# Patient Record
Sex: FEMALE | Race: WHITE | NOT HISPANIC OR LATINO
[De-identification: names, ages, dates, MRNs, and addresses within clinical notes are randomized per-mention and may not be internally consistent; named-entity substitution may affect disease eponyms.]

---

## 2018-01-31 ENCOUNTER — RESULT REVIEW (OUTPATIENT)
Age: 63
End: 2018-01-31

## 2018-05-04 ENCOUNTER — INPATIENT (INPATIENT)
Facility: HOSPITAL | Age: 63
LOS: 1 days | Discharge: HOME | DRG: 872 | End: 2018-05-06
Attending: STUDENT IN AN ORGANIZED HEALTH CARE EDUCATION/TRAINING PROGRAM | Admitting: STUDENT IN AN ORGANIZED HEALTH CARE EDUCATION/TRAINING PROGRAM
Payer: COMMERCIAL

## 2018-05-04 VITALS
SYSTOLIC BLOOD PRESSURE: 113 MMHG | TEMPERATURE: 99 F | DIASTOLIC BLOOD PRESSURE: 74 MMHG | WEIGHT: 136.03 LBS | HEART RATE: 104 BPM | OXYGEN SATURATION: 95 % | RESPIRATION RATE: 16 BRPM | HEIGHT: 63 IN

## 2018-05-04 DIAGNOSIS — N39.0 URINARY TRACT INFECTION, SITE NOT SPECIFIED: ICD-10-CM

## 2018-05-04 DIAGNOSIS — Z29.9 ENCOUNTER FOR PROPHYLACTIC MEASURES, UNSPECIFIED: ICD-10-CM

## 2018-05-04 DIAGNOSIS — J44.9 CHRONIC OBSTRUCTIVE PULMONARY DISEASE, UNSPECIFIED: ICD-10-CM

## 2018-05-04 DIAGNOSIS — A41.9 SEPSIS, UNSPECIFIED ORGANISM: ICD-10-CM

## 2018-05-04 DIAGNOSIS — N13.9 OBSTRUCTIVE AND REFLUX UROPATHY, UNSPECIFIED: ICD-10-CM

## 2018-05-04 DIAGNOSIS — Z98.89 OTHER SPECIFIED POSTPROCEDURAL STATES: Chronic | ICD-10-CM

## 2018-05-04 DIAGNOSIS — Z87.891 PERSONAL HISTORY OF NICOTINE DEPENDENCE: ICD-10-CM

## 2018-05-04 DIAGNOSIS — N10 ACUTE PYELONEPHRITIS: ICD-10-CM

## 2018-05-04 LAB
ALBUMIN SERPL ELPH-MCNC: 3.7 G/DL — SIGNIFICANT CHANGE UP (ref 3.3–5)
ALP SERPL-CCNC: 92 U/L — SIGNIFICANT CHANGE UP (ref 40–120)
ALT FLD-CCNC: 57 U/L — SIGNIFICANT CHANGE UP (ref 12–78)
ANION GAP SERPL CALC-SCNC: 7 MMOL/L — SIGNIFICANT CHANGE UP (ref 5–17)
ANION GAP SERPL CALC-SCNC: 7 MMOL/L — SIGNIFICANT CHANGE UP (ref 5–17)
APPEARANCE UR: CLEAR — SIGNIFICANT CHANGE UP
APTT BLD: 29.3 SEC — SIGNIFICANT CHANGE UP (ref 27.5–37.4)
AST SERPL-CCNC: 29 U/L — SIGNIFICANT CHANGE UP (ref 15–37)
BACTERIA # UR AUTO: ABNORMAL
BASOPHILS # BLD AUTO: 0.2 K/UL — SIGNIFICANT CHANGE UP (ref 0–0.2)
BASOPHILS NFR BLD AUTO: 1.3 % — SIGNIFICANT CHANGE UP (ref 0–2)
BILIRUB SERPL-MCNC: 0.9 MG/DL — SIGNIFICANT CHANGE UP (ref 0.2–1.2)
BILIRUB UR-MCNC: ABNORMAL
BUN SERPL-MCNC: 21 MG/DL — SIGNIFICANT CHANGE UP (ref 7–23)
BUN SERPL-MCNC: 21 MG/DL — SIGNIFICANT CHANGE UP (ref 7–23)
CALCIUM SERPL-MCNC: 9.2 MG/DL — SIGNIFICANT CHANGE UP (ref 8.5–10.1)
CALCIUM SERPL-MCNC: 9.2 MG/DL — SIGNIFICANT CHANGE UP (ref 8.5–10.1)
CHLORIDE SERPL-SCNC: 103 MMOL/L — SIGNIFICANT CHANGE UP (ref 96–108)
CHLORIDE SERPL-SCNC: 103 MMOL/L — SIGNIFICANT CHANGE UP (ref 96–108)
CO2 SERPL-SCNC: 29 MMOL/L — SIGNIFICANT CHANGE UP (ref 22–31)
CO2 SERPL-SCNC: 29 MMOL/L — SIGNIFICANT CHANGE UP (ref 22–31)
COLOR SPEC: YELLOW — SIGNIFICANT CHANGE UP
CREAT SERPL-MCNC: 0.78 MG/DL — SIGNIFICANT CHANGE UP (ref 0.5–1.3)
CREAT SERPL-MCNC: 0.78 MG/DL — SIGNIFICANT CHANGE UP (ref 0.5–1.3)
DIFF PNL FLD: ABNORMAL
EOSINOPHIL # BLD AUTO: 0 K/UL — SIGNIFICANT CHANGE UP (ref 0–0.5)
EOSINOPHIL NFR BLD AUTO: 0 % — SIGNIFICANT CHANGE UP (ref 0–6)
EPI CELLS # UR: SIGNIFICANT CHANGE UP
GLUCOSE SERPL-MCNC: 119 MG/DL — HIGH (ref 70–99)
GLUCOSE SERPL-MCNC: 119 MG/DL — HIGH (ref 70–99)
GLUCOSE UR QL: NEGATIVE — SIGNIFICANT CHANGE UP
HCT VFR BLD CALC: 45.2 % — HIGH (ref 34.5–45)
HGB BLD-MCNC: 15.6 G/DL — HIGH (ref 11.5–15.5)
HYALINE CASTS # UR AUTO: ABNORMAL /LPF
INR BLD: 1.12 RATIO — SIGNIFICANT CHANGE UP (ref 0.88–1.16)
KETONES UR-MCNC: ABNORMAL
LACTATE SERPL-SCNC: 1.1 MMOL/L — SIGNIFICANT CHANGE UP (ref 0.7–2)
LEUKOCYTE ESTERASE UR-ACNC: ABNORMAL
LYMPHOCYTES # BLD AUTO: 1 K/UL — SIGNIFICANT CHANGE UP (ref 1–3.3)
LYMPHOCYTES # BLD AUTO: 5.7 % — LOW (ref 13–44)
MCHC RBC-ENTMCNC: 30.2 PG — SIGNIFICANT CHANGE UP (ref 27–34)
MCHC RBC-ENTMCNC: 34.5 GM/DL — SIGNIFICANT CHANGE UP (ref 32–36)
MCV RBC AUTO: 87.5 FL — SIGNIFICANT CHANGE UP (ref 80–100)
MONOCYTES # BLD AUTO: 1.1 K/UL — HIGH (ref 0–0.9)
MONOCYTES NFR BLD AUTO: 6.3 % — SIGNIFICANT CHANGE UP (ref 1–9)
NEUTROPHILS # BLD AUTO: 14.8 K/UL — HIGH (ref 1.8–7.4)
NEUTROPHILS NFR BLD AUTO: 86.6 % — HIGH (ref 43–77)
NITRITE UR-MCNC: NEGATIVE — SIGNIFICANT CHANGE UP
PH UR: 5 — SIGNIFICANT CHANGE UP (ref 5–8)
PLATELET # BLD AUTO: 235 K/UL — SIGNIFICANT CHANGE UP (ref 150–400)
POTASSIUM SERPL-MCNC: 3.8 MMOL/L — SIGNIFICANT CHANGE UP (ref 3.5–5.3)
POTASSIUM SERPL-MCNC: 3.8 MMOL/L — SIGNIFICANT CHANGE UP (ref 3.5–5.3)
POTASSIUM SERPL-SCNC: 3.8 MMOL/L — SIGNIFICANT CHANGE UP (ref 3.5–5.3)
POTASSIUM SERPL-SCNC: 3.8 MMOL/L — SIGNIFICANT CHANGE UP (ref 3.5–5.3)
PROT SERPL-MCNC: 7.8 G/DL — SIGNIFICANT CHANGE UP (ref 6–8.3)
PROT UR-MCNC: NEGATIVE — SIGNIFICANT CHANGE UP
PROTHROM AB SERPL-ACNC: 12.2 SEC — SIGNIFICANT CHANGE UP (ref 9.8–12.7)
RBC # BLD: 5.17 M/UL — SIGNIFICANT CHANGE UP (ref 3.8–5.2)
RBC # FLD: 11.2 % — SIGNIFICANT CHANGE UP (ref 10.3–14.5)
RBC CASTS # UR COMP ASSIST: ABNORMAL /HPF (ref 0–4)
SODIUM SERPL-SCNC: 139 MMOL/L — SIGNIFICANT CHANGE UP (ref 135–145)
SODIUM SERPL-SCNC: 139 MMOL/L — SIGNIFICANT CHANGE UP (ref 135–145)
SP GR SPEC: 1.02 — SIGNIFICANT CHANGE UP (ref 1.01–1.02)
UROBILINOGEN FLD QL: 1
WBC # BLD: 17.1 K/UL — HIGH (ref 3.8–10.5)
WBC # FLD AUTO: 17.1 K/UL — HIGH (ref 3.8–10.5)
WBC UR QL: SIGNIFICANT CHANGE UP

## 2018-05-04 PROCEDURE — 74176 CT ABD & PELVIS W/O CONTRAST: CPT | Mod: 26

## 2018-05-04 PROCEDURE — 99223 1ST HOSP IP/OBS HIGH 75: CPT | Mod: AI

## 2018-05-04 PROCEDURE — 99285 EMERGENCY DEPT VISIT HI MDM: CPT

## 2018-05-04 RX ORDER — TAMSULOSIN HYDROCHLORIDE 0.4 MG/1
0.4 CAPSULE ORAL ONCE
Qty: 0 | Refills: 0 | Status: COMPLETED | OUTPATIENT
Start: 2018-05-04 | End: 2018-05-04

## 2018-05-04 RX ORDER — CEFTRIAXONE 500 MG/1
1 INJECTION, POWDER, FOR SOLUTION INTRAMUSCULAR; INTRAVENOUS EVERY 24 HOURS
Qty: 0 | Refills: 0 | Status: DISCONTINUED | OUTPATIENT
Start: 2018-05-05 | End: 2018-05-06

## 2018-05-04 RX ORDER — VALACYCLOVIR 500 MG/1
1 TABLET, FILM COATED ORAL
Qty: 0 | Refills: 0 | COMMUNITY

## 2018-05-04 RX ORDER — TRAMADOL HYDROCHLORIDE 50 MG/1
1 TABLET ORAL
Qty: 0 | Refills: 0 | COMMUNITY

## 2018-05-04 RX ORDER — CEFTRIAXONE 500 MG/1
INJECTION, POWDER, FOR SOLUTION INTRAMUSCULAR; INTRAVENOUS
Qty: 0 | Refills: 0 | Status: DISCONTINUED | OUTPATIENT
Start: 2018-05-04 | End: 2018-05-06

## 2018-05-04 RX ORDER — AZTREONAM 2 G
2000 VIAL (EA) INJECTION EVERY 8 HOURS
Qty: 0 | Refills: 0 | Status: DISCONTINUED | OUTPATIENT
Start: 2018-05-04 | End: 2018-05-04

## 2018-05-04 RX ORDER — AZTREONAM 2 G
1000 VIAL (EA) INJECTION ONCE
Qty: 0 | Refills: 0 | Status: COMPLETED | OUTPATIENT
Start: 2018-05-04 | End: 2018-05-04

## 2018-05-04 RX ORDER — ONDANSETRON 8 MG/1
4 TABLET, FILM COATED ORAL EVERY 4 HOURS
Qty: 0 | Refills: 0 | Status: DISCONTINUED | OUTPATIENT
Start: 2018-05-04 | End: 2018-05-06

## 2018-05-04 RX ORDER — CEFTRIAXONE 500 MG/1
1 INJECTION, POWDER, FOR SOLUTION INTRAMUSCULAR; INTRAVENOUS ONCE
Qty: 0 | Refills: 0 | Status: COMPLETED | OUTPATIENT
Start: 2018-05-04 | End: 2018-05-04

## 2018-05-04 RX ORDER — SODIUM CHLORIDE 9 MG/ML
1000 INJECTION INTRAMUSCULAR; INTRAVENOUS; SUBCUTANEOUS
Qty: 0 | Refills: 0 | Status: COMPLETED | OUTPATIENT
Start: 2018-05-04 | End: 2018-05-04

## 2018-05-04 RX ORDER — TAMSULOSIN HYDROCHLORIDE 0.4 MG/1
0.4 CAPSULE ORAL AT BEDTIME
Qty: 0 | Refills: 0 | Status: DISCONTINUED | OUTPATIENT
Start: 2018-05-04 | End: 2018-05-06

## 2018-05-04 RX ORDER — KETOROLAC TROMETHAMINE 30 MG/ML
15 SYRINGE (ML) INJECTION EVERY 8 HOURS
Qty: 0 | Refills: 0 | Status: DISCONTINUED | OUTPATIENT
Start: 2018-05-04 | End: 2018-05-06

## 2018-05-04 RX ORDER — ACETAMINOPHEN 500 MG
650 TABLET ORAL EVERY 6 HOURS
Qty: 0 | Refills: 0 | Status: DISCONTINUED | OUTPATIENT
Start: 2018-05-04 | End: 2018-05-06

## 2018-05-04 RX ORDER — MORPHINE SULFATE 50 MG/1
2 CAPSULE, EXTENDED RELEASE ORAL EVERY 4 HOURS
Qty: 0 | Refills: 0 | Status: DISCONTINUED | OUTPATIENT
Start: 2018-05-04 | End: 2018-05-06

## 2018-05-04 RX ORDER — SODIUM CHLORIDE 9 MG/ML
1000 INJECTION INTRAMUSCULAR; INTRAVENOUS; SUBCUTANEOUS
Qty: 0 | Refills: 0 | Status: DISCONTINUED | OUTPATIENT
Start: 2018-05-04 | End: 2018-05-06

## 2018-05-04 RX ORDER — FAMOTIDINE 10 MG/ML
20 INJECTION INTRAVENOUS
Qty: 0 | Refills: 0 | Status: DISCONTINUED | OUTPATIENT
Start: 2018-05-04 | End: 2018-05-06

## 2018-05-04 RX ORDER — MORPHINE SULFATE 50 MG/1
4 CAPSULE, EXTENDED RELEASE ORAL ONCE
Qty: 0 | Refills: 0 | Status: DISCONTINUED | OUTPATIENT
Start: 2018-05-04 | End: 2018-05-04

## 2018-05-04 RX ORDER — ONDANSETRON 8 MG/1
4 TABLET, FILM COATED ORAL ONCE
Qty: 0 | Refills: 0 | Status: COMPLETED | OUTPATIENT
Start: 2018-05-04 | End: 2018-05-04

## 2018-05-04 RX ADMIN — SODIUM CHLORIDE 125 MILLILITER(S): 9 INJECTION INTRAMUSCULAR; INTRAVENOUS; SUBCUTANEOUS at 21:33

## 2018-05-04 RX ADMIN — TAMSULOSIN HYDROCHLORIDE 0.4 MILLIGRAM(S): 0.4 CAPSULE ORAL at 16:29

## 2018-05-04 RX ADMIN — TAMSULOSIN HYDROCHLORIDE 0.4 MILLIGRAM(S): 0.4 CAPSULE ORAL at 21:34

## 2018-05-04 RX ADMIN — Medication 15 MILLIGRAM(S): at 19:42

## 2018-05-04 RX ADMIN — Medication 15 MILLIGRAM(S): at 20:08

## 2018-05-04 RX ADMIN — CEFTRIAXONE 100 GRAM(S): 500 INJECTION, POWDER, FOR SOLUTION INTRAMUSCULAR; INTRAVENOUS at 19:36

## 2018-05-04 RX ADMIN — MORPHINE SULFATE 4 MILLIGRAM(S): 50 CAPSULE, EXTENDED RELEASE ORAL at 15:03

## 2018-05-04 RX ADMIN — Medication 50 MILLIGRAM(S): at 16:27

## 2018-05-04 RX ADMIN — SODIUM CHLORIDE 1000 MILLILITER(S): 9 INJECTION INTRAMUSCULAR; INTRAVENOUS; SUBCUTANEOUS at 15:08

## 2018-05-04 RX ADMIN — SODIUM CHLORIDE 1000 MILLILITER(S): 9 INJECTION INTRAMUSCULAR; INTRAVENOUS; SUBCUTANEOUS at 19:16

## 2018-05-04 RX ADMIN — MORPHINE SULFATE 4 MILLIGRAM(S): 50 CAPSULE, EXTENDED RELEASE ORAL at 19:02

## 2018-05-04 RX ADMIN — SODIUM CHLORIDE 1000 MILLILITER(S): 9 INJECTION INTRAMUSCULAR; INTRAVENOUS; SUBCUTANEOUS at 16:21

## 2018-05-04 RX ADMIN — ONDANSETRON 4 MILLIGRAM(S): 8 TABLET, FILM COATED ORAL at 15:05

## 2018-05-04 NOTE — ED PROVIDER NOTE - OBJECTIVE STATEMENT
pt is a 64 yo f who has known hx of intrarenal stones, recently finished treatment for shingles copd former smoker sp gb and hernia pmd dr BOOGIE Cuevas allergic to pcn and latex  she began to have pain in her back 3 days ago saw pmd, who ordered xrays found djd so she was prescribed pain meds. she has persistent severe pain underwent us today and found to  have hydro on r side of kidney and the previously seen stone is now gone.    pt sent emergently to er for ct stonhunt an dpain control

## 2018-05-04 NOTE — ED PROVIDER NOTE - CARE PLAN
Principal Discharge DX:	Obstructive uropathy  Secondary Diagnosis:	Renal colic on right side  Secondary Diagnosis:	Pyelonephritis

## 2018-05-04 NOTE — ED PROVIDER NOTE - PROGRESS NOTE DETAILS
miguel pt who feels better ct labs need for admit   on call urology paged d  miguel daniels on call medicie for dr gonzales accpets to care

## 2018-05-04 NOTE — H&P ADULT - NSHPSOCIALHISTORY_GEN_ALL_CORE
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

## 2018-05-04 NOTE — H&P ADULT - HISTORY OF PRESENT ILLNESS
62yo F w/ PMHx borderline DM2, COPD (no meds) presents w/ R flank pain and found to have R sided pyelonephritis 62yo F w/ PMHx borderline DM2, COPD (no meds) presents w/ R flank pain. Pt has had pain in the R flank for the past few days. She thought symptoms were MSK as she has had back pain historically. She was seen by PMD who recommended trial of antiinflammatories. Symptoms worsened yesterday and pt described severe 9/10 pain in R flank, nonradiating, no h/o similar complaint. No gross blood in urine. No h/o nephrolithiasis. Today, she felt nauseated, no vomiting. Subjective fever and today experienced chills. The pt states that she felt "off" and not quite herself so she presented to the ED w/ these symptoms. No other complaints. Pt was treated w/ valtrex for shingles last week, she has a lesion on her cheeck which is dry and scabbed over. No pain associated w/ lesions. No blurred vision or eye pain.     In the ED, she was found to have 4mm obstructing mid ureteral stone on R side w/ moderate hydronephrosis. Zofran, morphine provided good relief of symptoms. Urology was consulted. She was given azactam x1 dose.

## 2018-05-04 NOTE — H&P ADULT - NSHPPHYSICALEXAM_GEN_ALL_CORE
T(C): 36.9 (05-04-18 @ 21:16), Max: 37.2 (05-04-18 @ 20:01)  HR: 87 (05-04-18 @ 21:16) (87 - 104)  BP: 110/76 (05-04-18 @ 21:16) (110/76 - 113/74)  RR: 17 (05-04-18 @ 21:16) (16 - 18)  SpO2: 95% (05-04-18 @ 21:16) (86% - 95%)    GENERAL: patient appears well, no acute distress, appropriate, pleasant, nontoxic appearing  EYES: sclera clear, no exudates  ENMT: oropharynx clear without erythema, no exudates, moist mucous membranes  NECK: supple, soft, no thyromegaly noted  LUNGS: good air entry bilaterally, clear to auscultation, symmetric breath sounds, no wheezing or rhonchi appreciated  HEART: soft S1/S2, regular rate and rhythm, no murmurs noted, no lower extremity edema  GASTROINTESTINAL: abdomen is soft, nontender, nondistended, normoactive bowel sounds, no palpable masses. R flank pain w/ CVA tenderness  INTEGUMENT: good skin turgor, no lesions noted. scab on R cheek inferolateral to her orbit  MUSCULOSKELETAL: no clubbing or cyanosis, no obvious deformity  NEUROLOGIC: awake, alert, oriented x3, good muscle tone in 4 extremities, no obvious sensory deficits  PSYCHIATRIC: mood is good, affect is congruent, linear and logical thought process  HEME/LYMPH: no palpable supraclavicular nodules, no obvious ecchymosis or petechiae

## 2018-05-04 NOTE — H&P ADULT - NSHPREVIEWOFSYSTEMS_GEN_ALL_CORE
CONSTITUTIONAL: denies fever, chills, fatigue, weakness  HEENT: denies blurred vision, sore throat  SKIN: denies new lesions, rash  CARDIOVASCULAR: denies chest pain, chest pressure, palpitations  RESPIRATORY: denies shortness of breath, sputum production  GASTROINTESTINAL: denies vomiting, diarrhea, abdominal pain  GENITOURINARY: as per HPI. admits malodorous urine.   NEUROLOGICAL: denies numbness, headache, focal weakness  MUSCULOSKELETAL: as per HPI  HEMATOLOGIC: denies gross bleeding, bruising  LYMPHATICS: denies enlarged lymph nodes, extremity swelling  PSYCHIATRIC: denies recent changes in anxiety, depression  ENDOCRINOLOGIC: denies sweating, cold or heat intolerance

## 2018-05-04 NOTE — H&P ADULT - PROBLEM SELECTOR PLAN 1
pyelonephritis complicated by obstructing 4mm calculus. Improved w/ pain control offered in the ED. Continue abx, IVF, may need cysto. Urology will consult  lactate is wnl  afebrile  nontoxic appearings pyelonephritis complicated by obstructing 4mm calculus. Improved w/ pain control offered in the ED. Continue abx, IVF, may need cysto. Urology will consult  lactate is wnl  afebrile  nontoxic appearings  flomax

## 2018-05-04 NOTE — CONSULT NOTE ADULT - SUBJECTIVE AND OBJECTIVE BOX
Patient is a 63y old  Female who presents with a chief complaint of Abdominal Pain (04 May 2018 20:07)      HISTORY OF PRESENT ILLNESS:  64 y/o female w/o significant PMH other than emphysema from smoking, presents to ER with Right flank pain of 3 days duration.  No fevers/vomiting.  CT findings as below (4mm right mid ureteral stone).  Pain appears to be well controlled.  Pt states that the location of the pain has moved the RLQ, anteriorly.    PAST MEDICAL & SURGICAL HISTORY:  Shingles  Emphysema lung    History of laparoscopic cholecystectomy:   Ventral hernia repair      REVIEW OF SYSTEMS:    CONSTITUTIONAL: No weakness, fevers or chills  SKIN: No itching, burning, rashes, or lesions   EYES/ENT: No visual changes;  No vertigo or throat pain   NECK: No pain or stiffness  RESPIRATORY: No cough, wheezing, hemoptysis; No shortness of breath  CARDIOVASCULAR: No chest pain or palpitations  GASTROINTESTINAL: No abdominal or epigastric pain. No nausea, vomiting, diarrhea  NEUROLOGICAL: No numbness or weakness  GENITOURINARY:     No hematuria, dysuria, incontinence    All other review of systems is negative unless indicated above.    MEDICATIONS  (STANDING):  cefTRIAXone   IVPB      famotidine    Tablet 20 milliGRAM(s) Oral two times a day  sodium chloride 0.9%. 1000 milliLiter(s) (125 mL/Hr) IV Continuous <Continuous>    MEDICATIONS  (PRN):  acetaminophen   Tablet. 650 milliGRAM(s) Oral every 6 hours PRN Mild Pain (1 - 3)  ketorolac   Injectable 15 milliGRAM(s) IV Push every 8 hours PRN Moderate Pain (4 - 6)  morphine  - Injectable 2 milliGRAM(s) IV Push every 4 hours PRN Severe Pain (7 - 10)  ondansetron Injectable 4 milliGRAM(s) IV Push every 4 hours PRN Nausea and/or Vomiting      Allergies    latex (Rash)  penicillin (Rash)    SOCIAL HISTORY:   Smoking: quit several yrs ago after smoking 2 ppd x 30 yrs   EtOH: occasional   Work:   for her 's construction business      FAMILY HISTORY:  Family history of heart attack      Vital Signs Last 24 Hrs  T(C): 37.2 (04 May 2018 20:01), Max: 37.2 (04 May 2018 20:01)  T(F): 98.9 (04 May 2018 20:01), Max: 98.9 (04 May 2018 20:01)  HR: 102 (04 May 2018 20:01) (102 - 104)  BP: 112/75 (04 May 2018 20:01) (112/75 - 113/74)  BP(mean): --  RR: 18 (04 May 2018 20:01) (16 - 18)  SpO2: 86% (04 May 2018 20:01) (86% - 95%)    PHYSICAL EXAM:    Constitutional: NAD, well-developed, pleasant  HEENT: PERRLA, EOMI  Neck: Supple, full ROM  Back: No CVA tenderness  Respiratory: Normal repiratory effort  Cardiovascular: RRR  Abd: Soft, NT/ND  Extremities: No peripheral edema  Neurological: A&O x 3, no focal deficits  Psychiatric: Normal mood, normal affect  Musculoskeletal: no clubbing/cyanosis/edema  Skin: No rashes    LABS:                        15.6   17.1  )-----------( 235      ( 04 May 2018 15:13 )             45.2     05-    139  |  103  |  21  ----------------------------<  119<H>  3.8   |  29  |  0.78    Ca    9.2      04 May 2018 15:13    TPro  7.8  /  Alb  3.7  /  TBili  0.9  /  DBili  x   /  AST  29  /  ALT  57  /  AlkPhos  92  05-04    PT/INR - ( 04 May 2018 15:13 )   PT: 12.2 sec;   INR: 1.12 ratio         PTT - ( 04 May 2018 15:13 )  PTT:29.3 sec  Urinalysis Basic - ( 04 May 2018 15:12 )    Color: Yellow / Appearance: Clear / S.025 / pH: x  Gluc: x / Ketone: Large  / Bili: Small / Urobili: 1   Blood: x / Protein: Negative / Nitrite: Negative   Leuk Esterase: Trace / RBC: 6-10 /HPF / WBC 0-2   Sq Epi: x / Non Sq Epi: Occasional / Bacteria: Few    Lactate=1.1      RADIOLOGY & ADDITIONAL STUDIES:    EXAM:  CT RENAL STONE HUNT                            PROCEDURE DATE:  2018          INTERPRETATION:  History: Right flank pain.    CT abdomen and pelvis, no oral or IV contrast.    Emphysematous changes and fibrotic atelectatic changes at the visualized   lung bases.  Status post cholecystectomy.  Unenhanced liver pancreas spleen not remarkable.  There is  moderate obstructive right hydronephrosis and hydroureter   secondary to a 4 mm calculus in the right midureter. Extensive   ipsilateral perirenal and periureteral stranding. Correlate clinically   for concomitant infection. There is a punctate nonobstructive calculus   left kidney.  No suspicious adenopathy.  Atherosclerotic nonaneurysmal abdominal aorta.  Normal appendix. Colonic diverticula, no diverticulitis. No bowel   obstruction or inflammation. No extraluminal fluid or gas.  Uterus atrophic or surgically absent. Bladder not remarkable.  No acute skeletal abnormality. Sacral meningocele.    Impression:    Moderate obstructive right hydronephrosis secondary to a 4 mm mid right   ureteral calculus.  Additional nonobstructive left nephrolithiasis.  Diverticulosis coli without diverticulitis.

## 2018-05-04 NOTE — ED ADULT NURSE NOTE - OBJECTIVE STATEMENT
pt to er c/o back pain slight nausea upon arrival is alert oriented speech clear lungs clear resp even unlabored skin intact iv started 20 angio right arm

## 2018-05-04 NOTE — H&P ADULT - ASSESSMENT
1- pyelonephritis complicated by obstructing 4mm calculus. Improved w/ pain control offered in the ED. Continue abx, IVF, may need cysto. Urology will consult 64yo F w/ PMHx borderline DM2, COPD (no meds) presents w/ R flank pain.

## 2018-05-04 NOTE — ED PROVIDER NOTE - CONSTITUTIONAL, MLM
normal... Well appearing, well nourished, awake, alert, oriented to person, place, time/situation and uncomfortable nauseated

## 2018-05-04 NOTE — CONSULT NOTE ADULT - ASSESSMENT
Renal colic from a 4mm right mid ureteral stone.  Pain appears to be well controlled.  Pt does not appear toxic.    Would not place ureteral stent in this patient unless WBC worsens and other signs of sepsis are present.  Continue Medical Expulsive Therapy.  Will follow.

## 2018-05-05 ENCOUNTER — TRANSCRIPTION ENCOUNTER (OUTPATIENT)
Age: 63
End: 2018-05-05

## 2018-05-05 LAB
ANION GAP SERPL CALC-SCNC: 6 MMOL/L — SIGNIFICANT CHANGE UP (ref 5–17)
BASOPHILS # BLD AUTO: 0.1 K/UL — SIGNIFICANT CHANGE UP (ref 0–0.2)
BASOPHILS NFR BLD AUTO: 0.6 % — SIGNIFICANT CHANGE UP (ref 0–2)
BUN SERPL-MCNC: 13 MG/DL — SIGNIFICANT CHANGE UP (ref 7–23)
CALCIUM SERPL-MCNC: 7.9 MG/DL — LOW (ref 8.5–10.1)
CHLORIDE SERPL-SCNC: 114 MMOL/L — HIGH (ref 96–108)
CO2 SERPL-SCNC: 26 MMOL/L — SIGNIFICANT CHANGE UP (ref 22–31)
CREAT SERPL-MCNC: 0.38 MG/DL — LOW (ref 0.5–1.3)
CULTURE RESULTS: SIGNIFICANT CHANGE UP
EOSINOPHIL # BLD AUTO: 0 K/UL — SIGNIFICANT CHANGE UP (ref 0–0.5)
EOSINOPHIL NFR BLD AUTO: 0.4 % — SIGNIFICANT CHANGE UP (ref 0–6)
GLUCOSE SERPL-MCNC: 93 MG/DL — SIGNIFICANT CHANGE UP (ref 70–99)
HCT VFR BLD CALC: 34.5 % — SIGNIFICANT CHANGE UP (ref 34.5–45)
HGB BLD-MCNC: 11.7 G/DL — SIGNIFICANT CHANGE UP (ref 11.5–15.5)
LYMPHOCYTES # BLD AUTO: 1.7 K/UL — SIGNIFICANT CHANGE UP (ref 1–3.3)
LYMPHOCYTES # BLD AUTO: 18.7 % — SIGNIFICANT CHANGE UP (ref 13–44)
MCHC RBC-ENTMCNC: 30.1 PG — SIGNIFICANT CHANGE UP (ref 27–34)
MCHC RBC-ENTMCNC: 33.9 GM/DL — SIGNIFICANT CHANGE UP (ref 32–36)
MCV RBC AUTO: 88.8 FL — SIGNIFICANT CHANGE UP (ref 80–100)
MONOCYTES # BLD AUTO: 0.7 K/UL — SIGNIFICANT CHANGE UP (ref 0–0.9)
MONOCYTES NFR BLD AUTO: 8.3 % — SIGNIFICANT CHANGE UP (ref 1–9)
NEUTROPHILS # BLD AUTO: 6.4 K/UL — SIGNIFICANT CHANGE UP (ref 1.8–7.4)
NEUTROPHILS NFR BLD AUTO: 72 % — SIGNIFICANT CHANGE UP (ref 43–77)
PLATELET # BLD AUTO: 144 K/UL — LOW (ref 150–400)
POTASSIUM SERPL-MCNC: 3.8 MMOL/L — SIGNIFICANT CHANGE UP (ref 3.5–5.3)
POTASSIUM SERPL-SCNC: 3.8 MMOL/L — SIGNIFICANT CHANGE UP (ref 3.5–5.3)
RBC # BLD: 3.88 M/UL — SIGNIFICANT CHANGE UP (ref 3.8–5.2)
RBC # FLD: 12 % — SIGNIFICANT CHANGE UP (ref 10.3–14.5)
SODIUM SERPL-SCNC: 146 MMOL/L — HIGH (ref 135–145)
SPECIMEN SOURCE: SIGNIFICANT CHANGE UP
WBC # BLD: 8.9 K/UL — SIGNIFICANT CHANGE UP (ref 3.8–10.5)
WBC # FLD AUTO: 8.9 K/UL — SIGNIFICANT CHANGE UP (ref 3.8–10.5)

## 2018-05-05 PROCEDURE — 99233 SBSQ HOSP IP/OBS HIGH 50: CPT

## 2018-05-05 RX ORDER — DIPHENHYDRAMINE HYDROCHLORIDE AND LIDOCAINE HYDROCHLORIDE AND ALUMINUM HYDROXIDE AND MAGNESIUM HYDRO
15 KIT DAILY
Qty: 0 | Refills: 0 | Status: DISCONTINUED | OUTPATIENT
Start: 2018-05-05 | End: 2018-05-06

## 2018-05-05 RX ORDER — ACETAMINOPHEN 500 MG
650 TABLET ORAL EVERY 6 HOURS
Qty: 0 | Refills: 0 | Status: DISCONTINUED | OUTPATIENT
Start: 2018-05-05 | End: 2018-05-06

## 2018-05-05 RX ORDER — CALCIUM CARBONATE 500(1250)
1 TABLET ORAL ONCE
Qty: 0 | Refills: 0 | Status: COMPLETED | OUTPATIENT
Start: 2018-05-05 | End: 2018-05-05

## 2018-05-05 RX ADMIN — DIPHENHYDRAMINE HYDROCHLORIDE AND LIDOCAINE HYDROCHLORIDE AND ALUMINUM HYDROXIDE AND MAGNESIUM HYDRO 15 MILLILITER(S): KIT at 14:12

## 2018-05-05 RX ADMIN — FAMOTIDINE 20 MILLIGRAM(S): 10 INJECTION INTRAVENOUS at 17:04

## 2018-05-05 RX ADMIN — Medication 1 TABLET(S): at 22:36

## 2018-05-05 RX ADMIN — CEFTRIAXONE 100 GRAM(S): 500 INJECTION, POWDER, FOR SOLUTION INTRAMUSCULAR; INTRAVENOUS at 18:47

## 2018-05-05 RX ADMIN — FAMOTIDINE 20 MILLIGRAM(S): 10 INJECTION INTRAVENOUS at 06:24

## 2018-05-05 RX ADMIN — Medication 650 MILLIGRAM(S): at 17:04

## 2018-05-05 RX ADMIN — TAMSULOSIN HYDROCHLORIDE 0.4 MILLIGRAM(S): 0.4 CAPSULE ORAL at 21:25

## 2018-05-05 RX ADMIN — Medication 650 MILLIGRAM(S): at 18:46

## 2018-05-05 NOTE — PROGRESS NOTE ADULT - PROBLEM SELECTOR PLAN 1
pyelonephritis complicated by obstructing 4mm calculus  Continue IV antibiotics pending culture and sensitivities  Urology consult recs appreciated   Continue Flomax

## 2018-05-05 NOTE — PROGRESS NOTE ADULT - SUBJECTIVE AND OBJECTIVE BOX
INTERVAL Hx:  No acute events overnight.  Last dose of pain meds was last night.  Pt denies having pain.  Afebrile.  WBC now normal.    MEDICATIONS  (STANDING):  cefTRIAXone   IVPB      cefTRIAXone   IVPB 1 Gram(s) IV Intermittent every 24 hours  famotidine    Tablet 20 milliGRAM(s) Oral two times a day  FIRST- Mouthwash  BLM 15 milliLiter(s) Swish and Spit daily  sodium chloride 0.9%. 1000 milliLiter(s) (125 mL/Hr) IV Continuous <Continuous>  tamsulosin 0.4 milliGRAM(s) Oral at bedtime    MEDICATIONS  (PRN):  acetaminophen   Tablet. 650 milliGRAM(s) Oral every 6 hours PRN Mild Pain (1 - 3)  ketorolac   Injectable 15 milliGRAM(s) IV Push every 8 hours PRN Moderate Pain (4 - 6)  morphine  - Injectable 2 milliGRAM(s) IV Push every 4 hours PRN Severe Pain (7 - 10)  ondansetron Injectable 4 milliGRAM(s) IV Push every 4 hours PRN Nausea and/or Vomiting        Vital Signs Last 24 Hrs  T(C): 36.6 (05 May 2018 04:56), Max: 37.2 (04 May 2018 20:01)  T(F): 97.8 (05 May 2018 04:56), Max: 98.9 (04 May 2018 20:01)  HR: 67 (05 May 2018 04:56) (67 - 104)  BP: 93/61 (05 May 2018 04:56) (93/61 - 113/74)  BP(mean): --  RR: 16 (05 May 2018 04:56) (16 - 18)  SpO2: 90% (05 May 2018 04:56) (86% - 95%)    PHYSICAL EXAM:    ABDOMEN: soft, NT, no CVAT    Izaguirre: no    LABS:                        11.7   8.9   )-----------( 144      ( 05 May 2018 08:24 )             34.5     05-05    146<H>  |  114<H>  |  13  ----------------------------<  93  3.8   |  26  |  0.38<L>    Ca    7.9<L>      05 May 2018 08:24    TPro  7.8  /  Alb  3.7  /  TBili  0.9  /  DBili  x   /  AST  29  /  ALT  57  /  AlkPhos  92  05-04        Review of Systems:    CONSTITUTIONAL: No weakness, fevers or chills    SKIN: No itching, burning, rashes, or lesions     EYES/ENT: No visual changes;  No vertigo or throat pain     NECK: No pain or stiffness    RESPIRATORY: No cough, wheezing, hemoptysis; No shortness of breath    CARDIOVASCULAR: No chest pain or palpitations    GASTROINTESTINAL: No abdominal or epigastric pain. No nausea, vomiting, diarrhea    NEUROLOGICAL: No numbness or weakness

## 2018-05-05 NOTE — PROGRESS NOTE ADULT - SUBJECTIVE AND OBJECTIVE BOX
INTERVAL HPI/OVERNIGHT EVENTS: Patient seen and examined at bedside. No acute events overnight. Awaiting culture sensitivity results.     MEDICATIONS  (STANDING):  cefTRIAXone   IVPB      cefTRIAXone   IVPB 1 Gram(s) IV Intermittent every 24 hours  famotidine    Tablet 20 milliGRAM(s) Oral two times a day  FIRST- Mouthwash  BLM 15 milliLiter(s) Swish and Spit daily  sodium chloride 0.9%. 1000 milliLiter(s) (125 mL/Hr) IV Continuous <Continuous>  tamsulosin 0.4 milliGRAM(s) Oral at bedtime    MEDICATIONS  (PRN):  acetaminophen   Tablet. 650 milliGRAM(s) Oral every 6 hours PRN Mild Pain (1 - 3)  ketorolac   Injectable 15 milliGRAM(s) IV Push every 8 hours PRN Moderate Pain (4 - 6)  morphine  - Injectable 2 milliGRAM(s) IV Push every 4 hours PRN Severe Pain (7 - 10)  ondansetron Injectable 4 milliGRAM(s) IV Push every 4 hours PRN Nausea and/or Vomiting      Allergies    latex (Rash)  penicillin (Rash)    Intolerances        CONSTITUTIONAL: No weakness, fevers or chills  RESPIRATORY: No cough, wheezing, hemoptysis; No shortness of breath  Cvs: No chest pain or palpitations  Gi: No abdominal pain. No nausea, vomiting, diarrhea or constipation. No melena or hematochezia.  Neuro: no weakness    All other review of systems is negative unless indicated above.    Vital Signs Last 24 Hrs  T(C): 37.1 (05 May 2018 14:15), Max: 37.2 (04 May 2018 20:01)  T(F): 98.8 (05 May 2018 14:15), Max: 98.9 (04 May 2018 20:01)  HR: 111 (05 May 2018 14:15) (67 - 111)  BP: 97/67 (05 May 2018 14:15) (93/61 - 112/75)  BP(mean): --  RR: 16 (05 May 2018 14:15) (16 - 18)  SpO2: 87% (05 May 2018 14:15) (86% - 95%)    General: NAD  Neurology: A&Ox3 , nonfocal  Respiratory: CTA B/L  CV: RRR, S1S2  Abdominal: Soft, NT, ND +BS  Extremities: No edema, + peripheral pulses      LABS:                        11.7   8.9   )-----------( 144      ( 05 May 2018 08:24 )             34.5         146<H>  |  114<H>  |  13  ----------------------------<  93  3.8   |  26  |  0.38<L>    Ca    7.9<L>      05 May 2018 08:24    TPro  7.8  /  Alb  3.7  /  TBili  0.9  /  DBili  x   /  AST  29  /  ALT  57  /  AlkPhos  92  05-04    PT/INR - ( 04 May 2018 15:13 )   PT: 12.2 sec;   INR: 1.12 ratio         PTT - ( 04 May 2018 15:13 )  PTT:29.3 sec  Urinalysis Basic - ( 04 May 2018 15:12 )    Color: Yellow / Appearance: Clear / S.025 / pH: x  Gluc: x / Ketone: Large  / Bili: Small / Urobili: 1   Blood: x / Protein: Negative / Nitrite: Negative   Leuk Esterase: Trace / RBC: 6-10 /HPF / WBC 0-2   Sq Epi: x / Non Sq Epi: Occasional / Bacteria: Few        RADIOLOGY & ADDITIONAL TESTS:

## 2018-05-05 NOTE — PROGRESS NOTE ADULT - ASSESSMENT
62yo F w/ PMHx borderline DM2, COPD (no meds) presents w/ R flank pain found to have pyelonephritis, obstructing uropathy.

## 2018-05-05 NOTE — PROGRESS NOTE ADULT - ASSESSMENT
Resolved renal colic from 4mm right mid ureteral stone.  OK to d/c home with Percocet, Cefuroxime and Flomax.  Outpatient f/u with urologist.

## 2018-05-05 NOTE — PROGRESS NOTE ADULT - PROBLEM SELECTOR PLAN 2
Continue ceftriaxone   f/u urine culture sensitivities   monitor for allergic rxn (10% cross reactivity with pcn)

## 2018-05-06 ENCOUNTER — TRANSCRIPTION ENCOUNTER (OUTPATIENT)
Age: 63
End: 2018-05-06

## 2018-05-06 VITALS
HEART RATE: 92 BPM | TEMPERATURE: 100 F | DIASTOLIC BLOOD PRESSURE: 82 MMHG | OXYGEN SATURATION: 90 % | RESPIRATION RATE: 16 BRPM | SYSTOLIC BLOOD PRESSURE: 133 MMHG

## 2018-05-06 LAB
ANION GAP SERPL CALC-SCNC: 6 MMOL/L — SIGNIFICANT CHANGE UP (ref 5–17)
BUN SERPL-MCNC: 10 MG/DL — SIGNIFICANT CHANGE UP (ref 7–23)
CALCIUM SERPL-MCNC: 8.8 MG/DL — SIGNIFICANT CHANGE UP (ref 8.5–10.1)
CHLORIDE SERPL-SCNC: 110 MMOL/L — HIGH (ref 96–108)
CO2 SERPL-SCNC: 30 MMOL/L — SIGNIFICANT CHANGE UP (ref 22–31)
CREAT SERPL-MCNC: 0.48 MG/DL — LOW (ref 0.5–1.3)
GLUCOSE SERPL-MCNC: 98 MG/DL — SIGNIFICANT CHANGE UP (ref 70–99)
HCT VFR BLD CALC: 39.1 % — SIGNIFICANT CHANGE UP (ref 34.5–45)
HGB BLD-MCNC: 13.5 G/DL — SIGNIFICANT CHANGE UP (ref 11.5–15.5)
MCHC RBC-ENTMCNC: 30.8 PG — SIGNIFICANT CHANGE UP (ref 27–34)
MCHC RBC-ENTMCNC: 34.5 GM/DL — SIGNIFICANT CHANGE UP (ref 32–36)
MCV RBC AUTO: 89.3 FL — SIGNIFICANT CHANGE UP (ref 80–100)
PLATELET # BLD AUTO: 188 K/UL — SIGNIFICANT CHANGE UP (ref 150–400)
POTASSIUM SERPL-MCNC: 3.6 MMOL/L — SIGNIFICANT CHANGE UP (ref 3.5–5.3)
POTASSIUM SERPL-SCNC: 3.6 MMOL/L — SIGNIFICANT CHANGE UP (ref 3.5–5.3)
RBC # BLD: 4.38 M/UL — SIGNIFICANT CHANGE UP (ref 3.8–5.2)
RBC # FLD: 12.2 % — SIGNIFICANT CHANGE UP (ref 10.3–14.5)
SODIUM SERPL-SCNC: 146 MMOL/L — HIGH (ref 135–145)
WBC # BLD: 6.8 K/UL — SIGNIFICANT CHANGE UP (ref 3.8–10.5)
WBC # FLD AUTO: 6.8 K/UL — SIGNIFICANT CHANGE UP (ref 3.8–10.5)

## 2018-05-06 PROCEDURE — 99285 EMERGENCY DEPT VISIT HI MDM: CPT | Mod: 25

## 2018-05-06 PROCEDURE — 80048 BASIC METABOLIC PNL TOTAL CA: CPT

## 2018-05-06 PROCEDURE — 81001 URINALYSIS AUTO W/SCOPE: CPT

## 2018-05-06 PROCEDURE — 87086 URINE CULTURE/COLONY COUNT: CPT

## 2018-05-06 PROCEDURE — 80053 COMPREHEN METABOLIC PANEL: CPT

## 2018-05-06 PROCEDURE — 74176 CT ABD & PELVIS W/O CONTRAST: CPT

## 2018-05-06 PROCEDURE — 85730 THROMBOPLASTIN TIME PARTIAL: CPT

## 2018-05-06 PROCEDURE — 83605 ASSAY OF LACTIC ACID: CPT

## 2018-05-06 PROCEDURE — 85610 PROTHROMBIN TIME: CPT

## 2018-05-06 PROCEDURE — 96375 TX/PRO/DX INJ NEW DRUG ADDON: CPT

## 2018-05-06 PROCEDURE — 36415 COLL VENOUS BLD VENIPUNCTURE: CPT

## 2018-05-06 PROCEDURE — 96365 THER/PROPH/DIAG IV INF INIT: CPT

## 2018-05-06 PROCEDURE — 85027 COMPLETE CBC AUTOMATED: CPT

## 2018-05-06 PROCEDURE — 99239 HOSP IP/OBS DSCHRG MGMT >30: CPT

## 2018-05-06 PROCEDURE — 87040 BLOOD CULTURE FOR BACTERIA: CPT

## 2018-05-06 RX ORDER — CEFPODOXIME PROXETIL 100 MG
1 TABLET ORAL
Qty: 16 | Refills: 0 | OUTPATIENT
Start: 2018-05-06 | End: 2018-05-13

## 2018-05-06 RX ORDER — TAMSULOSIN HYDROCHLORIDE 0.4 MG/1
1 CAPSULE ORAL
Qty: 30 | Refills: 0
Start: 2018-05-06 | End: 2018-06-04

## 2018-05-06 RX ORDER — FAMOTIDINE 10 MG/ML
1 INJECTION INTRAVENOUS
Qty: 60 | Refills: 0 | OUTPATIENT
Start: 2018-05-06 | End: 2018-06-04

## 2018-05-06 RX ORDER — ACETAMINOPHEN 500 MG
2 TABLET ORAL
Qty: 0 | Refills: 0 | COMMUNITY
Start: 2018-05-06

## 2018-05-06 RX ORDER — LACTOBACILLUS ACIDOPHILUS 100MM CELL
1 CAPSULE ORAL
Qty: 28 | Refills: 0 | OUTPATIENT
Start: 2018-05-06 | End: 2018-05-19

## 2018-05-06 RX ADMIN — DIPHENHYDRAMINE HYDROCHLORIDE AND LIDOCAINE HYDROCHLORIDE AND ALUMINUM HYDROXIDE AND MAGNESIUM HYDRO 15 MILLILITER(S): KIT at 13:08

## 2018-05-06 RX ADMIN — FAMOTIDINE 20 MILLIGRAM(S): 10 INJECTION INTRAVENOUS at 05:45

## 2018-05-06 NOTE — DISCHARGE NOTE ADULT - CARE PLAN
Principal Discharge DX:	Obstructive uropathy  Goal:	stable  Assessment and plan of treatment:	Follow up with urologist 1-2 weeks after discharge  continue flomax  Secondary Diagnosis:	Pyelonephritis  Assessment and plan of treatment:	continue antibiotics  Secondary Diagnosis:	Emphysema lung  Assessment and plan of treatment:	see your primary care doctor as scheduled

## 2018-05-06 NOTE — DISCHARGE NOTE ADULT - CARE PROVIDERS DIRECT ADDRESSES
,DirectAddress_Unknown,[email or web address],DirectAddress_Unknown

## 2018-05-06 NOTE — DISCHARGE NOTE ADULT - PLAN OF CARE
stable Follow up with urologist 1-2 weeks after discharge  continue flomax continue antibiotics see your primary care doctor as scheduled

## 2018-05-06 NOTE — DISCHARGE NOTE ADULT - HOSPITAL COURSE
64yo F w/ PMHx borderline DM2, COPD (no meds) presents w/ R flank pain. Pt has had pain in the R flank for the past few days. She thought symptoms were MSK as she has had back pain historically. She was seen by PMD who recommended trial of antiinflammatories. Symptoms worsened yesterday and pt described severe 9/10 pain in R flank, nonradiating, no h/o similar complaint. No gross blood in urine. No h/o nephrolithiasis. Today, she felt nauseated, no vomiting. Subjective fever and today experienced chills. The pt states that she felt "off" and not quite herself so she presented to the ED w/ these symptoms. No other complaints. Pt was treated w/ valtrex for shingles last week, she has a lesion on her cheeck which is dry and scabbed over. No pain associated w/ lesions. No blurred vision or eye pain.     In the ED, she was found to have 4mm obstructing mid ureteral stone on R side w/ moderate hydronephrosis. Zofran, morphine provided good relief of symptoms. Urology was consulted. She was given azactam x1 dose.     Patient was admitted for  pyelonephritis, obstructing 4mm calculus. Continue IV antibiotics ceftriaxone, switched to oral antibiotics.   Seen by Urologist, recommend no intervention at this time. Patient remains hemodynamically stable for discharge to home with close outpatient urology follow up.     Vital Signs Last 24 Hrs  T(C): 36.7 (06 May 2018 04:19), Max: 37.1 (05 May 2018 14:15)  T(F): 98.1 (06 May 2018 04:19), Max: 98.8 (05 May 2018 14:15)  HR: 93 (06 May 2018 04:19) (88 - 111)  BP: 120/80 (06 May 2018 04:19) (97/67 - 120/80)  BP(mean): --  RR: 16 (06 May 2018 04:19) (16 - 16)  SpO2: 95% (06 May 2018 04:19) (87% - 95%)    General: WN/WD NAD  Neurology: A&Ox3, nonfocal, HUSAIN x 4  Respiratory: CTA B/L  CV: RRR, S1S2, no murmurs, rubs or gallops  Abdominal: Soft, NT, ND +BS, Last BM  Extremities: No edema, + peripheral pulses

## 2018-05-06 NOTE — DISCHARGE NOTE ADULT - MEDICATION SUMMARY - MEDICATIONS TO TAKE
I will START or STAY ON the medications listed below when I get home from the hospital:    acetaminophen 325 mg oral tablet  -- 2 tab(s) by mouth every 6 hours, As needed, Mild Pain (1 - 3)  -- Indication: For Pain control    acetaminophen 325 mg oral tablet  -- 2 tab(s) by mouth every 6 hours, As needed, Moderate Pain (4 - 6)  -- Indication: For Pain control    oxycodone-acetaminophen 10 mg-300 mg oral tablet  -- 1 tab(s) by mouth every 6 hours, As Needed -for severe pain MDD:4 tabs  -- Caution federal law prohibits the transfer of this drug to any person other  than the person for whom it was prescribed.  May cause drowsiness.  Alcohol may intensify this effect.  Use care when operating dangerous machinery.  This drug may impair the ability to drive or operate machinery.  Use care until you become familiar with its effects.  This prescription cannot be refilled.  This product contains acetaminophen.  Do not use  with any other product containing acetaminophen to prevent possible liver damage.  Using more of this medication than prescribed may cause serious breathing problems.    -- Indication: For Pain control    tamsulosin 0.4 mg oral capsule  -- 1 cap(s) by mouth once a day (at bedtime)  -- Indication: For Obstructive and reflux uropathy    cefpodoxime 200 mg oral tablet  -- 1 tab(s) by mouth every 12 hours   -- Finish all this medication unless otherwise directed by prescriber.  Take with food or milk.    -- Indication: For Pyelonephritis    famotidine 20 mg oral tablet  -- 1 tab(s) by mouth 2 times a day  -- Indication: For Reflux    lactobacillus acidophilus oral capsule  -- 1 cap(s) by mouth 2 times a day   -- Indication: For Probiotic

## 2018-05-06 NOTE — PROGRESS NOTE ADULT - SUBJECTIVE AND OBJECTIVE BOX
INTERVAL Hx:  No acute events overnight.  Afebrile.  Denies pain.  Denies passage of stone    MEDICATIONS  (STANDING):  famotidine    Tablet 20 milliGRAM(s) Oral two times a day  FIRST- Mouthwash  BLM 15 milliLiter(s) Swish and Spit daily  sodium chloride 0.9%. 1000 milliLiter(s) (125 mL/Hr) IV Continuous <Continuous>  tamsulosin 0.4 milliGRAM(s) Oral at bedtime    MEDICATIONS  (PRN):  acetaminophen   Tablet. 650 milliGRAM(s) Oral every 6 hours PRN Mild Pain (1 - 3)  acetaminophen   Tablet. 650 milliGRAM(s) Oral every 6 hours PRN Moderate Pain (4 - 6)  ketorolac   Injectable 15 milliGRAM(s) IV Push every 8 hours PRN Moderate Pain (4 - 6)  morphine  - Injectable 2 milliGRAM(s) IV Push every 4 hours PRN Severe Pain (7 - 10)  ondansetron Injectable 4 milliGRAM(s) IV Push every 4 hours PRN Nausea and/or Vomiting        Vital Signs Last 24 Hrs  T(C): 36.7 (06 May 2018 04:19), Max: 37.1 (05 May 2018 14:15)  T(F): 98.1 (06 May 2018 04:19), Max: 98.8 (05 May 2018 14:15)  HR: 93 (06 May 2018 04:19) (88 - 111)  BP: 120/80 (06 May 2018 04:19) (97/67 - 120/80)  BP(mean): --  RR: 16 (06 May 2018 04:19) (16 - 16)  SpO2: 95% (06 May 2018 04:19) (87% - 95%)    PHYSICAL EXAM:    ABDOMEN: soft, NT, no CVAT    Izaguirre: no    LABS:                        13.5   6.8   )-----------( 188      ( 06 May 2018 08:54 )             39.1     05-06    146<H>  |  110<H>  |  10  ----------------------------<  98  3.6   |  30  |  0.48<L>    Ca    8.8      06 May 2018 08:54    TPro  7.8  /  Alb  3.7  /  TBili  0.9  /  DBili  x   /  AST  29  /  ALT  57  /  AlkPhos  92  05-04    Urine culture:  05-04 @ 19:31 --   No growth to date.  Urine culture:  05-04 @ 19:10 --   <10,000 CFU/ml Normal Urogenital stephanie present      Review of Systems:    CONSTITUTIONAL: No weakness, fevers or chills    SKIN: No itching, burning, rashes, or lesions     EYES/ENT: No visual changes;  No vertigo or throat pain     NECK: No pain or stiffness    RESPIRATORY: No cough, wheezing, hemoptysis; No shortness of breath    CARDIOVASCULAR: No chest pain or palpitations    GASTROINTESTINAL: No abdominal or epigastric pain. No nausea, vomiting, diarrhea    NEUROLOGICAL: No numbness or weakness

## 2018-05-06 NOTE — DISCHARGE NOTE ADULT - PATIENT PORTAL LINK FT
You can access the [email or web address] Patient Portal, offered by [facility], by registering with the following website: [email or web address]

## 2018-05-06 NOTE — DISCHARGE NOTE ADULT - CARE PROVIDER_API CALL
Samson Cuevas (DO), Family Medicine  Internal Medicine  [address]  [place] [address]  Phone: [phone]  Fax: [phone]    Kae Benitez), Urology  [address] [place] [address]  [address]  [place] [address]  Phone: [phone]  Fax: [phone]    Davon Infante), Urology  [address] [place] [address] [address]  [place], [place] [id number]  Phone: [phone]  Fax: [phone]

## 2018-05-07 ENCOUNTER — TRANSCRIPTION ENCOUNTER (OUTPATIENT)
Age: 63
End: 2018-05-07

## 2018-05-08 ENCOUNTER — INPATIENT (INPATIENT)
Facility: HOSPITAL | Age: 63
LOS: 0 days | Discharge: HOME | DRG: 694 | End: 2018-05-09
Attending: FAMILY MEDICINE | Admitting: INTERNAL MEDICINE
Payer: COMMERCIAL

## 2018-05-08 VITALS
HEIGHT: 63 IN | WEIGHT: 136.03 LBS | DIASTOLIC BLOOD PRESSURE: 84 MMHG | HEART RATE: 110 BPM | TEMPERATURE: 98 F | SYSTOLIC BLOOD PRESSURE: 131 MMHG | OXYGEN SATURATION: 93 % | RESPIRATION RATE: 14 BRPM

## 2018-05-08 DIAGNOSIS — R73.9 HYPERGLYCEMIA, UNSPECIFIED: ICD-10-CM

## 2018-05-08 DIAGNOSIS — N23 UNSPECIFIED RENAL COLIC: ICD-10-CM

## 2018-05-08 DIAGNOSIS — B02.9 ZOSTER WITHOUT COMPLICATIONS: ICD-10-CM

## 2018-05-08 DIAGNOSIS — Z98.89 OTHER SPECIFIED POSTPROCEDURAL STATES: Chronic | ICD-10-CM

## 2018-05-08 DIAGNOSIS — J43.9 EMPHYSEMA, UNSPECIFIED: ICD-10-CM

## 2018-05-08 LAB
ANION GAP SERPL CALC-SCNC: 8 MMOL/L — SIGNIFICANT CHANGE UP (ref 5–17)
APTT BLD: 30.3 SEC — SIGNIFICANT CHANGE UP (ref 27.5–37.4)
BUN SERPL-MCNC: 13 MG/DL — SIGNIFICANT CHANGE UP (ref 7–23)
CALCIUM SERPL-MCNC: 9.3 MG/DL — SIGNIFICANT CHANGE UP (ref 8.5–10.1)
CHLORIDE SERPL-SCNC: 105 MMOL/L — SIGNIFICANT CHANGE UP (ref 96–108)
CO2 SERPL-SCNC: 29 MMOL/L — SIGNIFICANT CHANGE UP (ref 22–31)
CREAT SERPL-MCNC: 0.78 MG/DL — SIGNIFICANT CHANGE UP (ref 0.5–1.3)
GLUCOSE SERPL-MCNC: 101 MG/DL — HIGH (ref 70–99)
HCT VFR BLD CALC: 40.6 % — SIGNIFICANT CHANGE UP (ref 34.5–45)
HGB BLD-MCNC: 14.3 G/DL — SIGNIFICANT CHANGE UP (ref 11.5–15.5)
INR BLD: 1.04 RATIO — SIGNIFICANT CHANGE UP (ref 0.88–1.16)
MCHC RBC-ENTMCNC: 31.1 PG — SIGNIFICANT CHANGE UP (ref 27–34)
MCHC RBC-ENTMCNC: 35.1 GM/DL — SIGNIFICANT CHANGE UP (ref 32–36)
MCV RBC AUTO: 88.4 FL — SIGNIFICANT CHANGE UP (ref 80–100)
PLATELET # BLD AUTO: 246 K/UL — SIGNIFICANT CHANGE UP (ref 150–400)
POTASSIUM SERPL-MCNC: 3.5 MMOL/L — SIGNIFICANT CHANGE UP (ref 3.5–5.3)
POTASSIUM SERPL-SCNC: 3.5 MMOL/L — SIGNIFICANT CHANGE UP (ref 3.5–5.3)
PROTHROM AB SERPL-ACNC: 11.4 SEC — SIGNIFICANT CHANGE UP (ref 9.8–12.7)
RBC # BLD: 4.6 M/UL — SIGNIFICANT CHANGE UP (ref 3.8–5.2)
RBC # FLD: 11.9 % — SIGNIFICANT CHANGE UP (ref 10.3–14.5)
SODIUM SERPL-SCNC: 142 MMOL/L — SIGNIFICANT CHANGE UP (ref 135–145)
WBC # BLD: 12.4 K/UL — HIGH (ref 3.8–10.5)
WBC # FLD AUTO: 12.4 K/UL — HIGH (ref 3.8–10.5)

## 2018-05-08 PROCEDURE — 99285 EMERGENCY DEPT VISIT HI MDM: CPT

## 2018-05-08 PROCEDURE — 99222 1ST HOSP IP/OBS MODERATE 55: CPT

## 2018-05-08 PROCEDURE — 93010 ELECTROCARDIOGRAM REPORT: CPT

## 2018-05-08 RX ORDER — CEFTRIAXONE 500 MG/1
1 INJECTION, POWDER, FOR SOLUTION INTRAMUSCULAR; INTRAVENOUS EVERY 24 HOURS
Qty: 0 | Refills: 0 | Status: DISCONTINUED | OUTPATIENT
Start: 2018-05-09 | End: 2018-05-09

## 2018-05-08 RX ORDER — SODIUM CHLORIDE 9 MG/ML
1000 INJECTION, SOLUTION INTRAVENOUS
Qty: 0 | Refills: 0 | Status: DISCONTINUED | OUTPATIENT
Start: 2018-05-08 | End: 2018-05-09

## 2018-05-08 RX ORDER — DEXTROSE 50 % IN WATER 50 %
25 SYRINGE (ML) INTRAVENOUS ONCE
Qty: 0 | Refills: 0 | Status: DISCONTINUED | OUTPATIENT
Start: 2018-05-08 | End: 2018-05-09

## 2018-05-08 RX ORDER — PHENAZOPYRIDINE HCL 100 MG
200 TABLET ORAL THREE TIMES A DAY
Qty: 0 | Refills: 0 | Status: DISCONTINUED | OUTPATIENT
Start: 2018-05-08 | End: 2018-05-09

## 2018-05-08 RX ORDER — METOCLOPRAMIDE HCL 10 MG
5 TABLET ORAL ONCE
Qty: 0 | Refills: 0 | Status: DISCONTINUED | OUTPATIENT
Start: 2018-05-08 | End: 2018-05-08

## 2018-05-08 RX ORDER — CEFTRIAXONE 500 MG/1
1 INJECTION, POWDER, FOR SOLUTION INTRAMUSCULAR; INTRAVENOUS ONCE
Qty: 0 | Refills: 0 | Status: COMPLETED | OUTPATIENT
Start: 2018-05-08 | End: 2018-05-08

## 2018-05-08 RX ORDER — ACETAMINOPHEN 500 MG
650 TABLET ORAL ONCE
Qty: 0 | Refills: 0 | Status: DISCONTINUED | OUTPATIENT
Start: 2018-05-08 | End: 2018-05-08

## 2018-05-08 RX ORDER — SODIUM CHLORIDE 9 MG/ML
1000 INJECTION INTRAMUSCULAR; INTRAVENOUS; SUBCUTANEOUS ONCE
Qty: 0 | Refills: 0 | Status: COMPLETED | OUTPATIENT
Start: 2018-05-08 | End: 2018-05-08

## 2018-05-08 RX ORDER — SODIUM CHLORIDE 9 MG/ML
1000 INJECTION, SOLUTION INTRAVENOUS
Qty: 0 | Refills: 0 | Status: DISCONTINUED | OUTPATIENT
Start: 2018-05-08 | End: 2018-05-08

## 2018-05-08 RX ORDER — DEXTROSE 50 % IN WATER 50 %
15 SYRINGE (ML) INTRAVENOUS ONCE
Qty: 0 | Refills: 0 | Status: DISCONTINUED | OUTPATIENT
Start: 2018-05-08 | End: 2018-05-09

## 2018-05-08 RX ORDER — FAMOTIDINE 10 MG/ML
20 INJECTION INTRAVENOUS
Qty: 0 | Refills: 0 | Status: DISCONTINUED | OUTPATIENT
Start: 2018-05-08 | End: 2018-05-09

## 2018-05-08 RX ORDER — TAMSULOSIN HYDROCHLORIDE 0.4 MG/1
0.4 CAPSULE ORAL AT BEDTIME
Qty: 0 | Refills: 0 | Status: DISCONTINUED | OUTPATIENT
Start: 2018-05-08 | End: 2018-05-09

## 2018-05-08 RX ORDER — GLUCAGON INJECTION, SOLUTION 0.5 MG/.1ML
1 INJECTION, SOLUTION SUBCUTANEOUS ONCE
Qty: 0 | Refills: 0 | Status: DISCONTINUED | OUTPATIENT
Start: 2018-05-08 | End: 2018-05-09

## 2018-05-08 RX ORDER — MORPHINE SULFATE 50 MG/1
4 CAPSULE, EXTENDED RELEASE ORAL ONCE
Qty: 0 | Refills: 0 | Status: DISCONTINUED | OUTPATIENT
Start: 2018-05-08 | End: 2018-05-08

## 2018-05-08 RX ORDER — ALBUTEROL 90 UG/1
2 AEROSOL, METERED ORAL EVERY 6 HOURS
Qty: 0 | Refills: 0 | Status: DISCONTINUED | OUTPATIENT
Start: 2018-05-08 | End: 2018-05-09

## 2018-05-08 RX ORDER — OXYCODONE HYDROCHLORIDE 5 MG/1
5 TABLET ORAL EVERY 4 HOURS
Qty: 0 | Refills: 0 | Status: DISCONTINUED | OUTPATIENT
Start: 2018-05-08 | End: 2018-05-08

## 2018-05-08 RX ORDER — DEXTROSE 50 % IN WATER 50 %
12.5 SYRINGE (ML) INTRAVENOUS ONCE
Qty: 0 | Refills: 0 | Status: DISCONTINUED | OUTPATIENT
Start: 2018-05-08 | End: 2018-05-09

## 2018-05-08 RX ORDER — ONDANSETRON 8 MG/1
4 TABLET, FILM COATED ORAL ONCE
Qty: 0 | Refills: 0 | Status: COMPLETED | OUTPATIENT
Start: 2018-05-08 | End: 2018-05-08

## 2018-05-08 RX ORDER — HYDROMORPHONE HYDROCHLORIDE 2 MG/ML
0.5 INJECTION INTRAMUSCULAR; INTRAVENOUS; SUBCUTANEOUS
Qty: 0 | Refills: 0 | Status: DISCONTINUED | OUTPATIENT
Start: 2018-05-08 | End: 2018-05-08

## 2018-05-08 RX ADMIN — SODIUM CHLORIDE 125 MILLILITER(S): 9 INJECTION, SOLUTION INTRAVENOUS at 19:16

## 2018-05-08 RX ADMIN — SODIUM CHLORIDE 125 MILLILITER(S): 9 INJECTION, SOLUTION INTRAVENOUS at 22:27

## 2018-05-08 RX ADMIN — Medication 200 MILLIGRAM(S): at 22:27

## 2018-05-08 RX ADMIN — TAMSULOSIN HYDROCHLORIDE 0.4 MILLIGRAM(S): 0.4 CAPSULE ORAL at 22:27

## 2018-05-08 RX ADMIN — MORPHINE SULFATE 4 MILLIGRAM(S): 50 CAPSULE, EXTENDED RELEASE ORAL at 14:13

## 2018-05-08 RX ADMIN — SODIUM CHLORIDE 2000 MILLILITER(S): 9 INJECTION INTRAMUSCULAR; INTRAVENOUS; SUBCUTANEOUS at 14:13

## 2018-05-08 RX ADMIN — ONDANSETRON 4 MILLIGRAM(S): 8 TABLET, FILM COATED ORAL at 14:13

## 2018-05-08 NOTE — BRIEF OPERATIVE NOTE - POST-OP DX
Hydronephrosis of right kidney  05/08/2018    Active  Davon Infante  Ureteral calculus, right  05/08/2018    Active  Davon Infante

## 2018-05-08 NOTE — ED PROVIDER NOTE - CHIEF COMPLAINT
The patient is a 63y Female complaining of see chief complaint quote. The patient is a 63y Female complaining of flank pain.

## 2018-05-08 NOTE — BRIEF OPERATIVE NOTE - PROCEDURE
<<-----Click on this checkbox to enter Procedure Cystoscopy  05/08/2018  with right retrograde pyelogram, insertion of right double J ureteral stent  Active  SYEDA

## 2018-05-08 NOTE — H&P ADULT - NSHPPHYSICALEXAM_GEN_ALL_CORE
T(C): 37.6 (05-08-18 @ 15:49), Max: 37.6 (05-08-18 @ 15:49)  HR: 100 (05-08-18 @ 15:49) (98 - 110)  BP: 142/86 (05-08-18 @ 15:49) (104/69 - 142/86)  RR: 14 (05-08-18 @ 15:49) (14 - 17)  SpO2: 92% (05-08-18 @ 15:49) (90% - 98%)  Wt(kg): --  I&O's Summary        PHYSICAL EXAM:  GENERAL: NAD, well-groomed, well-developed  HEAD:  Atraumatic, Normocephalic  EYES: EOMI, PERRLA, conjunctiva and sclera clear  ENMT: No tonsillar erythema, exudates, or enlargement; Moist mucous membranes. (+) scab over right cheek.   NECK: Supple, No JVD, Normal thyroid  NERVOUS SYSTEM:  Alert & Oriented X3, Good concentration; Motor Strength 5/5 B/L upper and lower extremities.  CHEST/LUNG: Clear to percussion bilaterally; No rales, rhonchi, wheezing, or rubs  HEART: Regular rate and rhythm; No murmurs, rubs, or gallops  ABDOMEN: Soft, Nontender, Nondistended; Bowel sounds present. (+) tenderness over right Costovertebral angle.   EXTREMITIES:  1+ Peripheral Pulses, No clubbing, cyanosis, or edema  LYMPH: No lymphadenopathy noted  SKIN: (+) scab over right cheek.

## 2018-05-08 NOTE — H&P ADULT - NSHPLABSRESULTS_GEN_ALL_CORE
CT Renal Hunt 5/4/18  Impression:    Moderate obstructive right hydronephrosis secondary to a 4 mm mid right   ureteral calculus.  Additional nonobstructive left nephrolithiasis.  Diverticulosis coli without diverticulitis.      EKG May 8, 2018   Normal sinus rhythm  Possible Left atrial enlargement

## 2018-05-08 NOTE — H&P ADULT - PROBLEM SELECTOR PLAN 1
For OR. RCRI=0 No gross contraindication for low risk procedure. Spoke with  at bedside and anesthesiologist as patient is being wheeled into OR. Follow up post op. On IV Rocephin. For OR. RCRI=0 No gross contraindication for low risk procedure. Spoke with  at bedside and anesthesiologist as patient is being wheeled into OR. Follow up post op. On IV Rocephin. Blood and urine culture from May 4 still no growth. Follow up urine culture which will be send from OR.

## 2018-05-08 NOTE — BRIEF OPERATIVE NOTE - OPERATION/FINDINGS
mod to severe right chronic hydronephrosis with tortuous prox ureter and markedly cloudy urine in kidney; ston ein pelvicd ureter per CT scan

## 2018-05-08 NOTE — H&P ADULT - ASSESSMENT
64 yo white female with history of COPD, ? DM Type on 2 on diet, recently treated for right sided facial Herpes Zoster and Nephrolithiasis who was admitted treated for right pyelonephritis due to right ureteral stone with IV antibiotics and moderate hydronephrosis with Flomax and discharged on May 6, 2018 with out patient follow up presented with complain of increased pain over last 24 hours again and thus presented here for evaluation and stenting by Dr. Infante. No fever or chills. Slight nausea but no vomiting. Recurrent right renal colic due to ureteral stone for OR for cystoscopy and stent placement emergently by Dr. Infante.

## 2018-05-08 NOTE — H&P ADULT - HISTORY OF PRESENT ILLNESS
62 yo white female with history of COPD, ? DM Type on 2 on diet, recently treated for right sided facial Herpes Zoster and Nephrolithiasis who was admitted treated for right pyelonephritis due to right ureteral stone with IV antibiotics and moderate hydronephrosis with Flomax and discharged on May 6, 2018 with out patient follow up presented with complain of increased pain over last 24 hours again and thus presented here for evaluation and stenting by Dr. Infante. No fever or chills. Slight nausea but no vomiting. No chest pain or shortness of breath. Patient was seen in holding with anesthesiologist and  at bedside. No other complaints were made and patient is being taken into OR for procedure for cystoscopy and stent placement by Dr. Infante.     Patient can climb one flight of stairs. No previous history of MI/CVA/CHF/Syncope/CKD/PE/Angina or insulin dependent diabetes mellitus

## 2018-05-08 NOTE — ED ADULT NURSE NOTE - OBJECTIVE STATEMENT
Pt received in bed alert and oriented with the c/o right flank pain. As per Md's orders IV pratima placed blood specimen obtained and sent to the lab. pt stable and nursing care ongoing. Pt's last meal was 8 am and last drink was at 12pm. Pt stable and nursing care ongoing and safety maintained.

## 2018-05-08 NOTE — ED PROVIDER NOTE - OBJECTIVE STATEMENT
62 yo white female with intermittent right flank pain x few days, Dx with 4mm right ureteral stone last week with ??? infection, admitted and ultimately discharged 2-days ago. Over the past day pain has increased again and thus presented here for evaluation and stenting by Dr. Infante. No fever or chills. Slight nausea but no vomiting.

## 2018-05-08 NOTE — CONSULT NOTE ADULT - ASSESSMENT
for OR today, in view of elev WBC, tachycardia and desat (91%), will likely do cysto stent insertion and not definitive treatment of the stone; pt and her  are aware    Thank you

## 2018-05-08 NOTE — CONSULT NOTE ADULT - SUBJECTIVE AND OBJECTIVE BOX
CHIEF COMPLAINT: severe pain right flank and RUQ    HISTORY OF PRESENT ILLNESS: 64 yo female who was admitted here 4 days ago with severe sx as above, admitted for 4 mm stone obstructing right mid ureter and WBC 17K.  Went home 3 days ago, seen in our office 2 days ago.  had acute recurrence of sx early this morning, with pain 8/10.    PAST MEDICAL & SURGICAL HISTORY:  Shingles  Emphysema lung  History of laparoscopic cholecystectomy: 1995      REVIEW OF SYSTEMS:    CONSTITUTIONAL: No weakness, fevers or chills  EYES/ENT: No visual changes;  No vertigo or throat pain   NECK: No pain or stiffness  RESPIRATORY: No cough, wheezing, hemoptysis; No shortness of breath  CARDIOVASCULAR: No chest pain or palpitations  GASTROINTESTINAL: No abdominal or epigastric pain. No nausea, vomiting, or hematemesis; No diarrhea or constipation. No melena or hematochezia.  GENITOURINARY: No dysuria, frequency or hematuria  NEUROLOGICAL: No numbness or weakness  SKIN: No itching, burning, rashes, or lesions   All other review of systems is negative unless indicated above.    MEDICATIONS  (STANDING):  cefTRIAXone   IVPB 1 Gram(s) IV Intermittent once    MEDICATIONS  (PRN):      Allergies    latex (Rash)  penicillin (Rash)    Intolerances        SOCIAL HISTORY:    FAMILY HISTORY:  Family history of heart attack      Vital Signs Last 24 Hrs  T(C): 37.6 (08 May 2018 15:49), Max: 37.6 (08 May 2018 15:49)  T(F): 99.7 (08 May 2018 15:49), Max: 99.7 (08 May 2018 15:49)  HR: 100 (08 May 2018 15:49) (98 - 110)  BP: 142/86 (08 May 2018 15:49) (104/69 - 142/86)  BP(mean): --  RR: 14 (08 May 2018 15:49) (14 - 17)  SpO2: 92% (08 May 2018 15:49) (90% - 98%)    PHYSICAL EXAM: Abd mildly distended    Constitutional: NAD, well-developed  HEENT: MARGOTH, EOMI, Normal Hearing, MMM  Neck: No LAD, No JVD  Back: Normal spine flexure, No CVA tenderness  Respiratory: CTAB   Cardiovascular: S1 and S2, RRR, no M/G/R  Abd: BS+, soft, NT/ND, No CVAT  : Normal phallus,open meatus,bilateral descended testes, no masses  CARROL: Normal prostate, no masses  Extremities: No peripheral edema  Vascular: 2+ peripheral pulses  Neurological: A/O x 3, no focal deficits  Psychiatric: Normal mood, normal affect  Musculoskeletal: 5/5 strength b/l upper and lower extremities  Skin: No rashes    LABS:                        14.3   12.4  )-----------( 246      ( 08 May 2018 14:29 )             40.6     05-08    142  |  105  |  13  ----------------------------<  101<H>  3.5   |  29  |  0.78    Ca    9.3      08 May 2018 14:29      PT/INR - ( 08 May 2018 14:29 )   PT: 11.4 sec;   INR: 1.04 ratio         PTT - ( 08 May 2018 14:29 )  PTT:30.3 sec    Urine Culture:   Hemoglobin: 14.3 g/dL (05-08 @ 14:29)  Hematocrit: 40.6 % (05-08 @ 14:29)      RADIOLOGY & ADDITIONAL STUDIES:

## 2018-05-08 NOTE — H&P ADULT - ATTENDING COMMENTS
Diet: NPO for OR.     RADIOLOGY & ADDITIONAL TESTS:    Imaging Personally Reviewed:  [x ] YES  [ ] NO    Consultant(s) Notes Reviewed:  [x ] YES  [ ] NO    IMPROVE VTE Individual Risk Assessment          RISK                                                          Points    [  ] Previous VTE                                                3    [  ] Thrombophilia                                             2    [  ] Lower limb paralysis                                    2        (unable to hold up >15 seconds)      [  ] Current Cancer                                             2         (within 6 months)    [  ] Immobilization > 24 hrs                              1    [  ] ICU/CCU stay > 24 hours                            1    [ x ] Age > 60                                                    1    IMPROVE VTE Score _____1____    Recommend post op Lovenox for DVT prophylaxis.     DVT Prophylaxis:  Subcu Heparin [  ]     LMWH [ ]     Coumadin [ ]    Xaeralto [ ]    Eliquis [ ]   Venodyne pumps [x]    Discussed with Patient [ x]     Family [x ]          [ ]   RN[x ]      [ ]    Advance Directives: Full code.     Care Discussed with Consultants/Other Providers [ x] YES  [ ] NO              Holding RN, Dr. Hopkins, and anesthesiologist. PMD was notified.    Care plan was explained to patient and  at bedside. In agreement. Questions answered.

## 2018-05-08 NOTE — CHART NOTE - NSCHARTNOTEFT_GEN_A_CORE
Routine follow up post op  S: Reports resolved Right lower flank pain, denies fever chills NVD CP or dyspnea    O; Vitals  Vital Signs Last 24 Hrs  T(C): 37.4 (08 May 2018 20:29), Max: 37.6 (08 May 2018 15:49)  T(F): 99.3 (08 May 2018 20:29), Max: 99.7 (08 May 2018 15:49)  HR: 88 (08 May 2018 20:29) (85 - 110)  BP: 121/80 (08 May 2018 20:29) (104/69 - 142/86)  BP(mean): --  RR: 17 (08 May 2018 20:29) (14 - 17)  SpO2: 91% (08 May 2018 20:29) (90% - 98%)    Exam  Gen NAD  Cor RRR  Pulm CTAB  ABD SNT +BS   no SPT neg CVAt  Ext no edema Jose neg    A/P    1. Post operative state: s/p ureteral stent, cystoscopy Dr Infante. Cont pain regimen PRN. DVT prophylaxis  2. Pre-Diabetes. Per patient, pre DM, last HBA1C WNL. FU HBA1c.

## 2018-05-09 ENCOUNTER — TRANSCRIPTION ENCOUNTER (OUTPATIENT)
Age: 63
End: 2018-05-09

## 2018-05-09 VITALS
OXYGEN SATURATION: 93 % | SYSTOLIC BLOOD PRESSURE: 121 MMHG | TEMPERATURE: 98 F | DIASTOLIC BLOOD PRESSURE: 81 MMHG | RESPIRATION RATE: 16 BRPM | HEART RATE: 95 BPM

## 2018-05-09 LAB
ANION GAP SERPL CALC-SCNC: 7 MMOL/L — SIGNIFICANT CHANGE UP (ref 5–17)
BUN SERPL-MCNC: 10 MG/DL — SIGNIFICANT CHANGE UP (ref 7–23)
CALCIUM SERPL-MCNC: 8.6 MG/DL — SIGNIFICANT CHANGE UP (ref 8.5–10.1)
CHLORIDE SERPL-SCNC: 107 MMOL/L — SIGNIFICANT CHANGE UP (ref 96–108)
CO2 SERPL-SCNC: 29 MMOL/L — SIGNIFICANT CHANGE UP (ref 22–31)
CREAT SERPL-MCNC: 0.5 MG/DL — SIGNIFICANT CHANGE UP (ref 0.5–1.3)
CULTURE RESULTS: SIGNIFICANT CHANGE UP
CULTURE RESULTS: SIGNIFICANT CHANGE UP
GLUCOSE SERPL-MCNC: 90 MG/DL — SIGNIFICANT CHANGE UP (ref 70–99)
HBA1C BLD-MCNC: 6.1 % — HIGH (ref 4–5.6)
HBA1C BLD-MCNC: 6.1 % — HIGH (ref 4–5.6)
HCT VFR BLD CALC: 38 % — SIGNIFICANT CHANGE UP (ref 34.5–45)
HGB BLD-MCNC: 12.7 G/DL — SIGNIFICANT CHANGE UP (ref 11.5–15.5)
MCHC RBC-ENTMCNC: 29.9 PG — SIGNIFICANT CHANGE UP (ref 27–34)
MCHC RBC-ENTMCNC: 33.4 GM/DL — SIGNIFICANT CHANGE UP (ref 32–36)
MCV RBC AUTO: 89.7 FL — SIGNIFICANT CHANGE UP (ref 80–100)
PLATELET # BLD AUTO: 242 K/UL — SIGNIFICANT CHANGE UP (ref 150–400)
POTASSIUM SERPL-MCNC: 3.8 MMOL/L — SIGNIFICANT CHANGE UP (ref 3.5–5.3)
POTASSIUM SERPL-SCNC: 3.8 MMOL/L — SIGNIFICANT CHANGE UP (ref 3.5–5.3)
RBC # BLD: 4.24 M/UL — SIGNIFICANT CHANGE UP (ref 3.8–5.2)
RBC # FLD: 12.2 % — SIGNIFICANT CHANGE UP (ref 10.3–14.5)
SODIUM SERPL-SCNC: 143 MMOL/L — SIGNIFICANT CHANGE UP (ref 135–145)
SPECIMEN SOURCE: SIGNIFICANT CHANGE UP
SPECIMEN SOURCE: SIGNIFICANT CHANGE UP
WBC # BLD: 8.1 K/UL — SIGNIFICANT CHANGE UP (ref 3.8–10.5)
WBC # FLD AUTO: 8.1 K/UL — SIGNIFICANT CHANGE UP (ref 3.8–10.5)

## 2018-05-09 PROCEDURE — 85610 PROTHROMBIN TIME: CPT

## 2018-05-09 PROCEDURE — 93005 ELECTROCARDIOGRAM TRACING: CPT

## 2018-05-09 PROCEDURE — 80048 BASIC METABOLIC PNL TOTAL CA: CPT

## 2018-05-09 PROCEDURE — 86901 BLOOD TYPING SEROLOGIC RH(D): CPT

## 2018-05-09 PROCEDURE — 86900 BLOOD TYPING SEROLOGIC ABO: CPT

## 2018-05-09 PROCEDURE — 99239 HOSP IP/OBS DSCHRG MGMT >30: CPT

## 2018-05-09 PROCEDURE — 36415 COLL VENOUS BLD VENIPUNCTURE: CPT

## 2018-05-09 PROCEDURE — 93971 EXTREMITY STUDY: CPT | Mod: 26,RT

## 2018-05-09 PROCEDURE — C1758: CPT

## 2018-05-09 PROCEDURE — 85730 THROMBOPLASTIN TIME PARTIAL: CPT

## 2018-05-09 PROCEDURE — 86850 RBC ANTIBODY SCREEN: CPT

## 2018-05-09 PROCEDURE — C2617: CPT

## 2018-05-09 PROCEDURE — 85027 COMPLETE CBC AUTOMATED: CPT

## 2018-05-09 PROCEDURE — C1769: CPT

## 2018-05-09 PROCEDURE — 99285 EMERGENCY DEPT VISIT HI MDM: CPT | Mod: 25

## 2018-05-09 PROCEDURE — 87086 URINE CULTURE/COLONY COUNT: CPT

## 2018-05-09 PROCEDURE — 76000 FLUOROSCOPY <1 HR PHYS/QHP: CPT

## 2018-05-09 PROCEDURE — 83036 HEMOGLOBIN GLYCOSYLATED A1C: CPT

## 2018-05-09 PROCEDURE — 93971 EXTREMITY STUDY: CPT

## 2018-05-09 RX ORDER — ALBUTEROL 90 UG/1
2 AEROSOL, METERED ORAL
Qty: 1 | Refills: 0 | OUTPATIENT
Start: 2018-05-09 | End: 2018-06-07

## 2018-05-09 RX ORDER — PHENAZOPYRIDINE HCL 100 MG
1 TABLET ORAL
Qty: 21 | Refills: 0 | OUTPATIENT
Start: 2018-05-09 | End: 2018-05-15

## 2018-05-09 RX ORDER — CEFPODOXIME PROXETIL 100 MG
1 TABLET ORAL
Qty: 2 | Refills: 0 | OUTPATIENT
Start: 2018-05-09 | End: 2018-05-09

## 2018-05-09 RX ORDER — ENOXAPARIN SODIUM 100 MG/ML
40 INJECTION SUBCUTANEOUS DAILY
Qty: 0 | Refills: 0 | Status: DISCONTINUED | OUTPATIENT
Start: 2018-05-09 | End: 2018-05-09

## 2018-05-09 RX ADMIN — FAMOTIDINE 20 MILLIGRAM(S): 10 INJECTION INTRAVENOUS at 05:16

## 2018-05-09 RX ADMIN — Medication 200 MILLIGRAM(S): at 05:16

## 2018-05-09 NOTE — DISCHARGE NOTE ADULT - PLAN OF CARE
Resolution. s/p cystoscopy and stent placement for right ureteral stent. Finish antibiotics and Pyridium for 7 days. Continue Flomax and follow up with urologist for planned stone extraction if needed and stent removal. Diet. Follow up with PMD. HgbA1c pending. PMD will follow up. Stable. Patient was educated to call PMD for reevaluation. Ordered Albuterol inhaler as needed for shortness of breath. Pepcid. If symptoms persist then consider gastroenterology consultation. Follow up with PMD. Diet. Follow up with PMD. HgbA1c 6.1. Follow up with PMD.

## 2018-05-09 NOTE — DISCHARGE NOTE ADULT - CARE PROVIDER_API CALL
Seun Watkins), Urology  [address]  [address]  [place] [address]  Phone: [phone]  Fax: [phone]    Samson Cuevas (), Family Medicine  Internal Medicine  [address] [place] [address]  Phone: [phone]  Fax: [phone]

## 2018-05-09 NOTE — DISCHARGE NOTE ADULT - CARE PLAN
Principal Discharge DX:	Renal colic on right side  Goal:	Resolution.  Assessment and plan of treatment:	s/p cystoscopy and stent placement for right ureteral stent. Finish antibiotics and Pyridium for 7 days. Continue Flomax and follow up with urologist for planned stone extraction if needed and stent removal.  Secondary Diagnosis:	Hyperglycemia  Assessment and plan of treatment:	Diet. Follow up with PMD. HgbA1c pending. PMD will follow up.  Secondary Diagnosis:	Pulmonary emphysema, unspecified emphysema type  Assessment and plan of treatment:	Stable. Patient was educated to call PMD for reevaluation. Ordered Albuterol inhaler as needed for shortness of breath.  Secondary Diagnosis:	Gastroesophageal reflux disease, esophagitis presence not specified  Assessment and plan of treatment:	Pepcid. If symptoms persist then consider gastroenterology consultation. Follow up with PMD. Principal Discharge DX:	Renal colic on right side  Goal:	Resolution.  Assessment and plan of treatment:	s/p cystoscopy and stent placement for right ureteral stent. Finish antibiotics and Pyridium for 7 days. Continue Flomax and follow up with urologist for planned stone extraction if needed and stent removal.  Secondary Diagnosis:	Hyperglycemia  Assessment and plan of treatment:	Diet. Follow up with PMD. HgbA1c 6.1. Follow up with PMD.  Secondary Diagnosis:	Pulmonary emphysema, unspecified emphysema type  Assessment and plan of treatment:	Stable. Patient was educated to call PMD for reevaluation. Ordered Albuterol inhaler as needed for shortness of breath.  Secondary Diagnosis:	Gastroesophageal reflux disease, esophagitis presence not specified  Assessment and plan of treatment:	Pepcid. If symptoms persist then consider gastroenterology consultation. Follow up with PMD.

## 2018-05-09 NOTE — PROGRESS NOTE ADULT - SUBJECTIVE AND OBJECTIVE BOX
The patient was interviewed and evaluated  63y Female    T(C): 36.7 (05-09-18 @ 07:39), Max: 37.6 (05-08-18 @ 15:49)  HR: 95 (05-09-18 @ 07:39) (85 - 110)  BP: 121/81 (05-09-18 @ 07:39) (104/69 - 142/86)  RR: 16 (05-09-18 @ 07:39) (14 - 17)  SpO2: 93% (05-09-18 @ 07:39) (90% - 98%)  Wt(kg): --    Pt seen, doing well, no anesthesia complications or complaints noted or reported.   No Nausea    No additional recommendations.     Pain well controlled
INTERVAL HPI/OVERNIGHT EVENTS: feels well s/p ureteral stent 5/8/18. Has some rt leg cramping, and is going to have a doppler now    MEDICATIONS  (STANDING):  cefTRIAXone   IVPB 1 Gram(s) IV Intermittent every 24 hours  dextrose 5%. 1000 milliLiter(s) (50 mL/Hr) IV Continuous <Continuous>  dextrose 50% Injectable 12.5 Gram(s) IV Push once  dextrose 50% Injectable 25 Gram(s) IV Push once  dextrose 50% Injectable 25 Gram(s) IV Push once  enoxaparin Injectable 40 milliGRAM(s) SubCutaneous daily  famotidine    Tablet 20 milliGRAM(s) Oral two times a day  lactated ringers. 1000 milliLiter(s) (125 mL/Hr) IV Continuous <Continuous>  phenazopyridine 200 milliGRAM(s) Oral three times a day  tamsulosin 0.4 milliGRAM(s) Oral at bedtime    MEDICATIONS  (PRN):  ALBUTerol    90 MICROgram(s) HFA Inhaler 2 Puff(s) Inhalation every 6 hours PRN Shortness of Breath and/or Wheezing  dextrose 40% Gel 15 Gram(s) Oral once PRN Blood Glucose LESS THAN 70 milliGRAM(s)/deciliter  glucagon  Injectable 1 milliGRAM(s) IntraMuscular once PRN Glucose LESS THAN 70 milligrams/deciliter        Vital Signs Last 24 Hrs  T(C): 36.7 (09 May 2018 07:39), Max: 37.6 (08 May 2018 15:49)  T(F): 98 (09 May 2018 07:39), Max: 99.7 (08 May 2018 15:49)  HR: 95 (09 May 2018 07:39) (85 - 110)  BP: 121/81 (09 May 2018 07:39) (104/69 - 142/86)  BP(mean): --  RR: 16 (09 May 2018 07:39) (14 - 17)  SpO2: 93% (09 May 2018 07:39) (90% - 98%)      LABS:                        12.7   8.1   )-----------( 242      ( 09 May 2018 08:38 )             38.0     05-08    142  |  105  |  13  ----------------------------<  101<H>  3.5   |  29  |  0.78    Ca    9.3      08 May 2018 14:29      PT/INR - ( 08 May 2018 14:29 )   PT: 11.4 sec;   INR: 1.04 ratio         PTT - ( 08 May 2018 14:29 )  PTT:30.3 sec

## 2018-05-09 NOTE — PROGRESS NOTE ADULT - PROBLEM SELECTOR PLAN 1
s/p stent 5/8. If labs/doppler OK, pt seems stable for d/c on abiots for 7 days more and pyridium. She will f/u with Dr Infante 1 week.

## 2018-05-09 NOTE — DISCHARGE NOTE ADULT - SECONDARY DIAGNOSIS.
Hyperglycemia Pulmonary emphysema, unspecified emphysema type Gastroesophageal reflux disease, esophagitis presence not specified

## 2018-05-09 NOTE — DISCHARGE NOTE ADULT - HOSPITAL COURSE
62 yo white female with history of COPD, ? DM Type on 2 on diet, recently treated for right sided facial Herpes Zoster and Nephrolithiasis who was admitted treated for right pyelonephritis due to right ureteral stone with IV antibiotics and moderate hydronephrosis with Flomax and discharged on May 6, 2018 with out patient follow up presented with complain of increased pain over last 24 hours again and thus presented here for evaluation and stenting by Dr. Infante. No fever or chills. Slight nausea but no vomiting. No chest pain or shortness of breath. Patient was seen in holding with anesthesiologist and  at bedside. No other complaints were made and patient is being taken into OR for procedure for cystoscopy and stent placement by Dr. Infante. 64 yo white female with history of COPD not on maintenance inhaler, Hyperglycemia on diet, recently treated for right sided facial Herpes Zoster and Nephrolithiasis who was admitted treated for right pyelonephritis due to right ureteral stone with IV antibiotics and moderate hydronephrosis with Flomax and discharged on May 6, 2018 with out patient follow up presented with complain of increased pain over last 24 hours again and thus presented here for evaluation and stenting by Dr. Infante. No fever or chills. Slight nausea but no vomiting. No chest pain or shortness of breath. Patient was seen in holding with anesthesiologist and  at bedside. No other complaints were made and patient is being taken into OR for procedure for cystoscopy and stent placement by Dr. Infante.     T(C): 36.7 (05-09-18 @ 07:39), Max: 37.6 (05-08-18 @ 15:49)  HR: 95 (05-09-18 @ 07:39) (85 - 110)  BP: 121/81 (05-09-18 @ 07:39) (104/69 - 142/86)  RR: 16 (05-09-18 @ 07:39) (14 - 17)  SpO2: 93% (05-09-18 @ 07:39) (90% - 98%)  Wt(kg): --  I&O's Summary    08 May 2018 07:01  -  09 May 2018 07:00  --------------------------------------------------------  IN: 1490 mL / OUT: 650 mL / NET: 840 mL      	PHYSICAL EXAM:  	GENERAL: NAD, well-groomed, well-developed  	HEAD:  Atraumatic, Normocephalic  	EYES: EOMI, PERRLA, conjunctiva and sclera clear  	ENMT: No tonsillar erythema, exudates, or enlargement; Moist mucous membranes. (+) scab over right cheek.   	NECK: Supple, No JVD, Normal thyroid  	NERVOUS SYSTEM:  Alert & Oriented X3, Good concentration; Motor Strength 5/5 B/L upper and lower extremities.  	CHEST/LUNG: Clear to percussion bilaterally; No rales, rhonchi, wheezing, or rubs  	HEART: Regular rate and rhythm; No murmurs, rubs, or gallops  	ABDOMEN: Soft, Nontender, Nondistended; Bowel sounds present. No tenderness over right Costovertebral angle.   	EXTREMITIES:  1+ Peripheral Pulses, No clubbing, cyanosis, or edema  	LYMPH: No lymphadenopathy noted  SKIN: (+) scab over right cheek.    Patient had successful procedure. No more pain. Tolerating diet. No dysuria or urinary symptoms on Pyridium. No gross hematuria. No chest pain or shortness of breath. Complained of right leg pain negative examination as well as doppler us for DVT. Patient would like to go home and as per discussion with Dr. Watkins we will discharge patient home. Education provided about blood tests including pending HgbA1c. Spoke with Dr. Cuevas and updated and requested to follow up including HgbA1c. Patient was encouraged to get PFTs with PCP to avoid sudden exacerbation as not using any inhaler and agreed for Albuterol prescription to be called in. Patient will call for an appointment with Dr. Watkins next week as well as Dr. Cuevas.  is aware as well.

## 2018-05-09 NOTE — DISCHARGE NOTE ADULT - PATIENT PORTAL LINK FT
You can access the [facility], offered by [facility], by registering with the following website: [email or web address]

## 2018-05-09 NOTE — DISCHARGE NOTE ADULT - MEDICATION SUMMARY - MEDICATIONS TO TAKE
I will START or STAY ON the medications listed below when I get home from the hospital:    acetaminophen 325 mg oral tablet  -- 2 tab(s) by mouth every 6 hours, As needed, Mild Pain (1 - 3)  -- Indication: For Pain control     oxycodone-acetaminophen 10 mg-300 mg oral tablet  -- 1 tab(s) by mouth every 6 hours, As Needed -for Moderate to severe pain MDD:4 tabs  -- Caution federal law prohibits the transfer of this drug to any person other  than the person for whom it was prescribed.  May cause drowsiness.  Alcohol may intensify this effect.  Use care when operating dangerous machinery.  This drug may impair the ability to drive or operate machinery.  Use care until you become familiar with its effects.  This prescription cannot be refilled.  This product contains acetaminophen.  Do not use  with any other product containing acetaminophen to prevent possible liver damage.  Using more of this medication than prescribed may cause serious breathing problems.    -- Indication: For Pain control     tamsulosin 0.4 mg oral capsule  -- 1 cap(s) by mouth once a day (at bedtime)  -- Indication: For Renal colic on right side    albuterol 90 mcg/inh inhalation aerosol  -- 2 puff(s) inhaled every 6 hours, As needed, Shortness of Breath and/or Wheezing  -- Indication: For Pulmonary emphysema, unspecified emphysema type    cefpodoxime 200 mg oral tablet  -- 1 tab(s) by mouth every 12 hours to complete 7 days course (patient has 12 pills at home from last week prescription)  -- Finish all this medication unless otherwise directed by prescriber.  Take with food or milk.    -- Indication: For Renal colic on right side s/p stent    famotidine 20 mg oral tablet  -- 1 tab(s) by mouth 2 times a day  -- Indication: For Gastroesophageal reflux disease, esophagitis presence not specified    phenazopyridine 200 mg oral tablet  -- 1 tab(s) by mouth 3 times a day  -- Indication: For Renal colic on right side    lactobacillus acidophilus oral capsule  -- 1 cap(s) by mouth 2 times a day   -- Indication: For Prophylaxis I will START or STAY ON the medications listed below when I get home from the hospital:    acetaminophen 325 mg oral tablet  -- 2 tab(s) by mouth every 6 hours, As needed, Mild Pain (1 - 3)  -- Indication: For Pain control     oxycodone-acetaminophen 10 mg-300 mg oral tablet  -- 1 tab(s) by mouth every 6 hours, As Needed -for Moderate to severe pain MDD:4 tabs  -- Caution federal law prohibits the transfer of this drug to any person other  than the person for whom it was prescribed.  May cause drowsiness.  Alcohol may intensify this effect.  Use care when operating dangerous machinery.  This drug may impair the ability to drive or operate machinery.  Use care until you become familiar with its effects.  This prescription cannot be refilled.  This product contains acetaminophen.  Do not use  with any other product containing acetaminophen to prevent possible liver damage.  Using more of this medication than prescribed may cause serious breathing problems.    -- Indication: For Pain control     tamsulosin 0.4 mg oral capsule  -- 1 cap(s) by mouth once a day (at bedtime)  -- Indication: For Renal colic on right side    albuterol 90 mcg/inh inhalation aerosol  -- 2 puff(s) inhaled every 6 hours, As needed, Shortness of Breath and/or Wheezing  -- Indication: For Pulmonary emphysema, unspecified emphysema type    cefpodoxime 200 mg oral tablet  -- 1 tab(s) by mouth every 12 hours to complete 7 days course (patient has 12 pills at home from last week prescription)  -- Finish all this medication unless otherwise directed by prescriber.  Take with food or milk.    -- Indication: For Renal colic on right side status post stent placement    famotidine 20 mg oral tablet  -- 1 tab(s) by mouth 2 times a day  -- Indication: For Gastroesophageal reflux disease, esophagitis presence not specified    phenazopyridine 200 mg oral tablet  -- 1 tab(s) by mouth 3 times a day  -- Indication: For Renal colic on right side    lactobacillus acidophilus oral capsule  -- 1 cap(s) by mouth 2 times a day   -- Indication: For Prophylaxis

## 2018-05-09 NOTE — DISCHARGE NOTE ADULT - ADDITIONAL INSTRUCTIONS
See Dr. Cuevas next week for follow up including HgbA1c.  See Dr. Watkins next week for urology follow up.

## 2018-05-10 LAB
CULTURE RESULTS: SIGNIFICANT CHANGE UP
CULTURE RESULTS: SIGNIFICANT CHANGE UP
SPECIMEN SOURCE: SIGNIFICANT CHANGE UP
SPECIMEN SOURCE: SIGNIFICANT CHANGE UP

## 2018-05-18 ENCOUNTER — OUTPATIENT (OUTPATIENT)
Dept: OUTPATIENT SERVICES | Facility: HOSPITAL | Age: 63
LOS: 1 days | End: 2018-05-18
Payer: COMMERCIAL

## 2018-05-18 VITALS
HEIGHT: 63 IN | HEART RATE: 94 BPM | TEMPERATURE: 98 F | WEIGHT: 134.92 LBS | SYSTOLIC BLOOD PRESSURE: 128 MMHG | RESPIRATION RATE: 15 BRPM | DIASTOLIC BLOOD PRESSURE: 81 MMHG

## 2018-05-18 DIAGNOSIS — Z01.818 ENCOUNTER FOR OTHER PREPROCEDURAL EXAMINATION: ICD-10-CM

## 2018-05-18 DIAGNOSIS — Z41.9 ENCOUNTER FOR PROCEDURE FOR PURPOSES OTHER THAN REMEDYING HEALTH STATE, UNSPECIFIED: Chronic | ICD-10-CM

## 2018-05-18 DIAGNOSIS — Z98.89 OTHER SPECIFIED POSTPROCEDURAL STATES: Chronic | ICD-10-CM

## 2018-05-18 DIAGNOSIS — N20.1 CALCULUS OF URETER: ICD-10-CM

## 2018-05-18 LAB
AMYLASE P1 CFR SERPL: 43 U/L — SIGNIFICANT CHANGE UP (ref 25–115)
APPEARANCE UR: ABNORMAL
BILIRUB UR-MCNC: NEGATIVE — SIGNIFICANT CHANGE UP
COLOR SPEC: YELLOW — SIGNIFICANT CHANGE UP
DIFF PNL FLD: ABNORMAL
GLUCOSE UR QL: NEGATIVE — SIGNIFICANT CHANGE UP
KETONES UR-MCNC: NEGATIVE — SIGNIFICANT CHANGE UP
LEUKOCYTE ESTERASE UR-ACNC: ABNORMAL
LIDOCAIN IGE QN: 138 U/L — SIGNIFICANT CHANGE UP (ref 73–393)
NITRITE UR-MCNC: NEGATIVE — SIGNIFICANT CHANGE UP
PH UR: 7 — SIGNIFICANT CHANGE UP (ref 5–8)
PROT UR-MCNC: 75 MG/DL
SP GR SPEC: 1.01 — SIGNIFICANT CHANGE UP (ref 1.01–1.02)
UROBILINOGEN FLD QL: NEGATIVE — SIGNIFICANT CHANGE UP

## 2018-05-18 PROCEDURE — 87086 URINE CULTURE/COLONY COUNT: CPT

## 2018-05-18 PROCEDURE — 86900 BLOOD TYPING SEROLOGIC ABO: CPT

## 2018-05-18 PROCEDURE — 86901 BLOOD TYPING SEROLOGIC RH(D): CPT

## 2018-05-18 PROCEDURE — 36415 COLL VENOUS BLD VENIPUNCTURE: CPT

## 2018-05-18 PROCEDURE — 83690 ASSAY OF LIPASE: CPT

## 2018-05-18 PROCEDURE — 86850 RBC ANTIBODY SCREEN: CPT

## 2018-05-18 PROCEDURE — G0463: CPT

## 2018-05-18 PROCEDURE — 81001 URINALYSIS AUTO W/SCOPE: CPT

## 2018-05-18 PROCEDURE — 86803 HEPATITIS C AB TEST: CPT

## 2018-05-18 PROCEDURE — 82150 ASSAY OF AMYLASE: CPT

## 2018-05-18 NOTE — H&P PST ADULT - NSANTHOSAYNRD_GEN_A_CORE
No. LORNA screening performed.  STOP BANG Legend: 0-2 = LOW Risk; 3-4 = INTERMEDIATE Risk; 5-8 = HIGH Risk

## 2018-05-18 NOTE — H&P PST ADULT - ASSESSMENT
62 yo F for cystoscopy   right ureteroscopy  Holmium laser litholotripsy  - stent exchange      Med cl on chart

## 2018-05-18 NOTE — H&P PST ADULT - PSH
Elective surgery  hernia repair 2014  History of laparoscopic cholecystectomy  1995  S/P cystoscopy  with right renal stent insertion 5/8/18

## 2018-05-19 LAB
CULTURE RESULTS: SIGNIFICANT CHANGE UP
HCV AB S/CO SERPL IA: 0.16 S/CO — SIGNIFICANT CHANGE UP
HCV AB SERPL-IMP: SIGNIFICANT CHANGE UP
SPECIMEN SOURCE: SIGNIFICANT CHANGE UP

## 2018-05-24 ENCOUNTER — TRANSCRIPTION ENCOUNTER (OUTPATIENT)
Age: 63
End: 2018-05-24

## 2018-05-24 RX ORDER — SODIUM CHLORIDE 9 MG/ML
1000 INJECTION, SOLUTION INTRAVENOUS
Qty: 0 | Refills: 0 | Status: DISCONTINUED | OUTPATIENT
Start: 2018-05-25 | End: 2018-05-25

## 2018-05-25 ENCOUNTER — OUTPATIENT (OUTPATIENT)
Dept: OUTPATIENT SERVICES | Facility: HOSPITAL | Age: 63
LOS: 1 days | End: 2018-05-25
Payer: COMMERCIAL

## 2018-05-25 ENCOUNTER — RESULT REVIEW (OUTPATIENT)
Age: 63
End: 2018-05-25

## 2018-05-25 VITALS
HEART RATE: 100 BPM | OXYGEN SATURATION: 94 % | RESPIRATION RATE: 12 BRPM | DIASTOLIC BLOOD PRESSURE: 72 MMHG | SYSTOLIC BLOOD PRESSURE: 98 MMHG

## 2018-05-25 VITALS
DIASTOLIC BLOOD PRESSURE: 79 MMHG | HEIGHT: 63 IN | TEMPERATURE: 98 F | RESPIRATION RATE: 16 BRPM | SYSTOLIC BLOOD PRESSURE: 127 MMHG | HEART RATE: 107 BPM | WEIGHT: 134.92 LBS | OXYGEN SATURATION: 94 %

## 2018-05-25 DIAGNOSIS — N20.1 CALCULUS OF URETER: ICD-10-CM

## 2018-05-25 DIAGNOSIS — Z41.9 ENCOUNTER FOR PROCEDURE FOR PURPOSES OTHER THAN REMEDYING HEALTH STATE, UNSPECIFIED: Chronic | ICD-10-CM

## 2018-05-25 DIAGNOSIS — Z98.89 OTHER SPECIFIED POSTPROCEDURAL STATES: Chronic | ICD-10-CM

## 2018-05-25 PROCEDURE — 88300 SURGICAL PATH GROSS: CPT | Mod: 26

## 2018-05-25 RX ORDER — CEFTRIAXONE 500 MG/1
1 INJECTION, POWDER, FOR SOLUTION INTRAMUSCULAR; INTRAVENOUS EVERY 24 HOURS
Qty: 0 | Refills: 0 | Status: DISCONTINUED | OUTPATIENT
Start: 2018-05-25 | End: 2018-05-25

## 2018-05-25 RX ORDER — PHENAZOPYRIDINE HCL 100 MG
1 TABLET ORAL
Qty: 15 | Refills: 0 | OUTPATIENT
Start: 2018-05-25 | End: 2018-05-29

## 2018-05-25 RX ORDER — SODIUM CHLORIDE 9 MG/ML
1000 INJECTION INTRAMUSCULAR; INTRAVENOUS; SUBCUTANEOUS
Qty: 0 | Refills: 0 | Status: DISCONTINUED | OUTPATIENT
Start: 2018-05-25 | End: 2018-05-25

## 2018-05-25 RX ORDER — CEFUROXIME AXETIL 250 MG
1 TABLET ORAL
Qty: 14 | Refills: 0
Start: 2018-05-25 | End: 2018-05-31

## 2018-05-25 RX ORDER — ONDANSETRON 8 MG/1
4 TABLET, FILM COATED ORAL ONCE
Qty: 0 | Refills: 0 | Status: DISCONTINUED | OUTPATIENT
Start: 2018-05-25 | End: 2018-05-25

## 2018-05-25 RX ORDER — PHENAZOPYRIDINE HCL 100 MG
1 TABLET ORAL
Qty: 15 | Refills: 0
Start: 2018-05-25 | End: 2023-11-01

## 2018-05-25 RX ORDER — SODIUM CHLORIDE 9 MG/ML
1000 INJECTION, SOLUTION INTRAVENOUS
Qty: 0 | Refills: 0 | Status: DISCONTINUED | OUTPATIENT
Start: 2018-05-25 | End: 2018-05-25

## 2018-05-25 RX ORDER — HYDROMORPHONE HYDROCHLORIDE 2 MG/ML
0.5 INJECTION INTRAMUSCULAR; INTRAVENOUS; SUBCUTANEOUS ONCE
Qty: 0 | Refills: 0 | Status: DISCONTINUED | OUTPATIENT
Start: 2018-05-25 | End: 2018-05-25

## 2018-05-25 RX ADMIN — SODIUM CHLORIDE 75 MILLILITER(S): 9 INJECTION, SOLUTION INTRAVENOUS at 10:12

## 2018-05-25 RX ADMIN — SODIUM CHLORIDE 100 MILLILITER(S): 9 INJECTION, SOLUTION INTRAVENOUS at 12:47

## 2018-05-25 RX ADMIN — SODIUM CHLORIDE 75 MILLILITER(S): 9 INJECTION INTRAMUSCULAR; INTRAVENOUS; SUBCUTANEOUS at 10:37

## 2018-05-25 RX ADMIN — CEFTRIAXONE 100 GRAM(S): 500 INJECTION, POWDER, FOR SOLUTION INTRAMUSCULAR; INTRAVENOUS at 10:31

## 2018-05-25 NOTE — ASU DISCHARGE PLAN (ADULT/PEDIATRIC). - MEDICATION SUMMARY - MEDICATIONS TO TAKE
I will START or STAY ON the medications listed below when I get home from the hospital:    tamsulosin 0.4 mg oral capsule  -- 1 cap(s) by mouth once a day (at bedtime)  -- Indication: For takes at home    cefuroxime 500 mg oral tablet  -- 1 tab(s) by mouth every 12 hours   -- Finish all this medication unless otherwise directed by prescriber.  Medication should be taken with plenty of water.  Take with food or milk.    -- Indication: For antibiotic    phenazopyridine 200 mg oral tablet  -- 1 tab(s) by mouth 3 times a day (after meals)   -- May discolor urine or feces.  Medication should be taken with plenty of water.  Take with food or milk.    -- Indication: For for burning on urination

## 2018-05-25 NOTE — BRIEF OPERATIVE NOTE - PROCEDURE
<<-----Click on this checkbox to enter Procedure Cystoscopy  05/25/2018  with right retrograde pyelogram and insetion of right doube J ureteral stent 4.8 F x 22-30 cm  Active  ELIEBERM  Ureteroscopy  05/25/2018  right, semirigid, with stone basketing and holmium laser litholapaxy  Active  ELIEBERM

## 2018-05-25 NOTE — ASU DISCHARGE PLAN (ADULT/PEDIATRIC). - NOTIFY
Inability to Tolerate Liquids or Foods/Fever greater than 101/Unable to Urinate/Bleeding that does not stop

## 2018-05-29 LAB — SURGICAL PATHOLOGY FINAL REPORT - CH: SIGNIFICANT CHANGE UP

## 2018-05-29 PROCEDURE — 76000 FLUOROSCOPY <1 HR PHYS/QHP: CPT

## 2018-05-29 PROCEDURE — C1758: CPT

## 2018-05-29 PROCEDURE — 82365 CALCULUS SPECTROSCOPY: CPT

## 2018-05-29 PROCEDURE — 88300 SURGICAL PATH GROSS: CPT

## 2018-05-29 PROCEDURE — C1769: CPT

## 2018-05-29 PROCEDURE — C2617: CPT

## 2018-05-29 PROCEDURE — 52356 CYSTO/URETERO W/LITHOTRIPSY: CPT | Mod: RT

## 2018-05-29 PROCEDURE — C1889: CPT

## 2018-06-01 LAB — COMPN STONE: SIGNIFICANT CHANGE UP

## 2019-01-09 NOTE — ASU PREOP CHECKLIST - PATIENT'S PERSONAL PROPERTY GIVEN TO
Chief Complaint   Patient presents with   • Nasal Congestion     C/O nasal congestion with morning discharge described as green - ONSET: 3 wks   • Cough     C/O nonproductive cough         HISTORY OF PRESENT ILLNESS:   The patient is a 48 year old male who presents with complaints of cold, congestion, runny nose, cough, which is been going on for the last weeks. Denies any nausea, vomiting or difficulty breathing, wheezing, chest pain. Hasn't taken anything over-the-counter.    PAST MEDICAL HISTORY, PAST SURGICAL HISTORY, SOCIAL HISTORY, MEDICATIONS, AND ALLERGIES:  Reviewed per electronic chart.    REVIEW OF SYSTEMS:  Constitutional:  Denies fever or chills   Eyes:  Denies change in visual acuity   Respiratory:  Per History of Present Illness   Cardiovascular:  Denies chest pain or edema   Gastrointestinal:  Denies abdominal pain, nausea, vomiting, bloody stools or diarrhea   Genitourinary:  Denies dysuria   Musculoskeletal:  Denies back pain or joint pain   Integument:  Denies rash       PHYSICAL EXAM:  Vitals:  Vitals:    01/06/19 1603   BP: 116/74   Pulse: 89   Temp: 98.3 °F (36.8 °C)     Constitutional:  No acute distress, nontoxic appearance.  HENT: Normocephalic, atraumatic. Bilateral external ears normal. Oropharynx moist. No oral exudates. Nose inflamed. Mucous membranes with swollen turbinates. Serous fluid present behind the bilateral tympanic membranes. No inflammation.  Eyes:  PERRLA (pupils equal, round, and reactive to light and accommodation), EOMI (extraocular movements are intact). Conjunctivae normal. No discharge.  Neck:  Normal range of motion. No tenderness. Supple. No lymphadenopathy. No stridor.  Cardiovascular:  Normal heart rate. Normal rhythm. No murmurs. No rubs. No gallops.  Pulmonary/Chest:  Normal breath sounds. No respiratory distress. No wheezing. No chest tenderness.  Abdomen:  Bowel sounds normal. Soft. No tenderness. No masses. No pulsatile masses.    Lab/Radiology:  Orders Placed  This Encounter   • azithromycin (ZITHROMAX Z-JERO) 250 MG tablet   • promethazine-dextromethorphan (PROMETHAZINE-DM) 6.25-15 MG/5ML syrup       ASSESSMENT:   Encounter Diagnoses   Name Primary?   • Acute URI Yes       PLAN: Symptomatic care reviewed. Zithromax five-day course, promethazine DM for cough. Smoking cessation reviewed. Follow-up with primary    Follow up with primary if no improvement of symptoms or worsening. Patient acknowledged understanding of the instructions.     on unit

## 2019-08-05 NOTE — PATIENT PROFILE ADULT. - URINARY CATHETER
Instructed patient/caregiver regarding signs and symptoms of infection./Elevate the injured extremity as instructed./Keep the cast/splint/dressing clean and dry./Verbal/written post procedure instructions were given to patient/caregiver./Instructed patient/caregiver to follow-up with primary care physician. no

## 2019-08-12 NOTE — ED PROVIDER NOTE - CPE EDP CARDIAC NORM
"                                                    Physical Therapy Daily Note     Name: Carlos Torre  Clinic Number: [id number]  Diagnosis: Muscle weakness of lower extremity   Physician: Geo Morgan MD    Evaluation Date: 8/1/2019  Authorization Period Expiration: 7/25/2020  Plan of Care Expiration: 9/26/19  Visit # / Visits authorized: 3/20   FOTO: 3/10     Gcode: 3/10  Visit: 88.10  Total: 289.40     Time In: 2:01  Time Out: 2:45  Total Billable Time: 44 minutes      Precautions: Standard, Hx of Hep C    Subjective     Pt reports  He had a quiet weekend and was off of work today so his legs are not really bothering him today.   Pain Scale: Carlos rates pain at BLE's on a scale of 0-10 to be 1 currently.    Objective     Carlos received therapeutic exercises to develop strength, endurance and flexibility for 34 minutes including:     Bike 5' supervised    Quad sets 5" 20x  Prone quad stretch 20" 3x   LTR's 5" 20x  Clamshells 2x10   SL abduction 2x10- min assist from PT  SLR flexion 2x10  Bridge 2x10  SAQs 2# 3x10    Sit<>stand 2x10       Carlos received the following manual therapy techniques: Soft tissue Mobilization were applied to the: Quads for 10 minutes including:    STM to (B) quads with hawk        Written Home Exercises Provided: yes.  Exercises were reviewed and Carlos was able to demonstrate them prior to the end of the session.  Carlos demonstrated good  understanding of the education provided.      See EMR under Patient Instructions for exercises provided 8/1/2019.     Education provided re: cont HEP  Carlos verbalized good understanding of education provided.   No spiritual or educational barriers to learning provided    Assessment     Patient tolerated first full treatment well today with no increase in symptoms. Patient required less assistance today for SLR abduction due to weakness. Pt reported quads feeling fatigued at end of SLR flexion but tolerated all other therex well. "   This is a 69 y.o. male referred to outpatient physical therapy and presents with a medical diagnosis of chronic pain of both knees and unsteady gait and demonstrates limitations as described in the problem list. Pt prognosis is Good. Pt will continue to benefit from skilled outpatient physical therapy to address the deficits listed in the problem list, provide pt/family education and to maximize pt's level of independence in the home and community environment.     Goals as follows:    Short Term Goals: 4 weeks   1. Patient will be independent with HEP.  2. Patient will demonstrated good body mechanics.  3. Patient will decrease pain in B thighs at end of day to 5/10.     Long Term Goals: 8 weeks   1. Patient will decrease pain in B thighs at end of day to 3/10.  2. Patient will improve B hip flexor strength to 5/5.  3. Patient will improve FOTO score to 15% limitation.   4. Patient will improve sit<>stand test to 15 reps to better perform daily functional activity.     Plan     Continue with established Plan of Care towards PT goals.    Therapist: Sydney Morgan, PT, DPT  8/12/2019   normal...

## 2019-11-21 ENCOUNTER — TRANSCRIPTION ENCOUNTER (OUTPATIENT)
Age: 64
End: 2019-11-21

## 2020-01-01 NOTE — PATIENT PROFILE ADULT. - AS SC BRADEN SENSORY
2 Week Well Child Visit      HISTORIAN: mother    Chief Complaint   Patient presents with   • Well Infant Birth to 23 Months       HISTORY:  CONCERNS:  1. [place] screen: Informed mom that Manny's  screen was normal.      INTERVAL HISTORY:  Birth history, medical history, surgical history, and family history reviewed and updated.  No recent illnesses or injuries.  Medications and allergies reviewed and updated.    Birth History   • Birth     Length: 20\" (50.8 cm)     Weight: 3.65 kg     HC 34 cm (13.39\")   • Apgar     One: 8.0     Five: 9.0   • Delivery Method: Vaginal, Spontaneous   • Gestation Age: 39 1/7 wks   • Duration of Labor: 1st: 5h 14m / 2nd: 19m       Patient Active Problem List   Diagnosis   • Single liveborn, born in hospital, delivered by vaginal delivery       History reviewed. No pertinent past medical history.    History reviewed. No pertinent surgical history.    Family History   Problem Relation Age of Onset   • Patient is unaware of any medical problems Mother    • Diabetes Father         type 1   • Other Maternal Grandmother         Factor V Leiden   • Heart disease Other         2 paternal great uncles   • Patient is unaware of any medical problems Brother    • Patient is unaware of any medical problems Maternal Grandfather    • Patient is unaware of any medical problems Paternal Grandmother    • Patient is unaware of any medical problems Paternal Grandfather    • Cancer, Pancreatic Maternal Great-Grandfather    • Heart disease Maternal Great-Grandfather    • Cancer Maternal Great-Grandfather         Bone   • Heart disease Maternal Great-Grandfather    • Cancer, Skin Maternal Great-Grandmother    • Cancer Maternal Great-Grandmother         Possible liver cancer   • Diabetes Maternal Great-Grandmother        Current Outpatient Medications   Medication Sig Dispense Refill   • Lactobacillus Reuteri-Vit D ([place] Soothe Vit D Probiotic) 400 UNIT/5DROP Liquid Take 5 drops by mouth daily.        No current facility-administered medications for this visit.        ALLERGIES:  No Known Allergies      SOCIAL/FAMILY HISTORY:  Parental Adjustment: Doing well.   Sibling Adjustment: Doing well.  Lives with: Mom, dad, and brother (2 years old). They have 1 dog.  Toxic Exposure:   Tobacco Use: Never             REVIEW OF SYSTEMS:  Nutrition: Expressed breast milk, 1.5-2 ounces every 3-4 hours during the day and night (closer to 4 hours overnight).    Spitting up? Small amounts with some feeds.    Elimination: She has 6-8 wet diapers each day, and bowel movements are every other day. Stools are yellow.    Sleep: Parents room and bassinet, or own room and crib. She sleeps on her back.    Behavior: Happy    Safety: In rear facing car seat      DEVELOPMENT:  · Able to lift head when on tummy: YES  · Symmetrical movements: YES  · Blinks in reaction to bright light: YES  · Responds to sound: YES  · Follows objects to midline:YES      PHYSICAL EXAM:  Visit Vitals  Pulse 148   Temp 99.2 °F (37.3 °C)   Resp 38   Ht 21\" (53.3 cm)   Wt 3.7 kg   HC 36.4 cm (14.33\")   BMI 13.00 kg/m²     Weight change since birth: 1%  GENERAL:  Well appearing female infant, nontoxic, no acute distress.  Alert and interactive.  SKIN:  Warm, normal turgor. No cyanosis.  No jaundice. No bruises or lesions. Mild scattered erythema toxicum rash.  HEAD:  Normocephalic, atraumatic.  Anterior fontanelle open, soft and flat. No caput or cephalohematoma.  EYES:  Conjunctivae clear. Non-injected, non-icteric. Pupils equal, round, and reactive to light. Extraocular movements intact.  Red reflex bilaterally.  NOSE:  No discharge. Normal nasal mucosa. No flaring.  EARS:  Normal external ears.  THROAT:  Oropharynx with moist mucus membranes and no lesions.  NECK:  Full range of motion, no lymphadenopathy or masses.  CHEST: Breast buds.  HEART:  Regular rate and rhythm.  Quiet precordium.  Normal S1, S2.  No murmurs, rubs, gallops. Equal femoral  pulses.  LUNGS:  Clear to auscultation bilaterally.  No wheezes, rales, or rhonchi. No increased work of breathing. No retractions.  ABDOMEN:  Soft, nontender, and nondistended. Positive bowel sounds. No organomegaly or masses. Umbilical cord stump has fallen off.  :  Laron 1 female, and no labial adhesions or lesions.  MUSCULOSKELETAL:  Hips within normal range of motion.  Negative Gold, and Ortolani.  Spine straight.  Normal sacrum.  EXTREMITIES:  Warm, dry, without abnormalities.  NEURO:  Normal tone, bulk, strength.      ASSESSMENT:  1. Health examination for  8 to 28 days old    2. Erythema toxicum        PLAN:   1. All parental concerns and questions discussed. Discussed with mom that the breast buds will go away with time, probably over the next 1-2 months. Growth charts reviewed. Mom is aware that Manny is past her birth weight, which is the goal by the 2 week visit. Anticipatory guidance provided, including feeding, sleep, safety and development. Discussed fevers, safe sleep (SIDS), shaken baby syndrome, tummy time, and after hours nurse line. She is already taking Vitamin D.    2. Discussed with mom that this is a normal  rash, that will resolve on its own.    See patient instructions for additional written instructions given. All mom's questions were answered.      FOLLOW UP:  Return to clinic for 2 month well child exam, or sooner as needed for illness or concerns.      GRACE [place]   (4) no impairment

## 2020-09-01 NOTE — ASU PREOP CHECKLIST - BP NONINVASIVE DIASTOLIC (MM HG)
Patient has been experiencing UTI symptoms. She says she had side pain, frequent urination along with Nausea. She did take a home test that came back positive. She is wondering if she can get antibiotic ?    80

## 2020-09-02 NOTE — ED PROVIDER NOTE - RADIATION
ED Attending Physician Note      Patient : Loco Barr Age: 80 year old Sex: male   MRN: [id number] Encounter Date: 9/2/2020      History     Chief Complaint   Patient presents with   • Shortness of Breath   80-year-old male with history of hypertension, hyperlipidemia, coronary artery disease status post PCI, A. fib on warfarin, who presents for evaluation of shortness of breath.  For the past 3 to 4 days he describes that he is felt short of breath with exertion.  For instance he is very active and swims 3 times a week.  He typically reports he can swim 25-40 laps without difficulty but reports he is only been sitting about 25 laps and it is more difficult than usual because he feels more fatigued and short of breath.  He denies any associated chest pain dizziness, lightheadedness, nausea, vomiting, diaphoresis, arm or jaw pain.  He does report at night his symptoms are worse particularly with laying flat.  He has chronic left lower extremity swelling which has been present for years but denies any new or worsening swelling.  He does describes having some calf cramping over the past 3 to 4 days.  No fevers, chills, cough.  No recent travel history or immobilization.  No sick contacts.  He does not smoke or use illicit drugs.    Review of Systems:   Constitutional: No fevers or chills  Head: normacephalic/atraumatic  Cardiovascular: No chest pain or orthopnea  Respiratory: As above  GI: No nausea, vomiting, diarrhea  : No dysuria, flank pain  HEME: No easy bruising, bleeding  Ext: No joint pain or swelling  Neuro: No localized weakness  Skin: No rash.    ALLERGIES:  No Known Allergies     Past medical history: As above  Social History: As above      Physical Exam     ED Triage Vitals [09/02/20 1153]   ED Triage Vitals Group      Temp 97.5 °F (36.4 °C)      Heart Rate 79      Resp 18      BP (!) 183/71      SpO2 96 %      EtCO2 mmHg       Height 6' 1\" (1.854 m)      Weight 284 lb 6.3 oz (129 kg)      Weight Scale  Used Standing scale      BMI (Calculated) 37.52      IBW/kg (Calculated) 79.9       PHYSICAL EXAM:   General Appearance: Alert, no apparent distress, well-appearing and in no apparent distress  HEENT: normocephalic, atraumatic  CV: regular rate and rhythm, no murmurs. Distal pulses intake and symmetric  Resp: CTA bilaterally  Abdomen: Soft, nontender, nondistended  EXT: No edema. No unilateral swelling. No calf tenderness  Skin: warm and well perfused. No rashes  Neuro: Alert, moving all extremities, no focal deficits     MDM patient with subjective shortness of breath on exertion but no chest pain.  No oxygen requirement and is hemodynamically stable.  Would be concern for possible anginal equivalent/ACS versus PE or DVT versus CHF.  Overall my clinical suspicion of PE is low.  Will screen with a vascular duplex as well as d-dimer.  Will screen with troponin.  Clinically symptoms are not consistent with dissection.    ED Course     Procedures    Lab Results     Results for orders placed or performed during the hospital encounter of 09/02/20   CBC with Automated Differential   Result Value Ref Range    WBC 6.7 4.2 - 11.0 K/mcL    RBC 4.64 4.50 - 5.90 mil/mcL    HGB 14.7 13.0 - 17.0 g/dL    HCT 43.9 39.0 - 51.0 %    MCV 94.6 78.0 - 100.0 fl    MCH 31.7 26.0 - 34.0 pg    MCHC 33.5 32.0 - 36.5 g/dL    RDW-CV 12.3 11.0 - 15.0 %     140 - 450 K/mcL    NRBC 0 0 /100 WBC    DIFF TYPE AUTOMATED DIFFERENTIAL     Neutrophil 68 %    LYMPH 18 %    MONO 11 %    EOSIN 2 %    BASO 1 %    Percent Immature Granuloctyes 0 %    Absolute Neutrophil 4.6 1.8 - 7.7 K/mcL    Absolute Lymph 1.2 1.0 - 4.0 K/mcL    Absolute Mono 0.7 0.3 - 0.9 K/mcL    Absolute Eos 0.1 0.1 - 0.5 K/mcL    Absolute Baso 0.0 0.0 - 0.3 K/mcL    Absolute Immature Granulocytes 0.0 0 - 0.2 K/mcl   Basic Metabolic Panel   Result Value Ref Range    Sodium 138 135 - 145 mmol/L    Potassium 3.9 3.4 - 5.1 mmol/L    Chloride 106 98 - 107 mmol/L    Carbon Dioxide 28  21 - 32 mmol/L    Anion Gap 8 (L) 10 - 20 mmol/L    Glucose 125 (H) 65 - 99 mg/dL    BUN 20 6 - 20 mg/dL    Creatinine 0.90 0.67 - 1.17 mg/dL    GFR Estimate,  >90     GFR Estimate, Non African American 81     BUN/Creatinine Ratio 22 7 - 25    CALCIUM 8.9 8.4 - 10.2 mg/dL   Prothrombin Time   Result Value Ref Range    PROTIME 27.3 (H) 9.7 - 11.8 sec    INR 2.6    D Dimer, Quantitative   Result Value Ref Range    D Dimer Quantitative 0.61 (H) <0.57 mg/L (FEU)   Troponin I Ultra Sensitive   Result Value Ref Range    TROPONIN I <0.02 <0.05 ng/mL   NT proBNP   Result Value Ref Range    NT proBNP 495 (H) <451 pg/mL   Troponin I Ultra Sensitive   Result Value Ref Range    TROPONIN I <0.02 <0.05 ng/mL       EKG Results     EKG Interpretation A. fib with heart rate of 83.  There is no acute ST elevation or depression.  .    EKG tracing interpreted by ED physician    Radiology Results     Imaging Results          US VASC EXTREMITY LOWER VENOUS DUPLEX (Final result)  Result time 09/02/20 16:17:29    Final result                 Impression:    This examination is considered negative for acute deep venous thrombosis within the left lower extremity.      Study was reviewed and interpreted with Dr. DARREN López.     Jeanine Shaw MD,   Cardiology Fellow    Dictated by: JEANINE SHAW MD  Dictated on: 9/2/2020 1:37 PM     IJANETH M.D., have reviewed the images and report and concur with these findings interpreted by JEANINE SHAW MD.    Electronically Signed by: JANETH LÓPEZ M.D.   Signed on: 9/2/2020 4:17 PM                Narrative:    EXAM: Left Lower Extremity Venous Duplex Study    CLINICAL INDICATION:  Left lower extremity edema and pain.     PROCEDURE:  Routine protocol for real-time high resolution duplex imaging of the deep and superficial venous systems of the left lower extremity and the contralateral right common femoral vein is performed.  Gray scale imaging, color  flow Doppler and   spectral waveform analysis is performed.  This exam was performed by  Patricia Morgan RVT    FINDINGS: The left common femoral, proximal profunda femoris, femoral vein in the left thigh, left popliteal, left posterior tibial and peroneal veins are patent and compressible. No evidence of any acute DVT. The left Greater Saphenous vein in the   proximal thigh is patent.    The right common femoral vein is compressible and patent.                               XR CHEST PA OR AP 1 VIEW (Final result)  Result time 09/02/20 12:59:18    Final result                 Impression:    No acute cardiopulmonary process.  Mild cardiomegaly.    Electronically Signed by: ADAM RAYO M.D.   Signed on: 9/2/2020 12:59 PM                Narrative:    CHEST RADIOGRAPH    INDICATION: 80-year-old male with shortness of breath    COMPARISON STUDIES: Chest radiograph on 11/19/2015    TECHNIQUE:  Single frontal view of the chest was obtained.    FINDINGS:    The lungs are well-inflated, and free of consolidation.  Mild left basilar atelectasis is present.  There is no pleural effusion or pneumothorax.  The heart is mildly enlarged, with normal pulmonary vasculature.. There are mild atherosclerotic   calcifications.  There is no acute osseous abnormality.                                  ED Course as of Sep 02 1830   Wed Sep 02, 2020   1537 [facility] EXTREMITY LOWER VENOUS DUPLEX [EN]   1829 Patient was evaluated in the emergency department by his cardiologist Dr. Gordon Perkins.  His INR is therapeutic, d-dimer is negative for age-adjusted parameters and Doppler is negative while speaking against PE or acute DVT.  Troponin is negative x2.  BNP is slightly elevated and per discussion with cardiology question possible mild CHF.  Per recommendations he has been given 20 mg of IV Lasix in the ED and will plan on discharge home and he will be followed up in the cardiology office by Dr. Perkins tomorrow for outpatient echo and  possible stress.  Patient and his wife feel very comfortable with this plan.  Do verbalize understanding for need to return to the ED if any worsening of symptoms, chest pain, difficulty breathing, or any emergent concern.  Otherwise follow-up promptly with cardiology tomorrow as discussed.  Patient is ambulatory upon discharge without any oxygen requirement or increased work of breathing.    [EN]      ED Course User Index  [EN] Connie Camp MD         Clinical Impression     ED Diagnosis   1. Shortness of breath         Disposition        Discharge 9/2/2020  5:33 PM  Loco Barr discharge to home/self care.          Connie Camp MD   9/2/2020 11:56 AM                      Connie Camp MD  09/02/20 3177     no radiation

## 2021-10-06 NOTE — ASU PREOP CHECKLIST - VIA
Twelve-lead EKG obtained at 2025 on 5 October 2021 and interpreted by me in the absence of a cardiologist.  There is no criteria ST elevation or reciprocal change. There are no hyperacute T wave changes. There is no sign of acute ischemia or infarction. This tracing shows a normal sinus rhythm. Rate and intervals are 76 beats per minute, OH interval 176 milliseconds, QRS duration 84 milliseconds, QTc interval 418 milliseconds, and R axis normal at 23 degrees. There is no acute change compared with the most recent EKG dated May 7, 2021.         Sly Oliver MD  10/05/21 2050 stretcher

## 2022-01-11 PROBLEM — N20.0 CALCULUS OF KIDNEY: Chronic | Status: ACTIVE | Noted: 2018-05-18

## 2022-01-24 ENCOUNTER — APPOINTMENT (OUTPATIENT)
Dept: OTOLARYNGOLOGY | Facility: CLINIC | Age: 67
End: 2022-01-24
Payer: MEDICARE

## 2022-01-24 VITALS
DIASTOLIC BLOOD PRESSURE: 83 MMHG | BODY MASS INDEX: 22.2 KG/M2 | HEART RATE: 84 BPM | HEIGHT: 64 IN | WEIGHT: 130 LBS | SYSTOLIC BLOOD PRESSURE: 126 MMHG

## 2022-01-24 DIAGNOSIS — H93.292 OTHER ABNORMAL AUDITORY PERCEPTIONS, LEFT EAR: ICD-10-CM

## 2022-01-24 PROCEDURE — 99203 OFFICE O/P NEW LOW 30 MIN: CPT | Mod: 25

## 2022-01-24 PROCEDURE — 69210 REMOVE IMPACTED EAR WAX UNI: CPT

## 2022-01-24 NOTE — REVIEW OF SYSTEMS
[Nasal Congestion] : nasal congestion [Throat Clearing] : throat clearing [Negative] : Heme/Lymph [As Noted in HPI] : as noted in HPI [FreeTextEntry1] : headache

## 2022-01-24 NOTE — HISTORY OF PRESENT ILLNESS
[de-identified] : 66 yr old female with intermittent clog AS for a month\par -otalgia, tinnitus, dizzy\par +Qtips\par +childhood otitis\par -noise exp, head trauma\par +FH daughter

## 2022-01-24 NOTE — PHYSICAL EXAM
[de-identified] : CI AS [Normal] : mucosa is normal [Midline] : trachea located in midline position

## 2022-05-17 ENCOUNTER — NON-APPOINTMENT (OUTPATIENT)
Age: 67
End: 2022-05-17

## 2022-05-17 ENCOUNTER — APPOINTMENT (OUTPATIENT)
Dept: THORACIC SURGERY | Facility: CLINIC | Age: 67
End: 2022-05-17
Payer: MEDICARE

## 2022-05-17 VITALS
WEIGHT: 130 LBS | SYSTOLIC BLOOD PRESSURE: 146 MMHG | BODY MASS INDEX: 22.2 KG/M2 | OXYGEN SATURATION: 90 % | HEART RATE: 107 BPM | HEIGHT: 64 IN | DIASTOLIC BLOOD PRESSURE: 88 MMHG

## 2022-05-17 DIAGNOSIS — Z01.818 ENCOUNTER FOR OTHER PREPROCEDURAL EXAMINATION: ICD-10-CM

## 2022-05-17 PROCEDURE — 99205 OFFICE O/P NEW HI 60 MIN: CPT

## 2022-05-17 RX ORDER — FLUTICASONE FUROATE, UMECLIDINIUM BROMIDE AND VILANTEROL TRIFENATATE 100; 62.5; 25 UG/1; UG/1; UG/1
100-62.5-25 POWDER RESPIRATORY (INHALATION)
Qty: 180 | Refills: 0 | Status: DISCONTINUED | COMMUNITY
Start: 2022-01-04 | End: 2022-05-17

## 2022-05-17 NOTE — PHYSICAL EXAM
[Fully active, able to carry on all pre-disease performance without restriction] : Status 0 - Fully active, able to carry on all pre-disease performance without restriction [General Appearance - In No Acute Distress] : in no acute distress [Sclera] : the sclera and conjunctiva were normal [Outer Ear] : the ears and nose were normal in appearance [] : no respiratory distress [Respiration, Rhythm And Depth] : normal respiratory rhythm and effort [Auscultation Breath Sounds / Voice Sounds] : lungs were clear to auscultation bilaterally [Heart Sounds] : normal S1 and S2 [Examination Of The Chest] : the chest was normal in appearance [2+] : left 2+ [Breast Appearance] : normal in appearance [Abdomen Soft] : soft [Cervical Lymph Nodes Enlarged Posterior Bilaterally] : posterior cervical [Cervical Lymph Nodes Enlarged Anterior Bilaterally] : anterior cervical [No CVA Tenderness] : no ~M costovertebral angle tenderness [Abnormal Walk] : normal gait [Skin Color & Pigmentation] : normal skin color and pigmentation [No Focal Deficits] : no focal deficits [Oriented To Time, Place, And Person] : oriented to person, place, and time [FreeTextEntry1] : Deferred

## 2022-05-17 NOTE — HISTORY OF PRESENT ILLNESS
[FreeTextEntry1] : Ms. DEMI CHACKO, 67 year old female, former smoker (30PY, quit 2009), w/ hx of COPD (improved on trelegy but no longer taking bc too expensive), kidney stones, and RSV bronchiolitis, who presented for routine visit with pulm Dr. Urbina. Further CT Chest 1/2022 revealed new TRISTAN lung nodule, repeat scan revealing increase in size with PET avidity.   \par \par PFT on 1/10/22:\par - FVC 2.81 L (110%), FEV1 1.43L (71%), DLCO 1.69L (40%)\par \par CT Chest on 5/5/22:\par - Mild increase in size in new nodular lesion in TRISTAN described previously (3:51). Measures about 9.4 x 8 mm versus 8 x 6 mm previously\par \par PET/CT on 5/11/22:\par  - FDG avid 0.8 cm TRISTAN pulmonary nodule, SUV 2.0 (3:81)\par \par Patient is here today for CT Sx consultation, referred by Dr. Joseluis Urbina (PULM).

## 2022-05-17 NOTE — CONSULT LETTER
[Dear  ___] : Dear  [unfilled], [Consult Letter:] : I had the pleasure of evaluating your patient, [unfilled]. [( Thank you for referring [unfilled] for consultation for _____ )] : Thank you for referring [unfilled] for consultation for [unfilled] [Please see my note below.] : Please see my note below. [Consult Closing:] : Thank you very much for allowing me to participate in the care of this patient.  If you have any questions, please do not hesitate to contact me. [Sincerely,] : Sincerely, [FreeTextEntry2] : Dr. Joseluis Urbina (PULM/ref) [FreeTextEntry3] : Nikita Villegas MD, FACS \par Chief, Division of Thoracic Surgery \par Director, Minimally Invasive Thoracic Surgery \par Department of Cardiovascular and Thoracic Surgery \par [facility] \par , Cardiovascular and Thoracic Surgery\par

## 2022-05-17 NOTE — ASSESSMENT
[FreeTextEntry1] : Ms. DEMI CHACKO, 67 year old female, former smoker (30PY, quit 2009), w/ hx of COPD (improved on trelegy but no longer taking bc too expensive), kidney stones, and RSV bronchiolitis, who presented for routine visit with pulm Dr. Urbina. Further CT Chest 1/2022 revealed new TRISTAN lung nodule, repeat scan revealing increase in size with PET avidity.   \par \par PFT on 1/10/22:\par - FVC 2.81 L (110%), FEV1 1.43L (71%), DLCO 1.69L (40%)\par \par CT Chest on 5/5/22:\par - Mild increase in size in new nodular lesion in TRISTAN described previously (3:51). Measures about 9.4 x 8 mm versus 8 x 6 mm previously\par \par PET/CT on 5/11/22:\par  - FDG avid 0.8 cm TRISTAN pulmonary nodule, SUV 2.0 (3:81)\par \par I have independently reviewed the patient's medical records and diagnostic images at time of this office consultation and have made the following recommendation:\par 1. PET/CT reveals FDG avid enlarging TRISTAN pulmonary nodule. I recommend she undergo Flexibile Bronchoscopy Left VATS Left Upper Lobectomy possible Lingular Sparing. \par 2. Cardiac Clearance\par \par I personally performed the services described in the documentation, reviewed the documentation recorded by the scribe in my presence and it accurately and completely records my words and actions.\par \par I, RAYNA Adkins, am scribing for and in the presence of STEVAN Baocn, the following sections HISTORY OF PRESENT ILLNESS, PAST MEDICAL/FAMILY/SOCIAL HISTORY; REVIEW OF SYSTEMS; VITAL SIGNS; PHYSICAL EXAM; DISPOSITION.\par  \par   Consent 1/Introductory Paragraph: The rationale for Mohs was explained to the patient and consent was obtained. The risks, benefits and alternatives to therapy were discussed in detail. Specifically, the risks of infection, scarring, bleeding, prolonged wound healing, incomplete removal, allergy to anesthesia, nerve injury and recurrence were addressed. Prior to the procedure, the treatment site was clearly identified and confirmed by the patient. All components of Universal Protocol/PAUSE Rule completed.

## 2022-05-19 ENCOUNTER — OUTPATIENT (OUTPATIENT)
Dept: OUTPATIENT SERVICES | Facility: HOSPITAL | Age: 67
LOS: 1 days | End: 2022-05-19
Payer: MEDICARE

## 2022-05-19 VITALS
DIASTOLIC BLOOD PRESSURE: 80 MMHG | SYSTOLIC BLOOD PRESSURE: 130 MMHG | WEIGHT: 132.06 LBS | HEART RATE: 90 BPM | OXYGEN SATURATION: 99 % | RESPIRATION RATE: 16 BRPM | HEIGHT: 62.5 IN | TEMPERATURE: 97 F

## 2022-05-19 DIAGNOSIS — Z87.09 PERSONAL HISTORY OF OTHER DISEASES OF THE RESPIRATORY SYSTEM: ICD-10-CM

## 2022-05-19 DIAGNOSIS — R91.1 SOLITARY PULMONARY NODULE: ICD-10-CM

## 2022-05-19 DIAGNOSIS — Z41.9 ENCOUNTER FOR PROCEDURE FOR PURPOSES OTHER THAN REMEDYING HEALTH STATE, UNSPECIFIED: Chronic | ICD-10-CM

## 2022-05-19 DIAGNOSIS — Z98.89 OTHER SPECIFIED POSTPROCEDURAL STATES: Chronic | ICD-10-CM

## 2022-05-19 LAB
ANION GAP SERPL CALC-SCNC: 14 MMOL/L — SIGNIFICANT CHANGE UP (ref 7–14)
BLD GP AB SCN SERPL QL: NEGATIVE — SIGNIFICANT CHANGE UP
BUN SERPL-MCNC: 16 MG/DL — SIGNIFICANT CHANGE UP (ref 7–23)
CALCIUM SERPL-MCNC: 10.1 MG/DL — SIGNIFICANT CHANGE UP (ref 8.4–10.5)
CHLORIDE SERPL-SCNC: 104 MMOL/L — SIGNIFICANT CHANGE UP (ref 98–107)
CO2 SERPL-SCNC: 25 MMOL/L — SIGNIFICANT CHANGE UP (ref 22–31)
CREAT SERPL-MCNC: 0.61 MG/DL — SIGNIFICANT CHANGE UP (ref 0.5–1.3)
EGFR: 99 ML/MIN/1.73M2 — SIGNIFICANT CHANGE UP
GLUCOSE SERPL-MCNC: 115 MG/DL — HIGH (ref 70–99)
HCT VFR BLD CALC: 46.9 % — HIGH (ref 34.5–45)
HGB BLD-MCNC: 15.4 G/DL — SIGNIFICANT CHANGE UP (ref 11.5–15.5)
MCHC RBC-ENTMCNC: 30 PG — SIGNIFICANT CHANGE UP (ref 27–34)
MCHC RBC-ENTMCNC: 32.8 GM/DL — SIGNIFICANT CHANGE UP (ref 32–36)
MCV RBC AUTO: 91.4 FL — SIGNIFICANT CHANGE UP (ref 80–100)
NRBC # BLD: 0 /100 WBCS — SIGNIFICANT CHANGE UP
NRBC # FLD: 0 K/UL — SIGNIFICANT CHANGE UP
PLATELET # BLD AUTO: 250 K/UL — SIGNIFICANT CHANGE UP (ref 150–400)
POTASSIUM SERPL-MCNC: 3.9 MMOL/L — SIGNIFICANT CHANGE UP (ref 3.5–5.3)
POTASSIUM SERPL-SCNC: 3.9 MMOL/L — SIGNIFICANT CHANGE UP (ref 3.5–5.3)
RBC # BLD: 5.13 M/UL — SIGNIFICANT CHANGE UP (ref 3.8–5.2)
RBC # FLD: 12 % — SIGNIFICANT CHANGE UP (ref 10.3–14.5)
RH IG SCN BLD-IMP: NEGATIVE — SIGNIFICANT CHANGE UP
SODIUM SERPL-SCNC: 143 MMOL/L — SIGNIFICANT CHANGE UP (ref 135–145)
WBC # BLD: 8.6 K/UL — SIGNIFICANT CHANGE UP (ref 3.8–10.5)
WBC # FLD AUTO: 8.6 K/UL — SIGNIFICANT CHANGE UP (ref 3.8–10.5)

## 2022-05-19 PROCEDURE — 93010 ELECTROCARDIOGRAM REPORT: CPT

## 2022-05-19 RX ORDER — SODIUM CHLORIDE 9 MG/ML
1000 INJECTION, SOLUTION INTRAVENOUS
Refills: 0 | Status: DISCONTINUED | OUTPATIENT
Start: 2022-05-25 | End: 2022-05-27

## 2022-05-19 NOTE — H&P PST ADULT - HISTORY OF PRESENT ILLNESS
Pt is a 67 yr old female scheduled for Flexible Bronchoscopy Left Video Assisted Thoracoscopy Left Upper Lobectomy Poss Lingular Sparing with Dr Villegas tentatively 5/25/22 - pt former smoker and had CT scan where pulmonary nodule was noted - pt denies SOB , cough or pain   Patient instructed to contact surgeon's office concerning COVID test prior to surgery    Pt is a 67 yr old female scheduled for Flexible Bronchoscopy Left Video Assisted Thoracoscopy Left Upper Lobectomy Poss Lingular Sparing with Dr Villegas tentatively 5/25/22 - pt former smoker and had CT scan where pulmonary nodule was noted - pt denies pain or cough - some mild SOB with exertion   Patient instructed to contact surgeon's office concerning COVID test prior to surgery

## 2022-05-19 NOTE — H&P PST ADULT - NSICDXPASTMEDICALHX_GEN_ALL_CORE_FT
PAST MEDICAL HISTORY:  Emphysema lung     Renal calculi     Shingles      PAST MEDICAL HISTORY:  Emphysema lung     Former smoker     Pulmonary nodule     Renal calculi     Shingles

## 2022-05-19 NOTE — H&P PST ADULT - PROBLEM SELECTOR PLAN 1
Pt scheduled for surgery Flexible Bronchoscopy Left Video Assisted Thoracoscopy Left Upper Lobectomy Poss Lingular Sparing with Dr Villegas tentatively 5/25/22  and preop instructions including instructions for taking Famotidine and for Chlorhexidine use in showering on the day of surgery, given verbally and with use of  written materials, and patient confirming understanding of such instructions using  teach back method.  Pt to see Cardio for cardiac evaluation - will request copy of clearance with Echo - surgery is high risk and pt c/o of mild exertional SOB

## 2022-05-19 NOTE — H&P PST ADULT - FALL HARM RISK - PT AGE POPULATION HIDDEN
Caller: Wanda Victoria    Relationship: Self    Best call back number: [phone]    What is the best time to reach you:ANYTIME    Who are you requesting to speak with (clinical staff, provider,  specific staff member):CLINICAL STAFF    Do you know the name of the person who called: NA    What was the call regarding:PT CALLED AND STATES THAT SHE IS NOT IMPROVING AT ALL IN PHYSICAL THERAPY-THERE ARE NOTES FROM PHYSICAL THERAPY IN THE CHART-PT STATES THAT SHE IS NOT FEELING ANY BETTER-PT IS COMPLAINING OF HER LEFT LEG GIVING OUT ON HER-SHE STATES THE THE THERAPIST STATED THEY DID NOT NOTICE ANY DIFFERENCE IN HER LEFT LEG WEAKNESS FROM BEFORE STARTING THERAPY-PT IS ASKING IF SHE SHOULD HAVE ANY IMAGING TO SEE IF ANYTHING IS SHOWING UPPLEASE ADVISE IF PT CAN BE SOON SOONER HER NEXT APPT. IS IN November THANK YOU    Do you require a callback:YES           Adult

## 2022-05-19 NOTE — H&P PST ADULT - NSICDXPASTSURGICALHX_GEN_ALL_CORE_FT
PAST SURGICAL HISTORY:  Elective surgery hernia repair 2014    History of laparoscopic cholecystectomy 1995    S/P cystoscopy with right renal stent insertion 5/8/18     PAST SURGICAL HISTORY:  Elective surgery hernia repair 2014 - with mesh    History of laparoscopic cholecystectomy 1995    S/P cystoscopy with right renal stent insertion 5/8/18

## 2022-05-24 LAB — SARS-COV-2 N GENE NPH QL NAA+PROBE: NOT DETECTED

## 2022-05-24 NOTE — ASU PATIENT PROFILE, ADULT - FALL HARM RISK - UNIVERSAL INTERVENTIONS
Bed in lowest position, wheels locked, appropriate side rails in place/Call bell, personal items and telephone in reach/Instruct patient to call for assistance before getting out of bed or chair/Non-slip footwear when patient is out of bed/[place] to call system/Physically safe environment - no spills, clutter or unnecessary equipment/Purposeful Proactive Rounding/Room/bathroom lighting operational, light cord in reach

## 2022-05-24 NOTE — ASU PATIENT PROFILE, ADULT - NSICDXPASTSURGICALHX_GEN_ALL_CORE_FT
PAST SURGICAL HISTORY:  Elective surgery hernia repair 2014 - with mesh    History of laparoscopic cholecystectomy 1995    S/P cystoscopy with right renal stent insertion 5/8/18

## 2022-05-25 ENCOUNTER — APPOINTMENT (OUTPATIENT)
Dept: THORACIC SURGERY | Facility: HOSPITAL | Age: 67
End: 2022-05-25

## 2022-05-25 ENCOUNTER — RESULT REVIEW (OUTPATIENT)
Age: 67
End: 2022-05-25

## 2022-05-25 ENCOUNTER — INPATIENT (INPATIENT)
Facility: HOSPITAL | Age: 67
LOS: 13 days | Discharge: HOME | End: 2022-06-08
Attending: THORACIC SURGERY (CARDIOTHORACIC VASCULAR SURGERY) | Admitting: THORACIC SURGERY (CARDIOTHORACIC VASCULAR SURGERY)
Payer: MEDICARE

## 2022-05-25 VITALS
OXYGEN SATURATION: 95 % | HEIGHT: 62.5 IN | WEIGHT: 132.06 LBS | DIASTOLIC BLOOD PRESSURE: 92 MMHG | TEMPERATURE: 98 F | SYSTOLIC BLOOD PRESSURE: 125 MMHG | HEART RATE: 85 BPM | RESPIRATION RATE: 16 BRPM

## 2022-05-25 DIAGNOSIS — Z41.9 ENCOUNTER FOR PROCEDURE FOR PURPOSES OTHER THAN REMEDYING HEALTH STATE, UNSPECIFIED: Chronic | ICD-10-CM

## 2022-05-25 DIAGNOSIS — Z98.89 OTHER SPECIFIED POSTPROCEDURAL STATES: Chronic | ICD-10-CM

## 2022-05-25 DIAGNOSIS — R91.1 SOLITARY PULMONARY NODULE: ICD-10-CM

## 2022-05-25 LAB — RH IG SCN BLD-IMP: NEGATIVE — SIGNIFICANT CHANGE UP

## 2022-05-25 PROCEDURE — 88305 TISSUE EXAM BY PATHOLOGIST: CPT | Mod: 26

## 2022-05-25 PROCEDURE — 32674 THORACOSCOPY LYMPH NODE EXC: CPT | Mod: AS

## 2022-05-25 PROCEDURE — 88309 TISSUE EXAM BY PATHOLOGIST: CPT | Mod: 26

## 2022-05-25 PROCEDURE — 71045 X-RAY EXAM CHEST 1 VIEW: CPT | Mod: 26

## 2022-05-25 PROCEDURE — 32663 THORACOSCOPY W/LOBECTOMY: CPT | Mod: AS,LT

## 2022-05-25 PROCEDURE — 99233 SBSQ HOSP IP/OBS HIGH 50: CPT

## 2022-05-25 PROCEDURE — 88333 PATH CONSLTJ SURG CYTO XM 1: CPT | Mod: 26

## 2022-05-25 PROCEDURE — 32674 THORACOSCOPY LYMPH NODE EXC: CPT

## 2022-05-25 PROCEDURE — 32663 THORACOSCOPY W/LOBECTOMY: CPT | Mod: LT

## 2022-05-25 DEVICE — STAPLER ETHICON GST ECHELON 45MM GOLD RELOAD: Type: IMPLANTABLE DEVICE | Site: LEFT | Status: FUNCTIONAL

## 2022-05-25 DEVICE — CLIP APPLIER COVIDIEN ENDOCLIP III 5MM: Type: IMPLANTABLE DEVICE | Site: LEFT | Status: FUNCTIONAL

## 2022-05-25 DEVICE — STAPLER ETHICON ECHELON ENDOPATH VASCULAR 35MM WHITE RELOAD: Type: IMPLANTABLE DEVICE | Site: LEFT | Status: FUNCTIONAL

## 2022-05-25 DEVICE — IMPLANTABLE DEVICE: Type: IMPLANTABLE DEVICE | Site: LEFT | Status: FUNCTIONAL

## 2022-05-25 DEVICE — STAPLER ETHICON GST ECHELON 45MM GREEN RELOAD: Type: IMPLANTABLE DEVICE | Site: LEFT | Status: FUNCTIONAL

## 2022-05-25 DEVICE — CHEST DRAIN THORACIC ARGYLE PVC 20FR STRAIGHT: Type: IMPLANTABLE DEVICE | Site: LEFT | Status: FUNCTIONAL

## 2022-05-25 RX ORDER — ONDANSETRON 8 MG/1
4 TABLET, FILM COATED ORAL EVERY 6 HOURS
Refills: 0 | Status: DISCONTINUED | OUTPATIENT
Start: 2022-05-25 | End: 2022-06-08

## 2022-05-25 RX ORDER — HEPARIN SODIUM 5000 [USP'U]/ML
5000 INJECTION INTRAVENOUS; SUBCUTANEOUS EVERY 8 HOURS
Refills: 0 | Status: DISCONTINUED | OUTPATIENT
Start: 2022-05-25 | End: 2022-06-08

## 2022-05-25 RX ORDER — DIPHENHYDRAMINE HCL 50 MG
50 CAPSULE ORAL EVERY 4 HOURS
Refills: 0 | Status: DISCONTINUED | OUTPATIENT
Start: 2022-05-25 | End: 2022-05-26

## 2022-05-25 RX ORDER — DORNASE ALFA 1 MG/ML
2.5 SOLUTION RESPIRATORY (INHALATION) DAILY
Refills: 0 | Status: DISCONTINUED | OUTPATIENT
Start: 2022-05-26 | End: 2022-06-07

## 2022-05-25 RX ORDER — IPRATROPIUM BROMIDE 0.2 MG/ML
500 SOLUTION, NON-ORAL INHALATION EVERY 6 HOURS
Refills: 0 | Status: DISCONTINUED | OUTPATIENT
Start: 2022-05-25 | End: 2022-05-25

## 2022-05-25 RX ORDER — TIOTROPIUM BROMIDE 18 UG/1
1 CAPSULE ORAL; RESPIRATORY (INHALATION) AT BEDTIME
Refills: 0 | Status: DISCONTINUED | OUTPATIENT
Start: 2022-05-25 | End: 2022-05-28

## 2022-05-25 RX ORDER — ACETAMINOPHEN 500 MG
900 TABLET ORAL ONCE
Refills: 0 | Status: COMPLETED | OUTPATIENT
Start: 2022-05-25 | End: 2022-05-25

## 2022-05-25 RX ORDER — HEPARIN SODIUM 5000 [USP'U]/ML
5000 INJECTION INTRAVENOUS; SUBCUTANEOUS ONCE
Refills: 0 | Status: COMPLETED | OUTPATIENT
Start: 2022-05-25 | End: 2022-05-25

## 2022-05-25 RX ORDER — BUDESONIDE AND FORMOTEROL FUMARATE DIHYDRATE 160; 4.5 UG/1; UG/1
2 AEROSOL RESPIRATORY (INHALATION)
Refills: 0 | Status: DISCONTINUED | OUTPATIENT
Start: 2022-05-25 | End: 2022-05-28

## 2022-05-25 RX ORDER — ACETAMINOPHEN 500 MG
900 TABLET ORAL ONCE
Refills: 0 | Status: DISCONTINUED | OUTPATIENT
Start: 2022-05-25 | End: 2022-06-08

## 2022-05-25 RX ORDER — FAMOTIDINE 10 MG/ML
20 INJECTION INTRAVENOUS EVERY 12 HOURS
Refills: 0 | Status: DISCONTINUED | OUTPATIENT
Start: 2022-05-25 | End: 2022-05-25

## 2022-05-25 RX ORDER — SODIUM CHLORIDE 9 MG/ML
4 INJECTION INTRAMUSCULAR; INTRAVENOUS; SUBCUTANEOUS EVERY 6 HOURS
Refills: 0 | Status: DISCONTINUED | OUTPATIENT
Start: 2022-05-25 | End: 2022-06-08

## 2022-05-25 RX ORDER — GABAPENTIN 400 MG/1
100 CAPSULE ORAL ONCE
Refills: 0 | Status: COMPLETED | OUTPATIENT
Start: 2022-05-25 | End: 2022-05-25

## 2022-05-25 RX ORDER — HYDROMORPHONE HYDROCHLORIDE 2 MG/ML
30 INJECTION INTRAMUSCULAR; INTRAVENOUS; SUBCUTANEOUS
Refills: 0 | Status: DISCONTINUED | OUTPATIENT
Start: 2022-05-25 | End: 2022-05-26

## 2022-05-25 RX ORDER — HYDROMORPHONE HYDROCHLORIDE 2 MG/ML
0.5 INJECTION INTRAMUSCULAR; INTRAVENOUS; SUBCUTANEOUS
Refills: 0 | Status: DISCONTINUED | OUTPATIENT
Start: 2022-05-25 | End: 2022-05-26

## 2022-05-25 RX ORDER — ALBUTEROL 90 UG/1
2.5 AEROSOL, METERED ORAL EVERY 6 HOURS
Refills: 0 | Status: DISCONTINUED | OUTPATIENT
Start: 2022-05-25 | End: 2022-05-25

## 2022-05-25 RX ORDER — ALBUTEROL 90 UG/1
2.5 AEROSOL, METERED ORAL EVERY 6 HOURS
Refills: 0 | Status: DISCONTINUED | OUTPATIENT
Start: 2022-05-25 | End: 2022-05-26

## 2022-05-25 RX ORDER — NALOXONE HYDROCHLORIDE 4 MG/.1ML
0.1 SPRAY NASAL
Refills: 0 | Status: DISCONTINUED | OUTPATIENT
Start: 2022-05-25 | End: 2022-06-08

## 2022-05-25 RX ORDER — ACETAMINOPHEN 500 MG
975 TABLET ORAL ONCE
Refills: 0 | Status: COMPLETED | OUTPATIENT
Start: 2022-05-25 | End: 2022-05-25

## 2022-05-25 RX ORDER — SENNA PLUS 8.6 MG/1
2 TABLET ORAL AT BEDTIME
Refills: 0 | Status: DISCONTINUED | OUTPATIENT
Start: 2022-05-25 | End: 2022-06-08

## 2022-05-25 RX ADMIN — HEPARIN SODIUM 5000 UNIT(S): 5000 INJECTION INTRAVENOUS; SUBCUTANEOUS at 07:27

## 2022-05-25 RX ADMIN — HEPARIN SODIUM 5000 UNIT(S): 5000 INJECTION INTRAVENOUS; SUBCUTANEOUS at 23:22

## 2022-05-25 RX ADMIN — HEPARIN SODIUM 5000 UNIT(S): 5000 INJECTION INTRAVENOUS; SUBCUTANEOUS at 15:52

## 2022-05-25 RX ADMIN — Medication 900 MILLIGRAM(S): at 16:45

## 2022-05-25 RX ADMIN — SODIUM CHLORIDE 4 MILLILITER(S): 9 INJECTION INTRAMUSCULAR; INTRAVENOUS; SUBCUTANEOUS at 22:46

## 2022-05-25 RX ADMIN — SENNA PLUS 2 TABLET(S): 8.6 TABLET ORAL at 23:21

## 2022-05-25 RX ADMIN — Medication 360 MILLIGRAM(S): at 16:26

## 2022-05-25 RX ADMIN — GABAPENTIN 100 MILLIGRAM(S): 400 CAPSULE ORAL at 07:28

## 2022-05-25 RX ADMIN — SODIUM CHLORIDE 30 MILLILITER(S): 9 INJECTION, SOLUTION INTRAVENOUS at 20:15

## 2022-05-25 RX ADMIN — ALBUTEROL 2.5 MILLIGRAM(S): 90 AEROSOL, METERED ORAL at 22:45

## 2022-05-25 RX ADMIN — Medication 975 MILLIGRAM(S): at 07:28

## 2022-05-25 RX ADMIN — ONDANSETRON 4 MILLIGRAM(S): 8 TABLET, FILM COATED ORAL at 15:52

## 2022-05-25 NOTE — PROGRESS NOTE ADULT - SUBJECTIVE AND OBJECTIVE BOX
CHIEF COMPLAINT: FOLLOW UP IN ICU FOR POSTOPERATIVE CARE OF PATIENT WHO IS S/P LUNG RESECTION      PROCEDURES: Uniportal Left VATS TRISTAN Lobectomy 5/25/2022      ISSUES:   Lung nodule  Post op pain  Chest tube in place  COPD  Hx of kidney stones s/p R ureter stent (5/2018)   Hx of shingles  Former smoker    INTERVAL EVENTS:   OR today. Extubated in OR. Transferred to CTICU.      HISTORY:   Patient reports moderate pain at chest wall incision sites which is worse with coughing and deep breathing without associated fever or dyspnea. Pain is improved with use of pain meds.     PHYSICAL EXAM:   Gen: Comfortable, No acute distress  Eyes: Sclera white, Conjunctiva normal, Eyelids normal, Pupils symmetrical   ENT: Mucous membranes moist,  ,  ,    Neck: Trachea midline,  ,  ,  ,  ,  ,    CV: Rate regular, Rhythm regular,  ,  ,    Resp: Breath sounds clear, No accessory muscles use, L chest tube in place,  ,    Abd: Soft, Non-distended, Non-tender, Bowel sounds normal,  ,  ,    Skin: Warm, No peripheral edema of lower extremities,  ,    : No newell  Neuro: Moving all 4 extremities,    Psych: A&Ox3      ASSESSMENT AND PLAN:     NEURO:  Post-operative Pain - Stable. Pain control with PCA and Tylenol IV PRN.       RESPIRATORY:  Hypoxia - Wean nasal cannula for goal O2sat above 92. Obtain CXR. Incentive spirometry. Chest PT and frequent suctioning. Continue bronchodilators. OOB to chair & ambulate w/ assistance. Continuous pulse oximetry for support & to prevent decompensation.       Chest tube – Pleurevac regulated suctioning. Monitor chest tube output.    COPD - worsened by recent thoracic surgery. Continue home inhalers.          CARDIOVASCULAR:  Hemodynamically stable - Not on pressors. Continue hemodynamic monitoring.  Telemetry (medical test) - Reviewed by me today independently. Normal sinus rhythm.     RENAL:  Stable - Monitor IOs and electrolytes. Keep K above 4.0 and Mg above 2.0.        GASTROINTESTINAL:  GI prophylaxis not indicated  Zofran and Reglan IV PRN for nausea  Regular consistency diet       HEMATOLOGIC:  No signs of active bleeding. Monitor Hgb in CBC in AM  DVT prophylaxis with heparin subQ and SCDs.               INFECTIOUS DISEASE:  All surgical sites appear clean. Will monitor for fever and leukocytosis.            ENDOCRINE:  Stable – Monitor glucose fingersticks for goal 120-180.               ONCOLOGY:  Lung nodule - Improved. S/P resection. Follow up final pathology.      Pertinent clinical, laboratory, radiographic, hemodynamic, echocardiographic, respiratory data, microbiologic data and chart were reviewed by myself and analyzed frequently throughout the course of the day and night by myself.    Plan discussed at length with the CTICU staff and Attending CT Surgeon -   Dr Nikita Villegas.      Patient's status was discussed with patient at bedside.     	      ________________________________________________      _________________________  VITAL SIGNS:  Vital Signs Last 24 Hrs  T(C): 36.6 (25 May 2022 10:40), Max: 36.8 (25 May 2022 06:38)  T(F): 97.8 (25 May 2022 10:40), Max: 98.3 (25 May 2022 06:38)  HR: 81 (25 May 2022 14:00) (73 - 85)  BP: 101/69 (25 May 2022 14:00) (101/66 - 125/92)  BP(mean): 81 (25 May 2022 14:00) (78 - 92)  RR: 12 (25 May 2022 14:00) (10 - 23)  SpO2: 95% (25 May 2022 14:00) (95% - 100%)  I/Os:   I&O's Detail    25 May 2022 07:01  -  25 May 2022 14:44  --------------------------------------------------------  IN:    Lactated Ringers: 120 mL  Total IN: 120 mL    OUT:    Chest Tube (mL): 0 mL  Total OUT: 0 mL    Total NET: 120 mL              MEDICATIONS:  MEDICATIONS  (STANDING):  acetaminophen   IVPB .. 900 milliGRAM(s) IV Intermittent once  ALBUTerol    0.083% 2.5 milliGRAM(s) Nebulizer every 6 hours  budesonide 160 MICROgram(s)/formoterol 4.5 MICROgram(s) Inhaler 2 Puff(s) Inhalation two times a day  heparin   Injectable 5000 Unit(s) SubCutaneous every 8 hours  HYDROmorphone PCA (1 mG/mL) 30 milliLiter(s) PCA Continuous PCA Continuous  lactated ringers. 1000 milliLiter(s) (30 mL/Hr) IV Continuous <Continuous>  senna 2 Tablet(s) Oral at bedtime  sodium chloride 3%  Inhalation 4 milliLiter(s) Inhalation every 6 hours  tiotropium 18 MICROgram(s) Capsule 1 Capsule(s) Inhalation at bedtime    MEDICATIONS  (PRN):  diphenhydrAMINE Injectable 50 milliGRAM(s) IV Push every 4 hours PRN Pruritus  HYDROmorphone PCA (1 mG/mL) Rescue Clinician Bolus 0.5 milliGRAM(s) IV Push every 15 minutes PRN for Pain Scale GREATER THAN 6  naloxone Injectable 0.1 milliGRAM(s) IV Push every 3 minutes PRN For ANY of the following changes in patient status:  A. RR LESS THAN 10 breaths per minute, B. Oxygen saturation LESS THAN 90%, C. Sedation score of 6  ondansetron Injectable 4 milliGRAM(s) IV Push every 6 hours PRN Nausea      LABS:  Pre op Laboratory data was independently reviewed by me today.     5/19/2022 - Hgb 15.4, Cr 0.61                  RADIOLOGY:   Radiology images were independently reviewed by me today. Reports were reviewed by me today.    Post op CXR 5/25/2022 - L Pneumothorax. Lung fields clear. Chest tube in place
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
## 2022-05-25 NOTE — BRIEF OPERATIVE NOTE - NSICDXBRIEFPROCEDURE_GEN_ALL_CORE_FT
PROCEDURES:  VATS, with lobectomy 25-May-2022 11:01:51 Uniportal Left VATS Lionel Ye  Biopsy, lymph node, mediastinum, thoracoscopic 25-May-2022 11:02:26  Lionel Ye
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
## 2022-05-25 NOTE — PATIENT PROFILE ADULT - FALL HARM RISK - UNIVERSAL INTERVENTIONS
Bed in lowest position, wheels locked, appropriate side rails in place/Call bell, personal items and telephone in reach/Instruct patient to call for assistance before getting out of bed or chair/Non-slip footwear when patient is out of bed/[place] to call system/Physically safe environment - no spills, clutter or unnecessary equipment/Purposeful Proactive Rounding/Room/bathroom lighting operational, light cord in reach

## 2022-05-26 LAB
ANION GAP SERPL CALC-SCNC: 11 MMOL/L — SIGNIFICANT CHANGE UP (ref 7–14)
BUN SERPL-MCNC: 12 MG/DL — SIGNIFICANT CHANGE UP (ref 7–23)
CALCIUM SERPL-MCNC: 9.4 MG/DL — SIGNIFICANT CHANGE UP (ref 8.4–10.5)
CHLORIDE SERPL-SCNC: 102 MMOL/L — SIGNIFICANT CHANGE UP (ref 98–107)
CO2 SERPL-SCNC: 26 MMOL/L — SIGNIFICANT CHANGE UP (ref 22–31)
CREAT SERPL-MCNC: 0.52 MG/DL — SIGNIFICANT CHANGE UP (ref 0.5–1.3)
EGFR: 102 ML/MIN/1.73M2 — SIGNIFICANT CHANGE UP
GLUCOSE SERPL-MCNC: 111 MG/DL — HIGH (ref 70–99)
HCT VFR BLD CALC: 45 % — SIGNIFICANT CHANGE UP (ref 34.5–45)
HGB BLD-MCNC: 14.1 G/DL — SIGNIFICANT CHANGE UP (ref 11.5–15.5)
MAGNESIUM SERPL-MCNC: 2.1 MG/DL — SIGNIFICANT CHANGE UP (ref 1.6–2.6)
MCHC RBC-ENTMCNC: 29.9 PG — SIGNIFICANT CHANGE UP (ref 27–34)
MCHC RBC-ENTMCNC: 31.3 GM/DL — LOW (ref 32–36)
MCV RBC AUTO: 95.3 FL — SIGNIFICANT CHANGE UP (ref 80–100)
NRBC # BLD: 0 /100 WBCS — SIGNIFICANT CHANGE UP
NRBC # FLD: 0 K/UL — SIGNIFICANT CHANGE UP
PHOSPHATE SERPL-MCNC: 3.7 MG/DL — SIGNIFICANT CHANGE UP (ref 2.5–4.5)
PLATELET # BLD AUTO: 229 K/UL — SIGNIFICANT CHANGE UP (ref 150–400)
POTASSIUM SERPL-MCNC: 4.4 MMOL/L — SIGNIFICANT CHANGE UP (ref 3.5–5.3)
POTASSIUM SERPL-SCNC: 4.4 MMOL/L — SIGNIFICANT CHANGE UP (ref 3.5–5.3)
RBC # BLD: 4.72 M/UL — SIGNIFICANT CHANGE UP (ref 3.8–5.2)
RBC # FLD: 11.8 % — SIGNIFICANT CHANGE UP (ref 10.3–14.5)
SODIUM SERPL-SCNC: 139 MMOL/L — SIGNIFICANT CHANGE UP (ref 135–145)
WBC # BLD: 13.58 K/UL — HIGH (ref 3.8–10.5)
WBC # FLD AUTO: 13.58 K/UL — HIGH (ref 3.8–10.5)

## 2022-05-26 PROCEDURE — 71045 X-RAY EXAM CHEST 1 VIEW: CPT | Mod: 26,76

## 2022-05-26 PROCEDURE — 99233 SBSQ HOSP IP/OBS HIGH 50: CPT

## 2022-05-26 RX ORDER — ACETAMINOPHEN 500 MG
650 TABLET ORAL EVERY 6 HOURS
Refills: 0 | Status: DISCONTINUED | OUTPATIENT
Start: 2022-05-26 | End: 2022-06-08

## 2022-05-26 RX ORDER — ACETAMINOPHEN 500 MG
900 TABLET ORAL ONCE
Refills: 0 | Status: COMPLETED | OUTPATIENT
Start: 2022-05-26 | End: 2022-05-26

## 2022-05-26 RX ORDER — TRAMADOL HYDROCHLORIDE 50 MG/1
50 TABLET ORAL EVERY 4 HOURS
Refills: 0 | Status: DISCONTINUED | OUTPATIENT
Start: 2022-05-26 | End: 2022-06-02

## 2022-05-26 RX ORDER — METOCLOPRAMIDE HCL 10 MG
5 TABLET ORAL ONCE
Refills: 0 | Status: COMPLETED | OUTPATIENT
Start: 2022-05-26 | End: 2022-05-26

## 2022-05-26 RX ORDER — TRAMADOL HYDROCHLORIDE 50 MG/1
25 TABLET ORAL EVERY 4 HOURS
Refills: 0 | Status: DISCONTINUED | OUTPATIENT
Start: 2022-05-26 | End: 2022-06-01

## 2022-05-26 RX ORDER — LEVALBUTEROL 1.25 MG/.5ML
0.63 SOLUTION, CONCENTRATE RESPIRATORY (INHALATION) EVERY 6 HOURS
Refills: 0 | Status: DISCONTINUED | OUTPATIENT
Start: 2022-05-26 | End: 2022-06-08

## 2022-05-26 RX ADMIN — HEPARIN SODIUM 5000 UNIT(S): 5000 INJECTION INTRAVENOUS; SUBCUTANEOUS at 14:25

## 2022-05-26 RX ADMIN — HEPARIN SODIUM 5000 UNIT(S): 5000 INJECTION INTRAVENOUS; SUBCUTANEOUS at 21:28

## 2022-05-26 RX ADMIN — Medication 5 MILLIGRAM(S): at 09:11

## 2022-05-26 RX ADMIN — TRAMADOL HYDROCHLORIDE 50 MILLIGRAM(S): 50 TABLET ORAL at 17:45

## 2022-05-26 RX ADMIN — SENNA PLUS 2 TABLET(S): 8.6 TABLET ORAL at 21:28

## 2022-05-26 RX ADMIN — ALBUTEROL 2.5 MILLIGRAM(S): 90 AEROSOL, METERED ORAL at 19:04

## 2022-05-26 RX ADMIN — TRAMADOL HYDROCHLORIDE 50 MILLIGRAM(S): 50 TABLET ORAL at 17:18

## 2022-05-26 RX ADMIN — SODIUM CHLORIDE 4 MILLILITER(S): 9 INJECTION INTRAMUSCULAR; INTRAVENOUS; SUBCUTANEOUS at 09:56

## 2022-05-26 RX ADMIN — ALBUTEROL 2.5 MILLIGRAM(S): 90 AEROSOL, METERED ORAL at 05:13

## 2022-05-26 RX ADMIN — TIOTROPIUM BROMIDE 1 CAPSULE(S): 18 CAPSULE ORAL; RESPIRATORY (INHALATION) at 19:58

## 2022-05-26 RX ADMIN — BUDESONIDE AND FORMOTEROL FUMARATE DIHYDRATE 2 PUFF(S): 160; 4.5 AEROSOL RESPIRATORY (INHALATION) at 19:59

## 2022-05-26 RX ADMIN — SODIUM CHLORIDE 4 MILLILITER(S): 9 INJECTION INTRAMUSCULAR; INTRAVENOUS; SUBCUTANEOUS at 05:14

## 2022-05-26 RX ADMIN — TRAMADOL HYDROCHLORIDE 25 MILLIGRAM(S): 50 TABLET ORAL at 13:15

## 2022-05-26 RX ADMIN — ONDANSETRON 4 MILLIGRAM(S): 8 TABLET, FILM COATED ORAL at 06:20

## 2022-05-26 RX ADMIN — Medication 360 MILLIGRAM(S): at 05:45

## 2022-05-26 RX ADMIN — HEPARIN SODIUM 5000 UNIT(S): 5000 INJECTION INTRAVENOUS; SUBCUTANEOUS at 06:23

## 2022-05-26 RX ADMIN — DORNASE ALFA 2.5 MILLIGRAM(S): 1 SOLUTION RESPIRATORY (INHALATION) at 09:56

## 2022-05-26 RX ADMIN — SODIUM CHLORIDE 4 MILLILITER(S): 9 INJECTION INTRAMUSCULAR; INTRAVENOUS; SUBCUTANEOUS at 19:03

## 2022-05-26 RX ADMIN — TRAMADOL HYDROCHLORIDE 50 MILLIGRAM(S): 50 TABLET ORAL at 21:27

## 2022-05-26 RX ADMIN — Medication 650 MILLIGRAM(S): at 17:45

## 2022-05-26 RX ADMIN — Medication 650 MILLIGRAM(S): at 12:44

## 2022-05-26 RX ADMIN — Medication 900 MILLIGRAM(S): at 06:15

## 2022-05-26 RX ADMIN — TRAMADOL HYDROCHLORIDE 25 MILLIGRAM(S): 50 TABLET ORAL at 13:07

## 2022-05-26 RX ADMIN — TRAMADOL HYDROCHLORIDE 50 MILLIGRAM(S): 50 TABLET ORAL at 22:00

## 2022-05-26 RX ADMIN — Medication 650 MILLIGRAM(S): at 17:18

## 2022-05-26 RX ADMIN — Medication 650 MILLIGRAM(S): at 12:29

## 2022-05-26 RX ADMIN — ALBUTEROL 2.5 MILLIGRAM(S): 90 AEROSOL, METERED ORAL at 09:56

## 2022-05-26 NOTE — DIETITIAN INITIAL EVALUATION ADULT - NSFNSGIIOFT_GEN_A_CORE
05-25-22 @ 07:01  -  05-26-22 @ 07:00  --------------------------------------------------------  OUT:    Chest Tube (mL): 85 mL  Total OUT: 85 mL    Total NET: -85 mL      05-26-22 @ 07:01  -  05-26-22 @ 15:50  --------------------------------------------------------  OUT:    Chest Tube (mL): 35 mL  Total OUT: 35 mL    Total NET: -35 mL

## 2022-05-26 NOTE — DIETITIAN INITIAL EVALUATION ADULT - OTHER INFO
68 y/o female with lung nodule now s/p LVATS and TRISTAN lobectomy. Pt eating fairly due to discomfort from recent procedure. NKFA. Food preferences taken and menu alternatives discussed. No GI distress present; pt with hypoactive bowel sounds identified - consider bowel regimen. Receiving IVF of LR and IVPB fluids. No edema nor pressure injuries indicated at present. Discussred ONS of Ensure Clear 2x daily (360 tory and 16 gm protein) which pt is amenable towards receiving. Pt without additional nutrition related issues or concerns at this time. RDN services to remain available as needed.

## 2022-05-26 NOTE — DIETITIAN INITIAL EVALUATION ADULT - PERTINENT MEDS FT
MEDICATIONS  (STANDING):  acetaminophen     Tablet .. 650 milliGRAM(s) Oral every 6 hours  acetaminophen   IVPB .. 900 milliGRAM(s) IV Intermittent once  ALBUTerol    0.083% 2.5 milliGRAM(s) Nebulizer every 6 hours  budesonide 160 MICROgram(s)/formoterol 4.5 MICROgram(s) Inhaler 2 Puff(s) Inhalation two times a day  dornase marco Solution 2.5 milliGRAM(s) Inhalation daily  heparin   Injectable 5000 Unit(s) SubCutaneous every 8 hours  lactated ringers. 1000 milliLiter(s) (30 mL/Hr) IV Continuous <Continuous>  senna 2 Tablet(s) Oral at bedtime  sodium chloride 3%  Inhalation 4 milliLiter(s) Inhalation every 6 hours  tiotropium 18 MICROgram(s) Capsule 1 Capsule(s) Inhalation at bedtime    MEDICATIONS  (PRN):  naloxone Injectable 0.1 milliGRAM(s) IV Push every 3 minutes PRN For ANY of the following changes in patient status:  A. RR LESS THAN 10 breaths per minute, B. Oxygen saturation LESS THAN 90%, C. Sedation score of 6  ondansetron Injectable 4 milliGRAM(s) IV Push every 6 hours PRN Nausea  traMADol 25 milliGRAM(s) Oral every 4 hours PRN Moderate Pain (4 - 6)  traMADol 50 milliGRAM(s) Oral every 4 hours PRN Severe Pain (7 - 10)

## 2022-05-26 NOTE — DIETITIAN INITIAL EVALUATION ADULT - PERTINENT LABORATORY DATA
05-26    139  |  102  |  12  ----------------------------<  111<H>  4.4   |  26  |  0.52    Ca    9.4      26 May 2022 04:30  Phos  3.7     05-26  Mg     2.10     05-26

## 2022-05-26 NOTE — DIETITIAN INITIAL EVALUATION ADULT - NSICDXPASTMEDICALHX_GEN_ALL_CORE_FT
PAST MEDICAL HISTORY:  Emphysema lung     Former smoker     Pulmonary nodule     Renal calculi     Shingles

## 2022-05-26 NOTE — PROGRESS NOTE ADULT - SUBJECTIVE AND OBJECTIVE BOX
Anesthesia Post Operative Note    s/p day 1 of procedure: LVATS/LULobectomy    67y Female POSTOP DAY 1 S/P LVATS/LULobectomy    Vital Signs Last 24 Hrs  T(C): 36.9 (26 May 2022 08:00), Max: 37 (25 May 2022 20:00)  T(F): 98.5 (26 May 2022 08:00), Max: 98.6 (25 May 2022 20:00)  HR: 92 (26 May 2022 15:00) (71 - 114)  BP: 114/76 (26 May 2022 15:00) (93/59 - 127/73)  BP(mean): 88 (26 May 2022 15:00) (71 - 91)  RR: 20 (26 May 2022 15:00) (8 - 25)  SpO2: 92% (26 May 2022 15:00) (75% - 98%)    Patient seen at: bedside    Doing well, no anesthetic complications or complaints noted or reported.  Pain is controlled.

## 2022-05-26 NOTE — PROGRESS NOTE ADULT - SUBJECTIVE AND OBJECTIVE BOX
Anesthesia Pain Management Service- Attending Addendum    SUBJECTIVE: Patient's pain control adequate    Therapy:	  [ X] IV PCA	   [ ] Epidural           [ ] s/p Spinal Opoid              [ ] Postpartum infusion	  [ ] Patient controlled regional anesthesia (PCRA)    [ ] prn Analgesics    Allergies    latex (Rash)  penicillin (Rash)    Intolerances      MEDICATIONS  (STANDING):  acetaminophen     Tablet .. 650 milliGRAM(s) Oral every 6 hours  acetaminophen   IVPB .. 900 milliGRAM(s) IV Intermittent once  ALBUTerol    0.083% 2.5 milliGRAM(s) Nebulizer every 6 hours  budesonide 160 MICROgram(s)/formoterol 4.5 MICROgram(s) Inhaler 2 Puff(s) Inhalation two times a day  dornase marco Solution 2.5 milliGRAM(s) Inhalation daily  heparin   Injectable 5000 Unit(s) SubCutaneous every 8 hours  lactated ringers. 1000 milliLiter(s) (30 mL/Hr) IV Continuous <Continuous>  senna 2 Tablet(s) Oral at bedtime  sodium chloride 3%  Inhalation 4 milliLiter(s) Inhalation every 6 hours  tiotropium 18 MICROgram(s) Capsule 1 Capsule(s) Inhalation at bedtime    MEDICATIONS  (PRN):  naloxone Injectable 0.1 milliGRAM(s) IV Push every 3 minutes PRN For ANY of the following changes in patient status:  A. RR LESS THAN 10 breaths per minute, B. Oxygen saturation LESS THAN 90%, C. Sedation score of 6  ondansetron Injectable 4 milliGRAM(s) IV Push every 6 hours PRN Nausea  traMADol 25 milliGRAM(s) Oral every 4 hours PRN Moderate Pain (4 - 6)  traMADol 50 milliGRAM(s) Oral every 4 hours PRN Severe Pain (7 - 10)      OBJECTIVE:   [X] No new signs     [ ] Other:    Side Effects:  [X ] None			[ ] Other:      ASSESSMENT/PLAN  -Discontinue current therapy    [ ] Therapy changed to:    [ ] IV PCA       [ ] Epidural     [ X] prn Analgesics     Comments: Pain management per primary team, APS to sign off    Note entered after patient seen

## 2022-05-26 NOTE — PROGRESS NOTE ADULT - SUBJECTIVE AND OBJECTIVE BOX
Anesthesia Pain Management Service    SUBJECTIVE: Patient states she has a lot of pain with activity. Patient states the IV PCA was making her nauseous.  Pain Scale Score	At rest: 4/10___ 	With Activity: _7/10__ 	[X ] Refer to charted pain scores    THERAPY:    [ ] IV PCA Morphine		[ ] 5 mg/mL	[ ] 1 mg/mL  [X ] IV PCA Hydromorphone	[ ] 5 mg/mL	[X ] 1 mg/mL  [ ] IV PCA Fentanyl		[ ] 50 micrograms/mL    Demand dose __0.2_ lockout __6_ (minutes) Continuous Rate _0__ Total: _1.8__   mg used (in past 24 hrs)      MEDICATIONS  (STANDING):  acetaminophen     Tablet .. 650 milliGRAM(s) Oral every 6 hours  acetaminophen   IVPB .. 900 milliGRAM(s) IV Intermittent once  ALBUTerol    0.083% 2.5 milliGRAM(s) Nebulizer every 6 hours  budesonide 160 MICROgram(s)/formoterol 4.5 MICROgram(s) Inhaler 2 Puff(s) Inhalation two times a day  dornase marco Solution 2.5 milliGRAM(s) Inhalation daily  heparin   Injectable 5000 Unit(s) SubCutaneous every 8 hours  lactated ringers. 1000 milliLiter(s) (30 mL/Hr) IV Continuous <Continuous>  senna 2 Tablet(s) Oral at bedtime  sodium chloride 3%  Inhalation 4 milliLiter(s) Inhalation every 6 hours  tiotropium 18 MICROgram(s) Capsule 1 Capsule(s) Inhalation at bedtime    MEDICATIONS  (PRN):  naloxone Injectable 0.1 milliGRAM(s) IV Push every 3 minutes PRN For ANY of the following changes in patient status:  A. RR LESS THAN 10 breaths per minute, B. Oxygen saturation LESS THAN 90%, C. Sedation score of 6  ondansetron Injectable 4 milliGRAM(s) IV Push every 6 hours PRN Nausea  traMADol 25 milliGRAM(s) Oral every 4 hours PRN Moderate Pain (4 - 6)  traMADol 50 milliGRAM(s) Oral every 4 hours PRN Severe Pain (7 - 10)      OBJECTIVE: Patient sitting up in chair. CTX1 in place.    Sedation Score:	[ X] Alert	[ ] Drowsy 	[ ] Arousable	[ ] Asleep	[ ] Unresponsive    Side Effects:	[ ] None	[x ] Nausea	[ ] Vomiting	[ ] Pruritus  		[ ] Other:    Vital Signs Last 24 Hrs  T(C): 36.9 (26 May 2022 08:00), Max: 37 (25 May 2022 20:00)  T(F): 98.5 (26 May 2022 08:00), Max: 98.6 (25 May 2022 20:00)  HR: 75 (26 May 2022 10:00) (71 - 97)  BP: 107/66 (26 May 2022 10:00) (93/59 - 122/78)  BP(mean): 80 (26 May 2022 10:00) (71 - 92)  RR: 14 (26 May 2022 10:00) (8 - 25)  SpO2: 98% (26 May 2022 10:00) (92% - 100%)    ASSESSMENT/ PLAN    Therapy to  be:	[ ] Continue   [ X] Discontinued   [X ] Change to prn Analgesics    Documentation and Verification of current medications:   [X] Done	[ ] Not done, not elligible    Comments: Discussed patient with primary team. IV PCA discontinued. PRN Oral/IV opioids and/or Adjuvant non-opioid medication to be ordered at this point.    Progress Note written now but Patient was seen earlier.

## 2022-05-26 NOTE — CHART NOTE - NSCHARTNOTEFT_GEN_A_CORE
Pt is s/p LVATS, TRISTAN Lobectomy. Wilrequire home O2. Resting RA sat 87% and ambulating RA sat 72%. On 3l nasal cannula her resting sat is 93%. Pt states she had been on home oxygen 2 years ago during 2/2 RSV infection Pt is s/p LVATS, TRISTAN Lobectomy. Wilrequire home O2. Resting RA sat 87% and ambulating RA sat 72%. On 4l nasal cannula her resting sat is 98%. Pt states she had been on home oxygen 2 years ago during 2/2 RSV infection

## 2022-05-27 LAB
ANION GAP SERPL CALC-SCNC: 10 MMOL/L — SIGNIFICANT CHANGE UP (ref 7–14)
BLOOD GAS ARTERIAL COMPREHENSIVE RESULT: SIGNIFICANT CHANGE UP
BUN SERPL-MCNC: 12 MG/DL — SIGNIFICANT CHANGE UP (ref 7–23)
CALCIUM SERPL-MCNC: 9.3 MG/DL — SIGNIFICANT CHANGE UP (ref 8.4–10.5)
CHLORIDE SERPL-SCNC: 103 MMOL/L — SIGNIFICANT CHANGE UP (ref 98–107)
CO2 SERPL-SCNC: 28 MMOL/L — SIGNIFICANT CHANGE UP (ref 22–31)
CREAT SERPL-MCNC: 0.43 MG/DL — LOW (ref 0.5–1.3)
D DIMER BLD IA.RAPID-MCNC: 156 NG/ML DDU — SIGNIFICANT CHANGE UP
EGFR: 107 ML/MIN/1.73M2 — SIGNIFICANT CHANGE UP
GLUCOSE SERPL-MCNC: 151 MG/DL — HIGH (ref 70–99)
HCT VFR BLD CALC: 43.8 % — SIGNIFICANT CHANGE UP (ref 34.5–45)
HGB BLD-MCNC: 13.7 G/DL — SIGNIFICANT CHANGE UP (ref 11.5–15.5)
MAGNESIUM SERPL-MCNC: 2.2 MG/DL — SIGNIFICANT CHANGE UP (ref 1.6–2.6)
MCHC RBC-ENTMCNC: 29.8 PG — SIGNIFICANT CHANGE UP (ref 27–34)
MCHC RBC-ENTMCNC: 31.3 GM/DL — LOW (ref 32–36)
MCV RBC AUTO: 95.4 FL — SIGNIFICANT CHANGE UP (ref 80–100)
NRBC # BLD: 0 /100 WBCS — SIGNIFICANT CHANGE UP
NRBC # FLD: 0 K/UL — SIGNIFICANT CHANGE UP
NT-PROBNP SERPL-SCNC: 181 PG/ML — SIGNIFICANT CHANGE UP
PHOSPHATE SERPL-MCNC: 2.6 MG/DL — SIGNIFICANT CHANGE UP (ref 2.5–4.5)
PLATELET # BLD AUTO: 208 K/UL — SIGNIFICANT CHANGE UP (ref 150–400)
POTASSIUM SERPL-MCNC: 4.1 MMOL/L — SIGNIFICANT CHANGE UP (ref 3.5–5.3)
POTASSIUM SERPL-SCNC: 4.1 MMOL/L — SIGNIFICANT CHANGE UP (ref 3.5–5.3)
RBC # BLD: 4.59 M/UL — SIGNIFICANT CHANGE UP (ref 3.8–5.2)
RBC # FLD: 12 % — SIGNIFICANT CHANGE UP (ref 10.3–14.5)
SODIUM SERPL-SCNC: 141 MMOL/L — SIGNIFICANT CHANGE UP (ref 135–145)
WBC # BLD: 12.64 K/UL — HIGH (ref 3.8–10.5)
WBC # FLD AUTO: 12.64 K/UL — HIGH (ref 3.8–10.5)

## 2022-05-27 PROCEDURE — 36600 WITHDRAWAL OF ARTERIAL BLOOD: CPT

## 2022-05-27 PROCEDURE — 99233 SBSQ HOSP IP/OBS HIGH 50: CPT

## 2022-05-27 PROCEDURE — 71045 X-RAY EXAM CHEST 1 VIEW: CPT | Mod: 26

## 2022-05-27 PROCEDURE — 99223 1ST HOSP IP/OBS HIGH 75: CPT

## 2022-05-27 PROCEDURE — 93306 TTE W/DOPPLER COMPLETE: CPT | Mod: 26

## 2022-05-27 RX ORDER — HYDROMORPHONE HYDROCHLORIDE 2 MG/ML
0.5 INJECTION INTRAMUSCULAR; INTRAVENOUS; SUBCUTANEOUS
Refills: 0 | Status: DISCONTINUED | OUTPATIENT
Start: 2022-05-27 | End: 2022-05-29

## 2022-05-27 RX ORDER — HYDROMORPHONE HYDROCHLORIDE 2 MG/ML
0.25 INJECTION INTRAMUSCULAR; INTRAVENOUS; SUBCUTANEOUS ONCE
Refills: 0 | Status: DISCONTINUED | OUTPATIENT
Start: 2022-05-27 | End: 2022-05-27

## 2022-05-27 RX ADMIN — SODIUM CHLORIDE 4 MILLILITER(S): 9 INJECTION INTRAMUSCULAR; INTRAVENOUS; SUBCUTANEOUS at 03:16

## 2022-05-27 RX ADMIN — SODIUM CHLORIDE 4 MILLILITER(S): 9 INJECTION INTRAMUSCULAR; INTRAVENOUS; SUBCUTANEOUS at 15:06

## 2022-05-27 RX ADMIN — TRAMADOL HYDROCHLORIDE 50 MILLIGRAM(S): 50 TABLET ORAL at 01:39

## 2022-05-27 RX ADMIN — SENNA PLUS 2 TABLET(S): 8.6 TABLET ORAL at 22:11

## 2022-05-27 RX ADMIN — Medication 650 MILLIGRAM(S): at 00:18

## 2022-05-27 RX ADMIN — SODIUM CHLORIDE 30 MILLILITER(S): 9 INJECTION, SOLUTION INTRAVENOUS at 07:29

## 2022-05-27 RX ADMIN — Medication 650 MILLIGRAM(S): at 13:00

## 2022-05-27 RX ADMIN — TIOTROPIUM BROMIDE 1 CAPSULE(S): 18 CAPSULE ORAL; RESPIRATORY (INHALATION) at 18:46

## 2022-05-27 RX ADMIN — LEVALBUTEROL 0.63 MILLIGRAM(S): 1.25 SOLUTION, CONCENTRATE RESPIRATORY (INHALATION) at 03:43

## 2022-05-27 RX ADMIN — TRAMADOL HYDROCHLORIDE 50 MILLIGRAM(S): 50 TABLET ORAL at 09:08

## 2022-05-27 RX ADMIN — TRAMADOL HYDROCHLORIDE 50 MILLIGRAM(S): 50 TABLET ORAL at 19:27

## 2022-05-27 RX ADMIN — SODIUM CHLORIDE 4 MILLILITER(S): 9 INJECTION INTRAMUSCULAR; INTRAVENOUS; SUBCUTANEOUS at 10:36

## 2022-05-27 RX ADMIN — HEPARIN SODIUM 5000 UNIT(S): 5000 INJECTION INTRAVENOUS; SUBCUTANEOUS at 22:10

## 2022-05-27 RX ADMIN — BUDESONIDE AND FORMOTEROL FUMARATE DIHYDRATE 2 PUFF(S): 160; 4.5 AEROSOL RESPIRATORY (INHALATION) at 08:12

## 2022-05-27 RX ADMIN — SODIUM CHLORIDE 4 MILLILITER(S): 9 INJECTION INTRAMUSCULAR; INTRAVENOUS; SUBCUTANEOUS at 18:46

## 2022-05-27 RX ADMIN — Medication 650 MILLIGRAM(S): at 12:02

## 2022-05-27 RX ADMIN — HYDROMORPHONE HYDROCHLORIDE 0.25 MILLIGRAM(S): 2 INJECTION INTRAMUSCULAR; INTRAVENOUS; SUBCUTANEOUS at 03:20

## 2022-05-27 RX ADMIN — HEPARIN SODIUM 5000 UNIT(S): 5000 INJECTION INTRAVENOUS; SUBCUTANEOUS at 06:23

## 2022-05-27 RX ADMIN — TRAMADOL HYDROCHLORIDE 50 MILLIGRAM(S): 50 TABLET ORAL at 08:39

## 2022-05-27 RX ADMIN — LEVALBUTEROL 0.63 MILLIGRAM(S): 1.25 SOLUTION, CONCENTRATE RESPIRATORY (INHALATION) at 18:46

## 2022-05-27 RX ADMIN — LEVALBUTEROL 0.63 MILLIGRAM(S): 1.25 SOLUTION, CONCENTRATE RESPIRATORY (INHALATION) at 15:06

## 2022-05-27 RX ADMIN — HYDROMORPHONE HYDROCHLORIDE 0.25 MILLIGRAM(S): 2 INJECTION INTRAMUSCULAR; INTRAVENOUS; SUBCUTANEOUS at 03:35

## 2022-05-27 RX ADMIN — HEPARIN SODIUM 5000 UNIT(S): 5000 INJECTION INTRAVENOUS; SUBCUTANEOUS at 14:00

## 2022-05-27 RX ADMIN — Medication 40 MILLIGRAM(S): at 22:11

## 2022-05-27 RX ADMIN — BUDESONIDE AND FORMOTEROL FUMARATE DIHYDRATE 2 PUFF(S): 160; 4.5 AEROSOL RESPIRATORY (INHALATION) at 18:47

## 2022-05-27 RX ADMIN — TRAMADOL HYDROCHLORIDE 50 MILLIGRAM(S): 50 TABLET ORAL at 02:30

## 2022-05-27 RX ADMIN — Medication 650 MILLIGRAM(S): at 06:23

## 2022-05-27 RX ADMIN — Medication 650 MILLIGRAM(S): at 18:33

## 2022-05-27 RX ADMIN — Medication 650 MILLIGRAM(S): at 19:00

## 2022-05-27 RX ADMIN — DORNASE ALFA 2.5 MILLIGRAM(S): 1 SOLUTION RESPIRATORY (INHALATION) at 15:06

## 2022-05-27 RX ADMIN — TRAMADOL HYDROCHLORIDE 50 MILLIGRAM(S): 50 TABLET ORAL at 20:00

## 2022-05-27 NOTE — CONSULT NOTE ADULT - TIME BILLING
Review of patient records, including history, laboratory data, and imaging. Patient evaluation and assessment. Coordination of care.

## 2022-05-27 NOTE — PHYSICAL THERAPY INITIAL EVALUATION ADULT - ADDITIONAL COMMENTS
Patient lives with  in private house, +3 steps to enter, +12 steps to second floor bedroom, patient was independent with all mobility and ADL performance prior to admission. Denies using assistive device.    Patient left seated in recliner at bedside, all lines intact, call bell in reach, in NAD. RN present and aware.

## 2022-05-27 NOTE — CONSULT NOTE ADULT - SUBJECTIVE AND OBJECTIVE BOX
CHIEF COMPLAINT: Hypoxemia    HPI:    67F former smoker (30PY, quit 2009) with COPD (improved on trelegy but was longer taking because too expensive), admitted for TRISTAN lobectomy for 0.8 cm TRISTAN PET avid ling nodule which was performed on 5/26. Pulmonary consulted for postoperative hypoxemia requiring high flow nasal canula. Per outpatient records, PFT on 1/10/22: FVC 2.81 L (110%), FEV1 1.43L (71%), DLCO 1.69L (40%). Pt sees pulmonologist Joseluis Urbina.       PAST MEDICAL & SURGICAL HISTORY:  Emphysema lung      Shingles      Renal calculi      Pulmonary nodule      Former smoker      History of laparoscopic cholecystectomy  1995      S/P cystoscopy  with right renal stent insertion 5/8/18      Elective surgery  hernia repair 2014 - with mesh          FAMILY HISTORY:  Family history of heart attack (Father)        SOCIAL HISTORY:  Smoking: [ ] Never Smoked [ ] Former Smoker (__ packs x ___ years) [ ] Current Smoker  (__ packs x ___ years)  Substance Use: [ ] Never Used [ ] Used ____  EtOH Use:  Marital Status: [ ] Single [ ]  [ ]  [ ]   Sexual History:   Occupation:  Recent Travel:  Country of Birth:  Advance Directives:    Allergies    latex (Rash)  penicillin (Rash)    Intolerances        HOME MEDICATIONS:  Home Medications:  fluticasone-salmeterol 250 mcg-50 mcg inhalation powder: 1 puff(s) inhaled 2 times a day (25 May 2022 06:59)  Spiriva Respimat 1.25 mcg/inh inhalation aerosol: 2 puff(s) inhaled once a day (25 May 2022 06:59)      REVIEW OF SYSTEMS:  Constitutional: [ ] negative [ ] fevers [ ] chills [ ] weight loss [ ] weight gain [ ] malaise  HEENT: [ ] negative [ ] visual disturbances [ ] postnasal drip [ ] nasal congestion [ ] sore throat  CV: [ ] negative [ ] chest pain [ ] palpitations [ ] orthopnea   Resp: [ ] negative [ ] cough [ ] shortness of breath [ ] wheezing [ ] sputum [ ] hemoptysis  GI: [ ] negative [ ] nausea [ ] vomiting [ ] abdominal pain [ ] dysphagia [ ] diarrhea [ ] melena [ ] hematochezia  : [ ] negative [ ] dysuria [ ] nocturia [ ] hematuria [ ] increased urinary frequency  Musculoskeletal: [ ] negative [ ] back pain [ ] myalgias [ ] arthralgias [ ] fracture  Skin: [ ] negative [ ] rash [ ] pruritus  Neurological: [ ] negative [ ] headache [ ] dizziness [ ] syncope [ ] weakness [ ] numbness  Psychiatric: [ ] negative [ ] anxiety [ ] depression  Endocrine: [ ] negative [ ] diabetes [ ] thyroid problem  Hematologic/Lymphatic: [ ] negative [ ] anemia [ ] bleeding problem  Allergic/Immunologic: [ ] hives [ ] angioedema  [ ] All other systems negative  [ ] Unable to assess ROS because ________    OBJECTIVE:  ICU Vital Signs Last 24 Hrs  T(C): 36.8 (27 May 2022 12:00), Max: 37 (26 May 2022 20:00)  T(F): 98.3 (27 May 2022 12:00), Max: 98.6 (26 May 2022 20:00)  HR: 83 (27 May 2022 13:00) (71 - 110)  BP: 117/68 (27 May 2022 13:00) (97/62 - 133/81)  BP(mean): 84 (27 May 2022 13:00) (74 - 98)  ABP: --  ABP(mean): --  RR: 15 (27 May 2022 13:00) (9 - 26)  SpO2: 85% (27 May 2022 13:00) (83% - 100%)        CAPILLARY BLOOD GLUCOSE          PHYSICAL EXAM:  General:   HEENT:   Lymph Nodes:  Neck:   Respiratory:   Cardiovascular:   Abdomen:   Extremities:   Skin:   Neurological:  Psychiatry:    LINES:     HOSPITAL MEDICATIONS:  Standing Meds:  acetaminophen     Tablet .. 650 milliGRAM(s) Oral every 6 hours  acetaminophen   IVPB .. 900 milliGRAM(s) IV Intermittent once  budesonide 160 MICROgram(s)/formoterol 4.5 MICROgram(s) Inhaler 2 Puff(s) Inhalation two times a day  dornase marco Solution 2.5 milliGRAM(s) Inhalation daily  heparin   Injectable 5000 Unit(s) SubCutaneous every 8 hours  levalbuterol Inhalation 0.63 milliGRAM(s) Inhalation every 6 hours  senna 2 Tablet(s) Oral at bedtime  sodium chloride 3%  Inhalation 4 milliLiter(s) Inhalation every 6 hours  tiotropium 18 MICROgram(s) Capsule 1 Capsule(s) Inhalation at bedtime      PRN Meds:  naloxone Injectable 0.1 milliGRAM(s) IV Push every 3 minutes PRN  ondansetron Injectable 4 milliGRAM(s) IV Push every 6 hours PRN  traMADol 25 milliGRAM(s) Oral every 4 hours PRN  traMADol 50 milliGRAM(s) Oral every 4 hours PRN      LABS:                        13.7   12.64 )-----------( 208      ( 27 May 2022 05:30 )             43.8     Hgb Trend: 13.7<--, 14.1<--  05-27    141  |  103  |  12  ----------------------------<  151<H>  4.1   |  28  |  0.43<L>    Ca    9.3      27 May 2022 05:30  Phos  2.6     05-27  Mg     2.20     05-27      Creatinine Trend: 0.43<--, 0.52<--, 0.61<--            MICROBIOLOGY:       RADIOLOGY:  [ ] Reviewed and interpreted by me    PULMONARY FUNCTION TESTS:    EKG:   CHIEF COMPLAINT: Shortness of Breath    HPI:    67F former smoker ( 40 PY, quit 2009) with COPD (improved on trelegy but was longer taking because too expensive), admitted for TRISTAN lobectomy for 9.4 cm TRISTAN PET avid lung nodule (which had been growing in size) performed on 5/25. Pulmonary consulted for hypoxemia 2 days postop requiring high flow nasal canula yesterday and 100% NRB today. Pt says at baseline she does not use oxygen but has dyspnea on exertion with one flight of stairs. Now, she desaturates to the 70s with just slight movements in the chair on 5L NC, placed back on 100% NRB. No fever, denies cough and sputum but she has pleuritic chest pain. Pt previously took trelegy but now takes advair and spiriva.      Per outpatient records, PFT on 1/10/22: FVC 2.81 L (110%), FEV1 1.43L (71%), DLCO 1.69L (40%). Previous CT [facility] with severe upper lobe predominant centrilobular emphysema. Pt sees pulmonologist Joseluis Urbina.    POCUS a-line predominant with lung sliding b/l, no consolidations or pleural effusions.       PAST MEDICAL & SURGICAL HISTORY:  Emphysema lung      Shingles      Renal calculi      Pulmonary nodule      Former smoker      History of laparoscopic cholecystectomy  1995      S/P cystoscopy  with right renal stent insertion 5/8/18      Elective surgery  hernia repair 2014 - with mesh          FAMILY HISTORY:  Family history of heart attack (Father)        SOCIAL HISTORY:  Smoking: [ ] Never Smoked [x] Former Smoker (_1_ packs x _40_ years) [ ] Current Smoker  (__ packs x ___ years)  Substance Use: [ ] Never Used [ ] Used ____  EtOH Use:  Marital Status: [ ] Single [ ]  [ ]  [ ]   Sexual History:   Occupation:  Recent Travel:  Country of Birth:  Advance Directives:    Allergies    latex (Rash)  penicillin (Rash)    Intolerances        HOME MEDICATIONS:  Home Medications:  fluticasone-salmeterol 250 mcg-50 mcg inhalation powder: 1 puff(s) inhaled 2 times a day (25 May 2022 06:59)  Spiriva Respimat 1.25 mcg/inh inhalation aerosol: 2 puff(s) inhaled once a day (25 May 2022 06:59)      REVIEW OF SYSTEMS:  Constitutional: [ ] negative [ ] fevers [ ] chills [ ] weight loss [ ] weight gain [ ] malaise  HEENT: [ ] negative [ ] visual disturbances [ ] postnasal drip [ ] nasal congestion [ ] sore throat  CV: [ ] negative [ ] chest pain [ ] palpitations [ ] orthopnea   Resp: [ ] negative [ ] cough [x] shortness of breath [ ] wheezing [ ] sputum [ ] hemoptysis  GI: [ ] negative [ ] nausea [ ] vomiting [ ] abdominal pain [ ] dysphagia [ ] diarrhea [ ] melena [ ] hematochezia  : [ ] negative [ ] dysuria [ ] nocturia [ ] hematuria [ ] increased urinary frequency  Musculoskeletal: [ ] negative [ ] back pain [ ] myalgias [ ] arthralgias [ ] fracture  Skin: [ ] negative [ ] rash [ ] pruritus  Neurological: [ ] negative [ ] headache [ ] dizziness [ ] syncope [ ] weakness [ ] numbness  Psychiatric: [ ] negative [ ] anxiety [ ] depression  Endocrine: [ ] negative [ ] diabetes [ ] thyroid problem  Hematologic/Lymphatic: [ ] negative [ ] anemia [ ] bleeding problem  Allergic/Immunologic: [ ] hives [ ] angioedema  [x] All other systems negative  [ ] Unable to assess ROS because ________    OBJECTIVE:  ICU Vital Signs Last 24 Hrs  T(C): 36.8 (27 May 2022 12:00), Max: 37 (26 May 2022 20:00)  T(F): 98.3 (27 May 2022 12:00), Max: 98.6 (26 May 2022 20:00)  HR: 83 (27 May 2022 13:00) (71 - 110)  BP: 117/68 (27 May 2022 13:00) (97/62 - 133/81)  BP(mean): 84 (27 May 2022 13:00) (74 - 98)  ABP: --  ABP(mean): --  RR: 15 (27 May 2022 13:00) (9 - 26)  SpO2: 85% (27 May 2022 13:00) (83% - 100%)        CAPILLARY BLOOD GLUCOSE          PHYSICAL EXAM:  General: NAD, mildly tachypneic   Skin: Warm and dry, no rashes  Neuro: AAOx3, nonfocal  HEENT: PERRL, EOMI, no oral lesions  Neck: Full range of motion, no JVD  Lungs: Very shallow breath sounds with poor air movement but no wheezing, no accessory muscle use  Heart: Regular rate and rhythm, no murmurs  Abdomen: Normoactive bowl sounds, soft, nontender, nondistended  Extremities: No lower extremity tenderness, erythema, or edema   Psych: normal mood and affect      HOSPITAL MEDICATIONS:  Standing Meds:  acetaminophen     Tablet .. 650 milliGRAM(s) Oral every 6 hours  acetaminophen   IVPB .. 900 milliGRAM(s) IV Intermittent once  budesonide 160 MICROgram(s)/formoterol 4.5 MICROgram(s) Inhaler 2 Puff(s) Inhalation two times a day  dornase marco Solution 2.5 milliGRAM(s) Inhalation daily  heparin   Injectable 5000 Unit(s) SubCutaneous every 8 hours  levalbuterol Inhalation 0.63 milliGRAM(s) Inhalation every 6 hours  senna 2 Tablet(s) Oral at bedtime  sodium chloride 3%  Inhalation 4 milliLiter(s) Inhalation every 6 hours  tiotropium 18 MICROgram(s) Capsule 1 Capsule(s) Inhalation at bedtime      PRN Meds:  naloxone Injectable 0.1 milliGRAM(s) IV Push every 3 minutes PRN  ondansetron Injectable 4 milliGRAM(s) IV Push every 6 hours PRN  traMADol 25 milliGRAM(s) Oral every 4 hours PRN  traMADol 50 milliGRAM(s) Oral every 4 hours PRN      LABS:                        13.7   12.64 )-----------( 208      ( 27 May 2022 05:30 )             43.8     Hgb Trend: 13.7<--, 14.1<--  05-27    141  |  103  |  12  ----------------------------<  151<H>  4.1   |  28  |  0.43<L>    Ca    9.3      27 May 2022 05:30  Phos  2.6     05-27  Mg     2.20     05-27      Creatinine Trend: 0.43<--, 0.52<--, 0.61<--

## 2022-05-27 NOTE — PHYSICAL THERAPY INITIAL EVALUATION ADULT - PERTINENT HX OF CURRENT PROBLEM, REHAB EVAL
Pt is a 67 yr old female scheduled for Flexible Bronchoscopy Left Video Assisted Thoracoscopy Left Upper Lobectomy Poss Lingular Sparing with Dr Villegas tentatively 5/25/22 - pt former smoker and had CT scan where pulmonary nodule was noted - pt denies pain or cough - some mild SOB with exertion; S/P Left VATS 05/25/22

## 2022-05-27 NOTE — PROCEDURE NOTE - NSNEEDLEGAUGE_GEN_A_CORE
Progress Note - Chapin Helton 1991, 33 y o  male MRN: [id number]    Unit/Bed#: [facility] 817-01 Encounter: [id number]    Primary Care Provider: Marcio Turner DO   Date and time admitted to hospital: 3/4/2019  1:20 PM        Urinary retention  Assessment & Plan  Patient requiring several straight caths, will now have ramos placed  Will consult urology for recommendations    Type I or II open fracture of left tibial plateau  Assessment & Plan  See below    Type I or II open comminuted fracture of left patella  Assessment & Plan  Continue Ancef q8h for total of 48 hrs  Consider extending treatment as patient had febrile recordings overnight    Closed displaced comminuted fracture of shaft of left femur ([address] )  Assessment & Plan  See below    * Closed fracture of neck of left femur (HCC)  Assessment & Plan  NWB LLE for all documented fractures  PT/OT following; recommend d/c home in 1-2 days  CM for disposition planning  Pain control  DVT Ppx            Subjective/Objective   Chief Complaint:     Subjective: Patient febrile to 101  Fevers have responded to Tylenol though continue to have low grade readings overnight  Urinary retention continued now requiring ramos placement  Patient initially not requesting pain medications until pain level was severe  Resolved with tylenol/dilaudid  Objective:     Meds/Allergies   No medications prior to admission  Vitals: Blood pressure 116/59, pulse (!) 106, temperature 99 1 °F (37 3 °C), temperature source Oral, resp  rate 18, height 5' 6" (1 676 m), weight 73 5 kg (162 lb), SpO2 92 %  Body mass index is 26 15 kg/m²   SpO2: SpO2: 92 %    ABG: No results found for: PHART, ZOZ3DAO, PO2ART, TOF8UZU, T5HCMUZC, BEART, SOURCE      Intake/Output Summary (Last 24 hours) at 3/7/2019 0726  Last data filed at 3/6/2019 2130  Gross per 24 hour   Intake 50 ml   Output 950 ml   Net -900 ml       Invasive Devices     Peripheral Intravenous Line            Peripheral IV 03/04/19 20 Right Antecubital 2 days                Nutrition/GI Proph/Bowel Reg: Regular house diet    Physical Exam:   Physical Exam   Constitutional: He is oriented to person, place, and time  He appears well-developed and well-nourished  No distress  HENT:   Head: Normocephalic and atraumatic  Eyes: Pupils are equal, round, and reactive to light  Conjunctivae and EOM are normal  No scleral icterus  Neck: Normal range of motion  Neck supple  Cardiovascular: Normal rate, regular rhythm and normal heart sounds  No murmur heard  Pulmonary/Chest: Effort normal and breath sounds normal  No respiratory distress  He has no wheezes  Abdominal: Soft  Bowel sounds are normal  He exhibits no distension  There is no tenderness  Musculoskeletal: He exhibits tenderness  He exhibits no edema  LLE immobilized  Neuro intact  Equal, intact distal pulses   Neurological: He is alert and oriented to person, place, and time  No sensory deficit  Skin: Skin is warm and dry  No rash noted  He is not diaphoretic  Psychiatric: He has a normal mood and affect  His behavior is normal    Nursing note and vitals reviewed          Lab Results: BMP/CMP:   Lab Results   Component Value Date    SODIUM 134 (L) 03/07/2019    K 3 5 03/07/2019     03/07/2019    CO2 30 03/07/2019    BUN 7 03/07/2019    CREATININE 0 60 03/07/2019    CALCIUM 8 0 (L) 03/07/2019    EGFR 139 03/07/2019    and CBC: No results found for: WBC, HGB, HCT, MCV, PLT, ADJUSTEDWBC, MCH, MCHC, RDW, MPV, NRBC  Imaging/EKG Studies: Results: I have personally reviewed pertinent films in PACS  Other Studies:   VTE Prophylaxis: Lovenox/SCDs    Bedside rounds completed with nurse [place]

## 2022-05-27 NOTE — PHYSICAL THERAPY INITIAL EVALUATION ADULT - GENERAL OBSERVATIONS, REHAB EVAL
Patient received seated in recliner at bedside, +chest tubes, +cardiac monitor, in NAD. Pt agreeable to participate in PT evaluation.

## 2022-05-27 NOTE — PROGRESS NOTE ADULT - SUBJECTIVE AND OBJECTIVE BOX
DEMI CHACKO                     MRN-[id number]    HPI:  Pt is a 67 yr old female scheduled for Flexible Bronchoscopy Left Video Assisted Thoracoscopy Left Upper Lobectomy Poss Lingular Sparing with Dr Villegas tentatively 5/25/22 - pt former smoker and had CT scan where pulmonary nodule was noted - pt denies pain or cough - some mild SOB with exertion   Patient instructed to contact surgeon's office concerning COVID test prior to surgery    (19 May 2022 10:00)    Procedure:  Uniportal Left VATS TRISTAN Lobectomy 5/25/2022Uniportal Left VATS TRISTAN Lobectomy 5/25/2022                       Issues:              Lung nodule              Postop pain              Chest tube in place  COPD  Hx of kidney stones s/p R ureter stent (5/2018)   Hx of shingles  Former smoker  Postop hypoxemia      PAST MEDICAL & SURGICAL HISTORY:  Emphysema lung      Shingles      Renal calculi      Pulmonary nodule      Former smoker      History of laparoscopic cholecystectomy  1995      S/P cystoscopy  with right renal stent insertion 5/8/18      Elective surgery  hernia repair 2014 - with mesh                VITAL SIGNS:  Vital Signs Last 24 Hrs  T(C): 36.8 (27 May 2022 08:00), Max: 37 (26 May 2022 20:00)  T(F): 98.2 (27 May 2022 08:00), Max: 98.6 (26 May 2022 20:00)  HR: 84 (27 May 2022 10:45) (71 - 114)  BP: 122/68 (27 May 2022 10:45) (97/62 - 133/81)  BP(mean): 85 (27 May 2022 10:45) (74 - 98)  RR: 17 (27 May 2022 10:45) (9 - 26)  SpO2: 97% (27 May 2022 10:45) (75% - 100%)    I/Os:   I&O's Detail    26 May 2022 07:01  -  27 May 2022 07:00  --------------------------------------------------------  IN:    Lactated Ringers: 720 mL    Oral Fluid: 400 mL  Total IN: 1120 mL    OUT:    Chest Tube (mL): 170 mL    Voided (mL): 1700 mL  Total OUT: 1870 mL    Total NET: -750 mL      27 May 2022 07:01  -  27 May 2022 11:13  --------------------------------------------------------  IN:    Lactated Ringers: 60 mL  Total IN: 60 mL    OUT:    Voided (mL): 200 mL  Total OUT: 200 mL    Total NET: -140 mL          CAPILLARY BLOOD GLUCOSE          =======================MEDICATIONS===================  MEDICATIONS  (STANDING):  acetaminophen     Tablet .. 650 milliGRAM(s) Oral every 6 hours  acetaminophen   IVPB .. 900 milliGRAM(s) IV Intermittent once  budesonide 160 MICROgram(s)/formoterol 4.5 MICROgram(s) Inhaler 2 Puff(s) Inhalation two times a day  dornase marco Solution 2.5 milliGRAM(s) Inhalation daily  heparin   Injectable 5000 Unit(s) SubCutaneous every 8 hours  levalbuterol Inhalation 0.63 milliGRAM(s) Inhalation every 6 hours  senna 2 Tablet(s) Oral at bedtime  sodium chloride 3%  Inhalation 4 milliLiter(s) Inhalation every 6 hours  tiotropium 18 MICROgram(s) Capsule 1 Capsule(s) Inhalation at bedtime    MEDICATIONS  (PRN):  naloxone Injectable 0.1 milliGRAM(s) IV Push every 3 minutes PRN For ANY of the following changes in patient status:  A. RR LESS THAN 10 breaths per minute, B. Oxygen saturation LESS THAN 90%, C. Sedation score of 6  ondansetron Injectable 4 milliGRAM(s) IV Push every 6 hours PRN Nausea  traMADol 25 milliGRAM(s) Oral every 4 hours PRN Moderate Pain (4 - 6)  traMADol 50 milliGRAM(s) Oral every 4 hours PRN Severe Pain (7 - 10)      PHYSICAL EXAM============================  General:                         Awake, alert, not in any distress  Neuro:                            Moving all extremities to commands.   Respiratory:	Air entry fair and  bilateral conducted sounds                                           Effort even and unlabored.  CV:		Regular rate and rhythm. Normal S1/S2                                          Distal pulses present.  Abdomen:	                     Soft, non-distended. Bowel sounds present   Skin:		No rash.  Extremities:	Warm, no cyanosis or edema.  Palpable pulses    ============================LABS=========================                        13.7   12.64 )-----------( 208      ( 27 May 2022 05:30 )             43.8     05-27    141  |  103  |  12  ----------------------------<  151<H>  4.1   |  28  |  0.43<L>    Ca    9.3      27 May 2022 05:30  Phos  2.6     05-27  Mg     2.20     05-27      A/P:  67yFemale s/p Uniportal Left VATS TRISTAN Lobectomy 5/25/2022Uniportal Left VATS TRISTAN Lobectomy 5/25/2022, experiencing  pain with deep breathing.                             Neuro:                                         Pain control with PCA /  Tylenol PRN                            Cardiovascular:                                          Telemetry (medical test) - Reviewed by me today independently. Normal sinus rhythm.                                          Continue hemodynamic monitoring to prevent decompensation.                            Respiratory:                                         Postop hypoxemia requiring O2 via nasal cannula probably due to underlying severe COPD,  postop pain - pt desaturated to low 80,s on O2 while ambulating, will need home o2                                              Encourage incentive spirometry.   Pt will need home 02                                                  Chest PT and frequent suctioning.     COPD: Continue bronchodilators, Pulmozyme and inhaled 3% saline inhalations.                                                       OOB to chair & ambulate w/ assistance.                                                           Continuous pulse oximetry for support & to prevent decompensation.                                          Monitor chest tube output                                         Chest tube to  water seal                                                                                            GI                                         On  regular diet as tolerated                                         Continue Zofran / Reglan for nausea - PRN                                         Continue bowel regimen	                                                                 Renal:                                         Continue LR  30cc/hr                                         Monitor I/Os and electrolytes                                                                                        Hem/ Onc:                                         DVT prophylaxis with SQ Heparin and SCDs                                         Monitor chest tube output &  signs of bleeding.                                          Follow CBC in AM                           Infectious disease:                                            Monitor for fever / leukocytosis.                                          All surgical incision / chest tube  sites look clean                            Endocrine                                             Continue Accu-Checks with coverage                                                  Pertinent clinical, laboratory, radiographic, hemodynamic, echocardiographic, respiratory data, microbiologic data and chart were reviewed and analyzed frequently throughout the course of the day and night.     Patient seen, examined and plan discussed with CT Surgeon Dr. Villegas / CTICU team during rounds.    OOB to chair and ambulate as tolerated.     Status discussed with patient and updated plan of care.     I have spent 35 minutes with this patient including 20 minutes of time coordinating care in the ICU..      Teri Proctor DO, FACEP

## 2022-05-27 NOTE — PHYSICAL THERAPY INITIAL EVALUATION ADULT - MANUAL MUSCLE TESTING RESULTS, REHAB EVAL
Addended by: MARCO ANTONIO WILEY on: 11/4/2020 05:45 PM     Modules accepted: Orders    
bilateral UE and LE grossly 3/5 throughout

## 2022-05-27 NOTE — CONSULT NOTE ADULT - ASSESSMENT
67F former smoker (40 PY, quit 2009) with COPD (FEV1 71% but DLCO 40%) admitted for TRISTAN lobectomy for 9.4 cm TRISTAN PET avid lung nodule (which had been growing in size) performed on 5/25. Pulmonary consulted for hypoxemia 2 days postop. Pt says at baseline she does not use oxygen but has dyspnea on exertion with one flight of stairs. Now, she desaturates to the 70s with just slight movements in the chair on 5L NC, placed back on 100% NRB. POCUS a-line predominant with lung sliding and without consolidations or pleural effusions. Pt has no wheezing on exam but is not moving air well, concern for COPD exacerbation versus pulmonary embolism.     Problems  Hypoxemic Respiratory Failure    Recommendations  - Would change all inhalers to nebulizers for now: duoneb q6h standing and budesonide 0.5 mg BID instead of symbicort, spiriva, and levalbuterol (she can resume inhalers when less acute)  - Would consider starting methylprednisolone 40 mg IV daily for COPD exacerbation if not contraindicated due to wound healing issues  - Would check ABG as rising bicarb may be due to hypercapnia   - Check d-dimer, if abnormal would send for CTA chest to rule out PE  - TTE to assess for pulmonary HTN  - LE doppler ultrasound to assess for DVT  - Supplemental O2 to mantain spO2 > 88%    Mansoor Morton MD  Fellow, Pulmonary and Critical Care Medicine  [facility]  Pager: [phone], [phone] ([facility])  Email: [email or web address]

## 2022-05-28 LAB
ANION GAP SERPL CALC-SCNC: 10 MMOL/L — SIGNIFICANT CHANGE UP (ref 7–14)
BUN SERPL-MCNC: 13 MG/DL — SIGNIFICANT CHANGE UP (ref 7–23)
CALCIUM SERPL-MCNC: 9.3 MG/DL — SIGNIFICANT CHANGE UP (ref 8.4–10.5)
CHLORIDE SERPL-SCNC: 102 MMOL/L — SIGNIFICANT CHANGE UP (ref 98–107)
CO2 SERPL-SCNC: 28 MMOL/L — SIGNIFICANT CHANGE UP (ref 22–31)
CREAT SERPL-MCNC: 0.37 MG/DL — LOW (ref 0.5–1.3)
EGFR: 111 ML/MIN/1.73M2 — SIGNIFICANT CHANGE UP
GLUCOSE SERPL-MCNC: 167 MG/DL — HIGH (ref 70–99)
HCT VFR BLD CALC: 39.7 % — SIGNIFICANT CHANGE UP (ref 34.5–45)
HGB BLD-MCNC: 13.3 G/DL — SIGNIFICANT CHANGE UP (ref 11.5–15.5)
MAGNESIUM SERPL-MCNC: 2 MG/DL — SIGNIFICANT CHANGE UP (ref 1.6–2.6)
MCHC RBC-ENTMCNC: 30.1 PG — SIGNIFICANT CHANGE UP (ref 27–34)
MCHC RBC-ENTMCNC: 33.5 GM/DL — SIGNIFICANT CHANGE UP (ref 32–36)
MCV RBC AUTO: 89.8 FL — SIGNIFICANT CHANGE UP (ref 80–100)
NRBC # BLD: 0 /100 WBCS — SIGNIFICANT CHANGE UP
NRBC # FLD: 0 K/UL — SIGNIFICANT CHANGE UP
PHOSPHATE SERPL-MCNC: 2.4 MG/DL — LOW (ref 2.5–4.5)
PLATELET # BLD AUTO: 185 K/UL — SIGNIFICANT CHANGE UP (ref 150–400)
POTASSIUM SERPL-MCNC: 3.9 MMOL/L — SIGNIFICANT CHANGE UP (ref 3.5–5.3)
POTASSIUM SERPL-SCNC: 3.9 MMOL/L — SIGNIFICANT CHANGE UP (ref 3.5–5.3)
RBC # BLD: 4.42 M/UL — SIGNIFICANT CHANGE UP (ref 3.8–5.2)
RBC # FLD: 12 % — SIGNIFICANT CHANGE UP (ref 10.3–14.5)
SODIUM SERPL-SCNC: 140 MMOL/L — SIGNIFICANT CHANGE UP (ref 135–145)
WBC # BLD: 8.53 K/UL — SIGNIFICANT CHANGE UP (ref 3.8–10.5)
WBC # FLD AUTO: 8.53 K/UL — SIGNIFICANT CHANGE UP (ref 3.8–10.5)

## 2022-05-28 PROCEDURE — 71045 X-RAY EXAM CHEST 1 VIEW: CPT | Mod: 26

## 2022-05-28 PROCEDURE — 99233 SBSQ HOSP IP/OBS HIGH 50: CPT

## 2022-05-28 RX ORDER — IPRATROPIUM BROMIDE 0.2 MG/ML
500 SOLUTION, NON-ORAL INHALATION EVERY 6 HOURS
Refills: 0 | Status: DISCONTINUED | OUTPATIENT
Start: 2022-05-28 | End: 2022-06-08

## 2022-05-28 RX ORDER — HYDROMORPHONE HYDROCHLORIDE 2 MG/ML
0.25 INJECTION INTRAMUSCULAR; INTRAVENOUS; SUBCUTANEOUS ONCE
Refills: 0 | Status: DISCONTINUED | OUTPATIENT
Start: 2022-05-28 | End: 2022-05-28

## 2022-05-28 RX ORDER — BUDESONIDE, MICRONIZED 100 %
0.5 POWDER (GRAM) MISCELLANEOUS EVERY 12 HOURS
Refills: 0 | Status: DISCONTINUED | OUTPATIENT
Start: 2022-05-28 | End: 2022-06-08

## 2022-05-28 RX ADMIN — TRAMADOL HYDROCHLORIDE 50 MILLIGRAM(S): 50 TABLET ORAL at 12:01

## 2022-05-28 RX ADMIN — ONDANSETRON 4 MILLIGRAM(S): 8 TABLET, FILM COATED ORAL at 22:49

## 2022-05-28 RX ADMIN — TRAMADOL HYDROCHLORIDE 50 MILLIGRAM(S): 50 TABLET ORAL at 18:35

## 2022-05-28 RX ADMIN — Medication 650 MILLIGRAM(S): at 12:00

## 2022-05-28 RX ADMIN — TRAMADOL HYDROCHLORIDE 25 MILLIGRAM(S): 50 TABLET ORAL at 08:48

## 2022-05-28 RX ADMIN — LEVALBUTEROL 0.63 MILLIGRAM(S): 1.25 SOLUTION, CONCENTRATE RESPIRATORY (INHALATION) at 21:11

## 2022-05-28 RX ADMIN — SODIUM CHLORIDE 4 MILLILITER(S): 9 INJECTION INTRAMUSCULAR; INTRAVENOUS; SUBCUTANEOUS at 14:10

## 2022-05-28 RX ADMIN — HYDROMORPHONE HYDROCHLORIDE 0.25 MILLIGRAM(S): 2 INJECTION INTRAMUSCULAR; INTRAVENOUS; SUBCUTANEOUS at 15:35

## 2022-05-28 RX ADMIN — TRAMADOL HYDROCHLORIDE 25 MILLIGRAM(S): 50 TABLET ORAL at 15:49

## 2022-05-28 RX ADMIN — Medication 650 MILLIGRAM(S): at 07:25

## 2022-05-28 RX ADMIN — TRAMADOL HYDROCHLORIDE 50 MILLIGRAM(S): 50 TABLET ORAL at 03:21

## 2022-05-28 RX ADMIN — HYDROMORPHONE HYDROCHLORIDE 0.5 MILLIGRAM(S): 2 INJECTION INTRAMUSCULAR; INTRAVENOUS; SUBCUTANEOUS at 22:05

## 2022-05-28 RX ADMIN — HEPARIN SODIUM 5000 UNIT(S): 5000 INJECTION INTRAVENOUS; SUBCUTANEOUS at 13:57

## 2022-05-28 RX ADMIN — HYDROMORPHONE HYDROCHLORIDE 0.25 MILLIGRAM(S): 2 INJECTION INTRAMUSCULAR; INTRAVENOUS; SUBCUTANEOUS at 15:49

## 2022-05-28 RX ADMIN — Medication 40 MILLIGRAM(S): at 06:32

## 2022-05-28 RX ADMIN — Medication 650 MILLIGRAM(S): at 12:01

## 2022-05-28 RX ADMIN — LEVALBUTEROL 0.63 MILLIGRAM(S): 1.25 SOLUTION, CONCENTRATE RESPIRATORY (INHALATION) at 14:09

## 2022-05-28 RX ADMIN — Medication 40 MILLIGRAM(S): at 13:56

## 2022-05-28 RX ADMIN — Medication 500 MICROGRAM(S): at 09:12

## 2022-05-28 RX ADMIN — Medication 40 MILLIGRAM(S): at 21:58

## 2022-05-28 RX ADMIN — Medication 650 MILLIGRAM(S): at 17:12

## 2022-05-28 RX ADMIN — SODIUM CHLORIDE 4 MILLILITER(S): 9 INJECTION INTRAMUSCULAR; INTRAVENOUS; SUBCUTANEOUS at 03:22

## 2022-05-28 RX ADMIN — TRAMADOL HYDROCHLORIDE 25 MILLIGRAM(S): 50 TABLET ORAL at 07:29

## 2022-05-28 RX ADMIN — TRAMADOL HYDROCHLORIDE 25 MILLIGRAM(S): 50 TABLET ORAL at 15:15

## 2022-05-28 RX ADMIN — Medication 0.5 MILLIGRAM(S): at 09:11

## 2022-05-28 RX ADMIN — TRAMADOL HYDROCHLORIDE 50 MILLIGRAM(S): 50 TABLET ORAL at 03:39

## 2022-05-28 RX ADMIN — HYDROMORPHONE HYDROCHLORIDE 0.5 MILLIGRAM(S): 2 INJECTION INTRAMUSCULAR; INTRAVENOUS; SUBCUTANEOUS at 21:48

## 2022-05-28 RX ADMIN — SENNA PLUS 2 TABLET(S): 8.6 TABLET ORAL at 21:58

## 2022-05-28 RX ADMIN — DORNASE ALFA 2.5 MILLIGRAM(S): 1 SOLUTION RESPIRATORY (INHALATION) at 09:09

## 2022-05-28 RX ADMIN — Medication 500 MICROGRAM(S): at 14:10

## 2022-05-28 RX ADMIN — SODIUM CHLORIDE 4 MILLILITER(S): 9 INJECTION INTRAMUSCULAR; INTRAVENOUS; SUBCUTANEOUS at 09:10

## 2022-05-28 RX ADMIN — HEPARIN SODIUM 5000 UNIT(S): 5000 INJECTION INTRAVENOUS; SUBCUTANEOUS at 06:33

## 2022-05-28 RX ADMIN — Medication 650 MILLIGRAM(S): at 06:32

## 2022-05-28 RX ADMIN — LEVALBUTEROL 0.63 MILLIGRAM(S): 1.25 SOLUTION, CONCENTRATE RESPIRATORY (INHALATION) at 09:10

## 2022-05-28 RX ADMIN — Medication 500 MICROGRAM(S): at 21:11

## 2022-05-28 RX ADMIN — SODIUM CHLORIDE 4 MILLILITER(S): 9 INJECTION INTRAMUSCULAR; INTRAVENOUS; SUBCUTANEOUS at 21:11

## 2022-05-28 RX ADMIN — LEVALBUTEROL 0.63 MILLIGRAM(S): 1.25 SOLUTION, CONCENTRATE RESPIRATORY (INHALATION) at 03:21

## 2022-05-28 RX ADMIN — HEPARIN SODIUM 5000 UNIT(S): 5000 INJECTION INTRAVENOUS; SUBCUTANEOUS at 21:58

## 2022-05-28 RX ADMIN — Medication 0.5 MILLIGRAM(S): at 21:11

## 2022-05-28 NOTE — PROGRESS NOTE ADULT - SUBJECTIVE AND OBJECTIVE BOX
DEMI CHACKO                     MRN-[id number]    HPI:  Pt is a 67 yr old female scheduled for Flexible Bronchoscopy Left Video Assisted Thoracoscopy Left Upper Lobectomy Poss Lingular Sparing with Dr Villegas tentatively 5/25/22 - pt former smoker and had CT scan where pulmonary nodule was noted - pt denies pain or cough - some mild SOB with exertion   Patient instructed to contact surgeon's office concerning COVID test prior to surgery    (19 May 2022 10:00)    Procedure:  Uniportal Left VATS TRISTAN Lobectomy 5/25/2022Uniportal Left VATS TRISTAN Lobectomy 5/25/2022                       Issues:              Lung nodule              Postop pain              Chest tube in place  COPD, severe  Hx of kidney stones s/p R ureter stent (5/2018)   Hx of shingles  Former smoker    PAST MEDICAL & SURGICAL HISTORY:  Emphysema lung      Shingles      Renal calculi      Pulmonary nodule      Former smoker      History of laparoscopic cholecystectomy  1995      S/P cystoscopy  with right renal stent insertion 5/8/18      Elective surgery  hernia repair 2014 - with mesh                VITAL SIGNS:  Vital Signs Last 24 Hrs  T(C): 36.3 (28 May 2022 08:00), Max: 37.1 (28 May 2022 00:00)  T(F): 97.4 (28 May 2022 08:00), Max: 98.8 (28 May 2022 00:00)  HR: 91 (28 May 2022 09:50) (71 - 100)  BP: 102/66 (28 May 2022 09:00) (101/63 - 139/76)  BP(mean): 79 (28 May 2022 09:00) (76 - 97)  RR: 13 (28 May 2022 09:00) (11 - 22)  SpO2: 95% (28 May 2022 09:50) (83% - 99%)    I/Os:   I&O's Detail    27 May 2022 07:01  -  28 May 2022 07:00  --------------------------------------------------------  IN:    Lactated Ringers: 60 mL    Oral Fluid: 200 mL  Total IN: 260 mL    OUT:    Chest Tube (mL): 0 mL    Voided (mL): 1400 mL  Total OUT: 1400 mL    Total NET: -1140 mL      28 May 2022 07:01  -  28 May 2022 09:59  --------------------------------------------------------  IN:  Total IN: 0 mL    OUT:    Chest Tube (mL): 30 mL  Total OUT: 30 mL    Total NET: -30 mL          CAPILLARY BLOOD GLUCOSE          =======================MEDICATIONS===================  MEDICATIONS  (STANDING):  acetaminophen     Tablet .. 650 milliGRAM(s) Oral every 6 hours  acetaminophen   IVPB .. 900 milliGRAM(s) IV Intermittent once  buDESOnide    Inhalation Suspension 0.5 milliGRAM(s) Inhalation every 12 hours  dornase marco Solution 2.5 milliGRAM(s) Inhalation daily  heparin   Injectable 5000 Unit(s) SubCutaneous every 8 hours  ipratropium    for Nebulization 500 MICROGram(s) Nebulizer every 6 hours  levalbuterol Inhalation 0.63 milliGRAM(s) Inhalation every 6 hours  methylPREDNISolone sodium succinate Injectable 40 milliGRAM(s) IV Push every 8 hours  methylPREDNISolone sodium succinate Injectable 40 milliGRAM(s) IV Push every 12 hours  senna 2 Tablet(s) Oral at bedtime  sodium chloride 3%  Inhalation 4 milliLiter(s) Inhalation every 6 hours    MEDICATIONS  (PRN):  HYDROmorphone  Injectable 0.5 milliGRAM(s) IV Push every 2 hours PRN Breakthrough pain  naloxone Injectable 0.1 milliGRAM(s) IV Push every 3 minutes PRN For ANY of the following changes in patient status:  A. RR LESS THAN 10 breaths per minute, B. Oxygen saturation LESS THAN 90%, C. Sedation score of 6  ondansetron Injectable 4 milliGRAM(s) IV Push every 6 hours PRN Nausea  traMADol 25 milliGRAM(s) Oral every 4 hours PRN Moderate Pain (4 - 6)  traMADol 50 milliGRAM(s) Oral every 4 hours PRN Severe Pain (7 - 10)      PHYSICAL EXAM============================  General:                         Awake, alert, not in any distress  Neuro:                            Moving all extremities to commands.   Respiratory:	Air entry fair and  bilateral conducted sounds                                           Effort even and unlabored.  CV:		Regular rate and rhythm. Normal S1/S2                                          Distal pulses present.  Abdomen:	                     Soft, non-distended. Bowel sounds present   Skin:		No rash.  Extremities:	Warm, no cyanosis or edema.  Palpable pulses    ============================LABS=========================                        13.3   8.53  )-----------( 185      ( 28 May 2022 04:16 )             39.7     05-28    140  |  102  |  13  ----------------------------<  167<H>  3.9   |  28  |  0.37<L>    Ca    9.3      28 May 2022 04:16  Phos  2.4     05-28  Mg     2.00     05-28          ABG - ( 27 May 2022 17:23 )  pH, Arterial: 7.40  pH, Blood: x     /  pCO2: 49    /  pO2: 83    / HCO3: 30    / Base Excess: 4.5   /  SaO2: 98.2        A/P:  67yFemale s/p Uniportal Left VATS TRISTAN Lobectomy 5/25/2022Uniportal Left VATS TRISTAN Lobectomy 5/25/2022, experiencing  pain with deep breathing.                             Neuro:    Nonfocal                                        Pain control with PCA /  Tylenol PRN                            Cardiovascular:                                          Telemetry (medical test) - Reviewed by me today independently. Normal sinus rhythm.                                          Continue hemodynamic monitoring to prevent decompensation.                            Respiratory:                                         Postop hypoxemia requiring O2 via nasal cannula probably due to underlying severe COPD,  postop pain - pt desaturated to low 80,s on O2 while ambulating, will need home o2                                              Encourage incentive spirometry. Wean off high flow  Pt will need home 02                                                  Chest PT and frequent suctioning.     Severe COPD: Continue bronchodilators, Pulmozyme and inhaled 3% saline inhalations.                                                       OOB to chair & ambulate w/ assistance.                                                           Continuous pulse oximetry for support & to prevent decompensation.                                          Monitor chest tube output                                         Chest tube to  water seal                                                                                            GI                                         On  regular diet as tolerated                                         Continue Zofran / Reglan for nausea - PRN                                         Continue bowel regimen	                                                                 Renal:                                         Continue LR  30cc/hr                                         Monitor I/Os and electrolytes                                                                                        Hem/ Onc:                                         DVT prophylaxis with SQ Heparin and SCDs                                         Monitor chest tube output &  signs of bleeding.                                          Follow CBC in AM                           Infectious disease:                                            Monitor for fever / leukocytosis.                                          All surgical incision / chest tube  sites look clean                            Endocrine                                             Continue Accu-Checks with coverage                                                  Pertinent clinical, laboratory, radiographic, hemodynamic, echocardiographic, respiratory data, microbiologic data and chart were reviewed and analyzed frequently throughout the course of the day and night.     Patient seen, examined and plan discussed with CT Surgeon Dr. Villegas / CTICU team during rounds.    OOB to chair and ambulate as tolerated.     Status discussed with patient and updated plan of care.     I have spent 35 minutes with this patient including 20 minutes of time coordinating care in the ICU..      Teri JURADOP

## 2022-05-29 LAB
ANION GAP SERPL CALC-SCNC: 9 MMOL/L — SIGNIFICANT CHANGE UP (ref 7–14)
BUN SERPL-MCNC: 19 MG/DL — SIGNIFICANT CHANGE UP (ref 7–23)
CALCIUM SERPL-MCNC: 9.4 MG/DL — SIGNIFICANT CHANGE UP (ref 8.4–10.5)
CHLORIDE SERPL-SCNC: 102 MMOL/L — SIGNIFICANT CHANGE UP (ref 98–107)
CO2 SERPL-SCNC: 29 MMOL/L — SIGNIFICANT CHANGE UP (ref 22–31)
CREAT SERPL-MCNC: 0.44 MG/DL — LOW (ref 0.5–1.3)
EGFR: 107 ML/MIN/1.73M2 — SIGNIFICANT CHANGE UP
GLUCOSE SERPL-MCNC: 111 MG/DL — HIGH (ref 70–99)
HCT VFR BLD CALC: 39.2 % — SIGNIFICANT CHANGE UP (ref 34.5–45)
HGB BLD-MCNC: 13 G/DL — SIGNIFICANT CHANGE UP (ref 11.5–15.5)
MAGNESIUM SERPL-MCNC: 2.2 MG/DL — SIGNIFICANT CHANGE UP (ref 1.6–2.6)
MCHC RBC-ENTMCNC: 29.9 PG — SIGNIFICANT CHANGE UP (ref 27–34)
MCHC RBC-ENTMCNC: 33.2 GM/DL — SIGNIFICANT CHANGE UP (ref 32–36)
MCV RBC AUTO: 90.1 FL — SIGNIFICANT CHANGE UP (ref 80–100)
NRBC # BLD: 0 /100 WBCS — SIGNIFICANT CHANGE UP
NRBC # FLD: 0 K/UL — SIGNIFICANT CHANGE UP
PHOSPHATE SERPL-MCNC: 2.8 MG/DL — SIGNIFICANT CHANGE UP (ref 2.5–4.5)
PLATELET # BLD AUTO: 212 K/UL — SIGNIFICANT CHANGE UP (ref 150–400)
POTASSIUM SERPL-MCNC: 3.8 MMOL/L — SIGNIFICANT CHANGE UP (ref 3.5–5.3)
POTASSIUM SERPL-SCNC: 3.8 MMOL/L — SIGNIFICANT CHANGE UP (ref 3.5–5.3)
RBC # BLD: 4.35 M/UL — SIGNIFICANT CHANGE UP (ref 3.8–5.2)
RBC # FLD: 12 % — SIGNIFICANT CHANGE UP (ref 10.3–14.5)
SODIUM SERPL-SCNC: 140 MMOL/L — SIGNIFICANT CHANGE UP (ref 135–145)
WBC # BLD: 9.57 K/UL — SIGNIFICANT CHANGE UP (ref 3.8–10.5)
WBC # FLD AUTO: 9.57 K/UL — SIGNIFICANT CHANGE UP (ref 3.8–10.5)

## 2022-05-29 PROCEDURE — 71045 X-RAY EXAM CHEST 1 VIEW: CPT | Mod: 26

## 2022-05-29 PROCEDURE — 99233 SBSQ HOSP IP/OBS HIGH 50: CPT

## 2022-05-29 RX ORDER — POLYETHYLENE GLYCOL 3350 17 G/17G
17 POWDER, FOR SOLUTION ORAL
Refills: 0 | Status: DISCONTINUED | OUTPATIENT
Start: 2022-05-29 | End: 2022-06-08

## 2022-05-29 RX ORDER — HYDROMORPHONE HYDROCHLORIDE 2 MG/ML
0.25 INJECTION INTRAMUSCULAR; INTRAVENOUS; SUBCUTANEOUS EVERY 6 HOURS
Refills: 0 | Status: DISCONTINUED | OUTPATIENT
Start: 2022-05-29 | End: 2022-06-04

## 2022-05-29 RX ORDER — POTASSIUM CHLORIDE 20 MEQ
20 PACKET (EA) ORAL ONCE
Refills: 0 | Status: COMPLETED | OUTPATIENT
Start: 2022-05-29 | End: 2022-05-29

## 2022-05-29 RX ADMIN — LEVALBUTEROL 0.63 MILLIGRAM(S): 1.25 SOLUTION, CONCENTRATE RESPIRATORY (INHALATION) at 15:48

## 2022-05-29 RX ADMIN — TRAMADOL HYDROCHLORIDE 50 MILLIGRAM(S): 50 TABLET ORAL at 03:43

## 2022-05-29 RX ADMIN — TRAMADOL HYDROCHLORIDE 50 MILLIGRAM(S): 50 TABLET ORAL at 13:59

## 2022-05-29 RX ADMIN — TRAMADOL HYDROCHLORIDE 50 MILLIGRAM(S): 50 TABLET ORAL at 17:33

## 2022-05-29 RX ADMIN — Medication 0.5 MILLIGRAM(S): at 10:23

## 2022-05-29 RX ADMIN — Medication 650 MILLIGRAM(S): at 03:43

## 2022-05-29 RX ADMIN — Medication 650 MILLIGRAM(S): at 17:28

## 2022-05-29 RX ADMIN — Medication 500 MICROGRAM(S): at 04:22

## 2022-05-29 RX ADMIN — Medication 0.5 MILLIGRAM(S): at 21:31

## 2022-05-29 RX ADMIN — SODIUM CHLORIDE 4 MILLILITER(S): 9 INJECTION INTRAMUSCULAR; INTRAVENOUS; SUBCUTANEOUS at 15:49

## 2022-05-29 RX ADMIN — Medication 650 MILLIGRAM(S): at 09:28

## 2022-05-29 RX ADMIN — LEVALBUTEROL 0.63 MILLIGRAM(S): 1.25 SOLUTION, CONCENTRATE RESPIRATORY (INHALATION) at 10:22

## 2022-05-29 RX ADMIN — LEVALBUTEROL 0.63 MILLIGRAM(S): 1.25 SOLUTION, CONCENTRATE RESPIRATORY (INHALATION) at 04:22

## 2022-05-29 RX ADMIN — TRAMADOL HYDROCHLORIDE 25 MILLIGRAM(S): 50 TABLET ORAL at 18:57

## 2022-05-29 RX ADMIN — Medication 20 MILLIEQUIVALENT(S): at 08:55

## 2022-05-29 RX ADMIN — SODIUM CHLORIDE 4 MILLILITER(S): 9 INJECTION INTRAMUSCULAR; INTRAVENOUS; SUBCUTANEOUS at 21:34

## 2022-05-29 RX ADMIN — TRAMADOL HYDROCHLORIDE 50 MILLIGRAM(S): 50 TABLET ORAL at 08:56

## 2022-05-29 RX ADMIN — LEVALBUTEROL 0.63 MILLIGRAM(S): 1.25 SOLUTION, CONCENTRATE RESPIRATORY (INHALATION) at 21:32

## 2022-05-29 RX ADMIN — Medication 500 MICROGRAM(S): at 21:31

## 2022-05-29 RX ADMIN — HEPARIN SODIUM 5000 UNIT(S): 5000 INJECTION INTRAVENOUS; SUBCUTANEOUS at 05:46

## 2022-05-29 RX ADMIN — HEPARIN SODIUM 5000 UNIT(S): 5000 INJECTION INTRAVENOUS; SUBCUTANEOUS at 13:03

## 2022-05-29 RX ADMIN — POLYETHYLENE GLYCOL 3350 17 GRAM(S): 17 POWDER, FOR SOLUTION ORAL at 17:40

## 2022-05-29 RX ADMIN — TRAMADOL HYDROCHLORIDE 50 MILLIGRAM(S): 50 TABLET ORAL at 04:30

## 2022-05-29 RX ADMIN — SENNA PLUS 2 TABLET(S): 8.6 TABLET ORAL at 21:09

## 2022-05-29 RX ADMIN — DORNASE ALFA 2.5 MILLIGRAM(S): 1 SOLUTION RESPIRATORY (INHALATION) at 10:23

## 2022-05-29 RX ADMIN — HEPARIN SODIUM 5000 UNIT(S): 5000 INJECTION INTRAVENOUS; SUBCUTANEOUS at 21:09

## 2022-05-29 RX ADMIN — Medication 650 MILLIGRAM(S): at 22:45

## 2022-05-29 RX ADMIN — Medication 500 MICROGRAM(S): at 10:22

## 2022-05-29 RX ADMIN — SODIUM CHLORIDE 4 MILLILITER(S): 9 INJECTION INTRAMUSCULAR; INTRAVENOUS; SUBCUTANEOUS at 04:23

## 2022-05-29 RX ADMIN — Medication 650 MILLIGRAM(S): at 04:30

## 2022-05-29 RX ADMIN — Medication 40 MILLIGRAM(S): at 09:29

## 2022-05-29 RX ADMIN — Medication 650 MILLIGRAM(S): at 23:15

## 2022-05-29 RX ADMIN — SODIUM CHLORIDE 4 MILLILITER(S): 9 INJECTION INTRAMUSCULAR; INTRAVENOUS; SUBCUTANEOUS at 10:24

## 2022-05-29 NOTE — PROGRESS NOTE ADULT - SUBJECTIVE AND OBJECTIVE BOX
CHIEF COMPLAINT: FOLLOW UP IN ICU FOR POSTOPERATIVE CARE OF PATIENT WHO IS S/P  LVATS, TRISTAN lobectomy       PROCEDURES:       Uniportal Left VATS TRISTAN Lobectomy 5/25/2022Uniportal Left VATS TRISTAN Lobectomy 5/25/2022            ISSUES:                  Lung nodule                  Postop pain                  Chest tube in place  COPD, severe  LLL atelectasis  Hx of kidney stones s/p R ureter stent (5/2018)   Hx of shingles              Former smoker         INTERVAL EVENTS:      Chest tube to suction, noted to have airleak. 180ml output  Postop LLL atelectasis, improving with chest PT. Initially required 50 to 60% fio2 via facemask. Now on 4l NC spo2 94%, requiring 6l/min O2 via NC during ambulation  Afebrile, MAPs in 80's without pressors.         HISTORY:      Patient reports moderate pain at chest wall incision sites which is worse with coughing and deep breathing without associated fever or dyspnea. Pain is improved with use of pain meds.           PHYSICAL EXAM:      Gen: Comfortable, No acute distress     Eyes: Sclera white, Conjunctiva normal, Eyelids normal, Pupils symmetrical      ENT: Mucous membranes moist,  ,  ,       Neck: Trachea midline,  ,  ,  ,  ,  ,       CV: Rate regular, Rhythm regular,  ,  ,       Resp: Breath sounds clear, No accessory muscles use, L chest tube in place,  ,       Abd: Soft, Non-distended, Non-tender, Bowel sounds normal,  ,  ,       Skin: Warm, No peripheral edema of lower extremities,  ,       : No newell     Neuro: Moving all 4 extremities,       Psych: A&Ox3               ASSESSMENT AND PLAN:           NEURO:     Post-operative Pain - Stable. Pain control with tramadol and Tylenol IV PRN.           RESPIRATORY:   Hypoxia - Wean nasal cannula for goal O2sat above 92. Obtain CXR. Incentive spirometry. Chest PT and frequent suctioning. Continue bronchodilators. OOB to chair & ambulate w/ assistance. Continuous pulse oximetry for support & to prevent decompensation.   LLL atelectasis improving.  Chest tube – Pleurevac regulated suctioning. Monitor chest tube output.            CARDIOVASCULAR:     Hemodynamically stable - Not on pressors. Continue hemodynamic monitoring.     Telemetry (medical test) - Reviewed by me today independently. Normal sinus rhythm.           RENAL:     Stable - Monitor IOs and electrolytes. Keep K above 4.0 and Mg above 2.0.           GASTROINTESTINAL:     GI prophylaxis not indicated     Zofran and Reglan IV PRN for nausea     Regular consistency diet          HEMATOLOGIC:     No signs of active bleeding. Monitor Hgb in CBC in AM     DVT prophylaxis with heparin subQ and SCDs.         INFECTIOUS DISEASE:     All surgical sites appear clean. No signs of active infection. Will monitor for fever and leukocytosis.         ENDOCRINE:     Stable – Monitor glucose fingersticks for goal 120-180.           ONCOLOGY:     Lung nodule - Improved. S/P resection. Follow up final pathology.       Pertinent clinical, laboratory, radiographic, hemodynamic, echocardiographic, respiratory data, microbiologic data and chart were reviewed by myself and analyzed frequently throughout the course of the day and night by myself.     Plan discussed at length with the CTICU staff and Attending CT Surgeon -   Dr. Nikita Villegas    Patient's status was discussed with patient at bedside.     _________________________  VITAL SIGNS:  Vital Signs Last 24 Hrs  T(C): 36.7 (29 May 2022 08:00), Max: 37.5 (29 May 2022 00:00)  T(F): 98 (29 May 2022 08:00), Max: 99.5 (29 May 2022 00:00)  HR: 76 (29 May 2022 11:00) (63 - 120)  BP: 93/59 (29 May 2022 11:00) (92/54 - 136/84)  BP(mean): 70 (29 May 2022 11:00) (66 - 105)  RR: 18 (29 May 2022 11:00) (12 - 24)  SpO2: 92% (29 May 2022 11:00) (90% - 98%)  I/Os:   I&O's Detail    28 May 2022 07:01  -  29 May 2022 07:00  --------------------------------------------------------  IN:    Oral Fluid: 840 mL  Total IN: 840 mL    OUT:    Chest Tube (mL): 140 mL    Voided (mL): 1500 mL  Total OUT: 1640 mL    Total NET: -800 mL      29 May 2022 07:01  -  29 May 2022 11:50  --------------------------------------------------------  IN:    Oral Fluid: 480 mL  Total IN: 480 mL    OUT:    Chest Tube (mL): 40 mL    Voided (mL): 400 mL  Total OUT: 440 mL    Total NET: 40 mL              MEDICATIONS:  MEDICATIONS  (STANDING):  acetaminophen     Tablet .. 650 milliGRAM(s) Oral every 6 hours  acetaminophen   IVPB .. 900 milliGRAM(s) IV Intermittent once  buDESOnide    Inhalation Suspension 0.5 milliGRAM(s) Inhalation every 12 hours  dornase marco Solution 2.5 milliGRAM(s) Inhalation daily  heparin   Injectable 5000 Unit(s) SubCutaneous every 8 hours  ipratropium    for Nebulization 500 MICROGram(s) Nebulizer every 6 hours  levalbuterol Inhalation 0.63 milliGRAM(s) Inhalation every 6 hours  senna 2 Tablet(s) Oral at bedtime  sodium chloride 3%  Inhalation 4 milliLiter(s) Inhalation every 6 hours    MEDICATIONS  (PRN):  HYDROmorphone  Injectable 0.25 milliGRAM(s) IV Push every 6 hours PRN Severe Pain (7 - 10)  naloxone Injectable 0.1 milliGRAM(s) IV Push every 3 minutes PRN For ANY of the following changes in patient status:  A. RR LESS THAN 10 breaths per minute, B. Oxygen saturation LESS THAN 90%, C. Sedation score of 6  ondansetron Injectable 4 milliGRAM(s) IV Push every 6 hours PRN Nausea  traMADol 25 milliGRAM(s) Oral every 4 hours PRN Moderate Pain (4 - 6)  traMADol 50 milliGRAM(s) Oral every 4 hours PRN Severe Pain (7 - 10)      LABS:  Laboratory data was independently reviewed by me today.                           13.0   9.57  )-----------( 212      ( 29 May 2022 05:12 )             39.2     05-29    140  |  102  |  19  ----------------------------<  111<H>  3.8   |  29  |  0.44<L>    Ca    9.4      29 May 2022 05:12  Phos  2.8     05-29  Mg     2.20     05-29          ABG - ( 27 May 2022 17:23 )  pH, Arterial: 7.40  pH, Blood: x     /  pCO2: 49    /  pO2: 83    / HCO3: 30    / Base Excess: 4.5   /  SaO2: 98.2                  RADIOLOGY:   Radiology images were independently reviewed by me today. Reports were reviewed by me today.    Xray Chest 1 View- PORTABLE-Urgent:   ACC: [id number] EXAM:  XR CHEST PORTABLE URGENT 1V                          PROCEDURE DATE:  05/28/2022          INTERPRETATION:  INDICATION: S/P mucus plug removal.    COMPARISON: Earlier 5/28/22 exam.    Technique: AP radiograph of the chest.    FINDINGS:  Left-sided chest tube in place.  Heart/Vascular: Heart is not enlarged.  Pulmonary: Mild decrease in left pleural effusion. Persistent left   basilar atelectatic changes. Linear atelectases at right lung base.   Remainder of the lungs are clear. No appreciable pneumothorax.  Bones: No acute bony finding.    Impression:  Mild decrease in left pleural effusion.        --- End of Report ---            JUAN C WARE MD; Attending Radiologist  This document has been electronically signed. May28 2022  2:04PM (05-28-22 @ 08:57)  Xray Chest 1 View- PORTABLE-Routine:   ACC: [id number] EXAM:  XR CHEST PORTABLE ROUTINE 1V                          PROCEDURE DATE:  05/28/2022          INTERPRETATION:  INDICATION: S/P TRISTAN lobectomy.    COMPARISON: 5/27/22    Technique: AP radiograph of the chest.    FINDINGS:  Left-sided chest tube in place.  Heart/Vascular: Heart is not enlarged.  Pulmonary: Interval increase in left pleural effusion - with associated   left basilar atelectasis. Tiny left apical pneumothorax is seen.   Persistent linear atelectases at right lung base.  Bones: No acute bony finding.    Impression:  Left chest tube in place.  Tiny left apical pneumothorax is present.  Interval increase in left pleural effusion.        --- End of Report ---            JUAN C WARE MD; Attending Radiologist  Thisdocument has been electronically signed. May 28 2022  2:43PM (05-28-22 @ 06:16)  Xray Chest 1 View- PORTABLE-Routine:   ACC: [id number] EXAM:  XR CHEST PORTABLE ROUTINE 1V                          PROCEDURE DATE:  05/27/2022          INTERPRETATION:  TIME OF EXAM: May 2017, 2022 at 5:16 AM    CLINICAL INFORMATION: Follow-up post left thoracotomy.    TECHNIQUE:   Portable chest    INTERPRETATION:    Left-sided chest tube in place. Loss of volume in the left lung. The   lungs are clear aside from linear atelectasis at the right lung base. The   heart is not enlarged. No pneumothorax.      COMPARISON:  May 26      IMPRESSION:  Follow-up left thoracotomy with chest tube.    --- End of Report ---            EDIL RAMIRES MD; Attending Radiologist  This document has been electronically signed. May 27 2022  1:54PM (05-27-22 @ 06:48)                           ________________________________________________

## 2022-05-30 LAB
ANION GAP SERPL CALC-SCNC: 9 MMOL/L — SIGNIFICANT CHANGE UP (ref 7–14)
BUN SERPL-MCNC: 26 MG/DL — HIGH (ref 7–23)
CALCIUM SERPL-MCNC: 9.5 MG/DL — SIGNIFICANT CHANGE UP (ref 8.4–10.5)
CHLORIDE SERPL-SCNC: 103 MMOL/L — SIGNIFICANT CHANGE UP (ref 98–107)
CO2 SERPL-SCNC: 32 MMOL/L — HIGH (ref 22–31)
CREAT SERPL-MCNC: 0.56 MG/DL — SIGNIFICANT CHANGE UP (ref 0.5–1.3)
EGFR: 101 ML/MIN/1.73M2 — SIGNIFICANT CHANGE UP
GLUCOSE SERPL-MCNC: 92 MG/DL — SIGNIFICANT CHANGE UP (ref 70–99)
HCT VFR BLD CALC: 44 % — SIGNIFICANT CHANGE UP (ref 34.5–45)
HGB BLD-MCNC: 13.9 G/DL — SIGNIFICANT CHANGE UP (ref 11.5–15.5)
MAGNESIUM SERPL-MCNC: 2.2 MG/DL — SIGNIFICANT CHANGE UP (ref 1.6–2.6)
MCHC RBC-ENTMCNC: 30.3 PG — SIGNIFICANT CHANGE UP (ref 27–34)
MCHC RBC-ENTMCNC: 31.6 GM/DL — LOW (ref 32–36)
MCV RBC AUTO: 96.1 FL — SIGNIFICANT CHANGE UP (ref 80–100)
NRBC # BLD: 0 /100 WBCS — SIGNIFICANT CHANGE UP
NRBC # FLD: 0 K/UL — SIGNIFICANT CHANGE UP
PHOSPHATE SERPL-MCNC: 3.2 MG/DL — SIGNIFICANT CHANGE UP (ref 2.5–4.5)
PLATELET # BLD AUTO: 236 K/UL — SIGNIFICANT CHANGE UP (ref 150–400)
POTASSIUM SERPL-MCNC: 4.1 MMOL/L — SIGNIFICANT CHANGE UP (ref 3.5–5.3)
POTASSIUM SERPL-SCNC: 4.1 MMOL/L — SIGNIFICANT CHANGE UP (ref 3.5–5.3)
RBC # BLD: 4.58 M/UL — SIGNIFICANT CHANGE UP (ref 3.8–5.2)
RBC # FLD: 12 % — SIGNIFICANT CHANGE UP (ref 10.3–14.5)
SODIUM SERPL-SCNC: 144 MMOL/L — SIGNIFICANT CHANGE UP (ref 135–145)
WBC # BLD: 8.24 K/UL — SIGNIFICANT CHANGE UP (ref 3.8–10.5)
WBC # FLD AUTO: 8.24 K/UL — SIGNIFICANT CHANGE UP (ref 3.8–10.5)

## 2022-05-30 PROCEDURE — 71045 X-RAY EXAM CHEST 1 VIEW: CPT | Mod: 26

## 2022-05-30 PROCEDURE — 99233 SBSQ HOSP IP/OBS HIGH 50: CPT

## 2022-05-30 RX ORDER — ENOXAPARIN SODIUM 100 MG/ML
40 INJECTION SUBCUTANEOUS
Qty: 12 | Refills: 0
Start: 2022-05-30 | End: 2022-06-28

## 2022-05-30 RX ADMIN — SODIUM CHLORIDE 4 MILLILITER(S): 9 INJECTION INTRAMUSCULAR; INTRAVENOUS; SUBCUTANEOUS at 10:33

## 2022-05-30 RX ADMIN — Medication 650 MILLIGRAM(S): at 05:45

## 2022-05-30 RX ADMIN — TRAMADOL HYDROCHLORIDE 50 MILLIGRAM(S): 50 TABLET ORAL at 18:41

## 2022-05-30 RX ADMIN — SODIUM CHLORIDE 4 MILLILITER(S): 9 INJECTION INTRAMUSCULAR; INTRAVENOUS; SUBCUTANEOUS at 16:12

## 2022-05-30 RX ADMIN — TRAMADOL HYDROCHLORIDE 50 MILLIGRAM(S): 50 TABLET ORAL at 11:51

## 2022-05-30 RX ADMIN — Medication 5 MILLIGRAM(S): at 08:47

## 2022-05-30 RX ADMIN — TRAMADOL HYDROCHLORIDE 25 MILLIGRAM(S): 50 TABLET ORAL at 08:57

## 2022-05-30 RX ADMIN — POLYETHYLENE GLYCOL 3350 17 GRAM(S): 17 POWDER, FOR SOLUTION ORAL at 06:06

## 2022-05-30 RX ADMIN — Medication 650 MILLIGRAM(S): at 11:12

## 2022-05-30 RX ADMIN — Medication 500 MICROGRAM(S): at 16:12

## 2022-05-30 RX ADMIN — Medication 650 MILLIGRAM(S): at 06:30

## 2022-05-30 RX ADMIN — TRAMADOL HYDROCHLORIDE 50 MILLIGRAM(S): 50 TABLET ORAL at 05:45

## 2022-05-30 RX ADMIN — Medication 650 MILLIGRAM(S): at 17:43

## 2022-05-30 RX ADMIN — Medication 650 MILLIGRAM(S): at 23:18

## 2022-05-30 RX ADMIN — Medication 0.5 MILLIGRAM(S): at 10:33

## 2022-05-30 RX ADMIN — TRAMADOL HYDROCHLORIDE 50 MILLIGRAM(S): 50 TABLET ORAL at 06:30

## 2022-05-30 RX ADMIN — Medication 650 MILLIGRAM(S): at 17:13

## 2022-05-30 RX ADMIN — SENNA PLUS 2 TABLET(S): 8.6 TABLET ORAL at 22:04

## 2022-05-30 RX ADMIN — DORNASE ALFA 2.5 MILLIGRAM(S): 1 SOLUTION RESPIRATORY (INHALATION) at 10:34

## 2022-05-30 RX ADMIN — LEVALBUTEROL 0.63 MILLIGRAM(S): 1.25 SOLUTION, CONCENTRATE RESPIRATORY (INHALATION) at 10:35

## 2022-05-30 RX ADMIN — Medication 650 MILLIGRAM(S): at 23:50

## 2022-05-30 RX ADMIN — HEPARIN SODIUM 5000 UNIT(S): 5000 INJECTION INTRAVENOUS; SUBCUTANEOUS at 14:05

## 2022-05-30 RX ADMIN — Medication 24 MILLIGRAM(S): at 11:23

## 2022-05-30 RX ADMIN — Medication 500 MICROGRAM(S): at 10:33

## 2022-05-30 RX ADMIN — HEPARIN SODIUM 5000 UNIT(S): 5000 INJECTION INTRAVENOUS; SUBCUTANEOUS at 22:03

## 2022-05-30 RX ADMIN — HEPARIN SODIUM 5000 UNIT(S): 5000 INJECTION INTRAVENOUS; SUBCUTANEOUS at 06:06

## 2022-05-30 RX ADMIN — POLYETHYLENE GLYCOL 3350 17 GRAM(S): 17 POWDER, FOR SOLUTION ORAL at 17:13

## 2022-05-30 RX ADMIN — TRAMADOL HYDROCHLORIDE 50 MILLIGRAM(S): 50 TABLET ORAL at 11:53

## 2022-05-30 RX ADMIN — LEVALBUTEROL 0.63 MILLIGRAM(S): 1.25 SOLUTION, CONCENTRATE RESPIRATORY (INHALATION) at 16:12

## 2022-05-30 NOTE — PROGRESS NOTE ADULT - SUBJECTIVE AND OBJECTIVE BOX
DEMI CHACKO                     [id number]    HPI:  Pt is a 67 yr old female scheduled for Flexible Bronchoscopy Left Video Assisted Thoracoscopy Left Upper Lobectomy Poss Lingular Sparing with Dr Villegas tentatively 5/25/22 - pt former smoker and had CT scan where pulmonary nodule was noted - pt denies pain or cough - some mild SOB with exertion   Patient instructed to contact surgeon's office concerning COVID test prior to surgery    (19 May 2022 10:00)     PROCEDURES:       Uniportal Left VATS TRISTAN Lobectomy 5/25/2022Uniportal Left VATS TRISTAN Lobectomy 5/25/2022         ISSUES:                  Lung nodule                  Postop pain                  Chest tube in place  COPD, severe  LLL atelectasis  Hx of kidney stones s/p R ureter stent (5/2018)   Hx of shingles              Former smoker      PAST MEDICAL & SURGICAL HISTORY:  Emphysema lung      Shingles      Renal calculi      Pulmonary nodule      Former smoker      History of laparoscopic cholecystectomy  1995      S/P cystoscopy  with right renal stent insertion 5/8/18      Elective surgery  hernia repair 2014 - with mesh                VITAL SIGNS:  Vital Signs Last 24 Hrs  T(C): 36.6 (30 May 2022 08:00), Max: 36.9 (29 May 2022 16:00)  T(F): 97.8 (30 May 2022 08:00), Max: 98.5 (29 May 2022 16:00)  HR: 75 (30 May 2022 11:00) (64 - 119)  BP: 116/68 (30 May 2022 11:00) (97/69 - 125/94)  BP(mean): 82 (30 May 2022 11:00) (77 - 103)  RR: 11 (30 May 2022 11:00) (11 - 25)  SpO2: 96% (30 May 2022 11:00) (84% - 97%)    I/Os:   I&O's Detail    29 May 2022 07:01  -  30 May 2022 07:00  --------------------------------------------------------  IN:    Oral Fluid: 1300 mL  Total IN: 1300 mL    OUT:    Chest Tube (mL): 90 mL    Voided (mL): 2850 mL  Total OUT: 2940 mL    Total NET: -1640 mL      30 May 2022 07:01  -  30 May 2022 11:37  --------------------------------------------------------  IN:    Oral Fluid: 240 mL  Total IN: 240 mL    OUT:    Chest Tube (mL): 50 mL    Voided (mL): 400 mL  Total OUT: 450 mL    Total NET: -210 mL          CAPILLARY BLOOD GLUCOSE          =======================MEDICATIONS===================  MEDICATIONS  (STANDING):  acetaminophen     Tablet .. 650 milliGRAM(s) Oral every 6 hours  acetaminophen   IVPB .. 900 milliGRAM(s) IV Intermittent once  bisacodyl 5 milliGRAM(s) Oral daily  buDESOnide    Inhalation Suspension 0.5 milliGRAM(s) Inhalation every 12 hours  dornase marco Solution 2.5 milliGRAM(s) Inhalation daily  heparin   Injectable 5000 Unit(s) SubCutaneous every 8 hours  ipratropium    for Nebulization 500 MICROGram(s) Nebulizer every 6 hours  levalbuterol Inhalation 0.63 milliGRAM(s) Inhalation every 6 hours  methylPREDNISolone   Oral   polyethylene glycol 3350 17 Gram(s) Oral two times a day  senna 2 Tablet(s) Oral at bedtime  sodium chloride 3%  Inhalation 4 milliLiter(s) Inhalation every 6 hours    MEDICATIONS  (PRN):  HYDROmorphone  Injectable 0.25 milliGRAM(s) IV Push every 6 hours PRN Severe Pain (7 - 10)  naloxone Injectable 0.1 milliGRAM(s) IV Push every 3 minutes PRN For ANY of the following changes in patient status:  A. RR LESS THAN 10 breaths per minute, B. Oxygen saturation LESS THAN 90%, C. Sedation score of 6  ondansetron Injectable 4 milliGRAM(s) IV Push every 6 hours PRN Nausea  traMADol 25 milliGRAM(s) Oral every 4 hours PRN Moderate Pain (4 - 6)  traMADol 50 milliGRAM(s) Oral every 4 hours PRN Severe Pain (7 - 10)        PHYSICAL EXAM============================  General:                         Awake, alert, not in any distress  Neuro:                            Moving all extremities to commands.   Respiratory:	Air entry fair and  bilateral conducted sounds                                           Effort even and unlabored.  CV:		Regular rate and rhythm. Normal S1/S2                                          Distal pulses present.  Abdomen:	                     Soft, non-distended. Bowel sounds present   Skin:		No rash.  Extremities:	Warm, no cyanosis or edema.  Palpable pulses    ============================LABS=========================                        13.9   8.24  )-----------( 236      ( 30 May 2022 06:00 )             44.0     05-30    144  |  103  |  26<H>  ----------------------------<  92  4.1   |  32<H>  |  0.56    Ca    9.5      30 May 2022 06:00  Phos  3.2     05-30  Mg     2.20     05-30        A/P:  67yFemale s/p Uniportal Left VATS TRISTAN Lobectomy 5/25/2022Uniportal Left VATS TRISTAN Lobectomy 5/25/2022, experiencing  pain with deep breathing.                             Neuro:    Nonfocal                                        Pain control with PCA /  Tylenol PRN                            Cardiovascular:                                          Telemetry (medical test) - Reviewed by me today independently. Normal sinus rhythm.                                          Continue hemodynamic monitoring to prevent decompensation.                            Respiratory:                                         Postop hypoxemia requiring O2 via nasal cannula probably due to underlying severe COPD,  postop pain - pt desaturated to low 80,s on O2 while ambulating, will need home o2                                              Encourage incentive spirometry.   Pt will need home 02                                                  Chest PT and frequent suctioning.     Severe COPD: Continue bronchodilators, Pulmozyme and inhaled 3% saline inhalations.                                                       OOB to chair & ambulate w/ assistance.                                                           Continuous pulse oximetry for support & to prevent decompensation.                                          Monitor chest tube output                                                                                         GI                                         On  regular diet as tolerated                                         Continue Zofran / Reglan for nausea - PRN                                         Continue bowel regimen	                                                                 Renal:                                                                                Monitor I/Os and electrolytes                                                                                        Hem/ Onc:                                         DVT prophylaxis with SQ Heparin and SCDs                                         Monitor chest tube output &  signs of bleeding.                                          Follow CBC in AM                           Infectious disease:                                            Monitor for fever / leukocytosis.                                          All surgical incision / chest tube  sites look clean                            Endocrine                                             Continue Accu-Checks with coverage                                                  Pertinent clinical, laboratory, radiographic, hemodynamic, echocardiographic, respiratory data, microbiologic data and chart were reviewed and analyzed frequently throughout the course of the day and night.     Patient seen, examined and plan discussed with CT Surgeon Dr. Villegas / CTICU team during rounds.    OOB to chair and ambulate as tolerated.     Status discussed with patient and updated plan of care.     I have spent 35 minutes with this patient including 20 minutes of time coordinating care in the ICU..    Teri JURADOP

## 2022-05-31 ENCOUNTER — TRANSCRIPTION ENCOUNTER (OUTPATIENT)
Age: 67
End: 2022-05-31

## 2022-05-31 LAB
ANION GAP SERPL CALC-SCNC: 12 MMOL/L — SIGNIFICANT CHANGE UP (ref 7–14)
BUN SERPL-MCNC: 15 MG/DL — SIGNIFICANT CHANGE UP (ref 7–23)
CALCIUM SERPL-MCNC: 9.7 MG/DL — SIGNIFICANT CHANGE UP (ref 8.4–10.5)
CHLORIDE SERPL-SCNC: 101 MMOL/L — SIGNIFICANT CHANGE UP (ref 98–107)
CO2 SERPL-SCNC: 28 MMOL/L — SIGNIFICANT CHANGE UP (ref 22–31)
CREAT SERPL-MCNC: 0.49 MG/DL — LOW (ref 0.5–1.3)
EGFR: 104 ML/MIN/1.73M2 — SIGNIFICANT CHANGE UP
GLUCOSE SERPL-MCNC: 96 MG/DL — SIGNIFICANT CHANGE UP (ref 70–99)
HCT VFR BLD CALC: 41.4 % — SIGNIFICANT CHANGE UP (ref 34.5–45)
HGB BLD-MCNC: 13.5 G/DL — SIGNIFICANT CHANGE UP (ref 11.5–15.5)
MAGNESIUM SERPL-MCNC: 2.2 MG/DL — SIGNIFICANT CHANGE UP (ref 1.6–2.6)
MCHC RBC-ENTMCNC: 29.3 PG — SIGNIFICANT CHANGE UP (ref 27–34)
MCHC RBC-ENTMCNC: 32.6 GM/DL — SIGNIFICANT CHANGE UP (ref 32–36)
MCV RBC AUTO: 89.8 FL — SIGNIFICANT CHANGE UP (ref 80–100)
NRBC # BLD: 0 /100 WBCS — SIGNIFICANT CHANGE UP
NRBC # FLD: 0 K/UL — SIGNIFICANT CHANGE UP
PHOSPHATE SERPL-MCNC: 3.1 MG/DL — SIGNIFICANT CHANGE UP (ref 2.5–4.5)
PLATELET # BLD AUTO: 259 K/UL — SIGNIFICANT CHANGE UP (ref 150–400)
POTASSIUM SERPL-MCNC: 4.1 MMOL/L — SIGNIFICANT CHANGE UP (ref 3.5–5.3)
POTASSIUM SERPL-SCNC: 4.1 MMOL/L — SIGNIFICANT CHANGE UP (ref 3.5–5.3)
RBC # BLD: 4.61 M/UL — SIGNIFICANT CHANGE UP (ref 3.8–5.2)
RBC # FLD: 12.1 % — SIGNIFICANT CHANGE UP (ref 10.3–14.5)
SODIUM SERPL-SCNC: 141 MMOL/L — SIGNIFICANT CHANGE UP (ref 135–145)
WBC # BLD: 7.6 K/UL — SIGNIFICANT CHANGE UP (ref 3.8–10.5)
WBC # FLD AUTO: 7.6 K/UL — SIGNIFICANT CHANGE UP (ref 3.8–10.5)

## 2022-05-31 PROCEDURE — 71045 X-RAY EXAM CHEST 1 VIEW: CPT | Mod: 26

## 2022-05-31 RX ORDER — FAMOTIDINE 10 MG/ML
20 INJECTION INTRAVENOUS
Refills: 0 | Status: DISCONTINUED | OUTPATIENT
Start: 2022-05-31 | End: 2022-06-08

## 2022-05-31 RX ORDER — LIDOCAINE 4 G/100G
1 CREAM TOPICAL DAILY
Refills: 0 | Status: DISCONTINUED | OUTPATIENT
Start: 2022-05-31 | End: 2022-06-08

## 2022-05-31 RX ADMIN — Medication 4 MILLIGRAM(S): at 19:39

## 2022-05-31 RX ADMIN — Medication 4 MILLIGRAM(S): at 06:43

## 2022-05-31 RX ADMIN — FAMOTIDINE 20 MILLIGRAM(S): 10 INJECTION INTRAVENOUS at 04:58

## 2022-05-31 RX ADMIN — DORNASE ALFA 2.5 MILLIGRAM(S): 1 SOLUTION RESPIRATORY (INHALATION) at 10:59

## 2022-05-31 RX ADMIN — SENNA PLUS 2 TABLET(S): 8.6 TABLET ORAL at 22:03

## 2022-05-31 RX ADMIN — Medication 650 MILLIGRAM(S): at 18:28

## 2022-05-31 RX ADMIN — SODIUM CHLORIDE 4 MILLILITER(S): 9 INJECTION INTRAMUSCULAR; INTRAVENOUS; SUBCUTANEOUS at 16:16

## 2022-05-31 RX ADMIN — TRAMADOL HYDROCHLORIDE 50 MILLIGRAM(S): 50 TABLET ORAL at 05:15

## 2022-05-31 RX ADMIN — TRAMADOL HYDROCHLORIDE 50 MILLIGRAM(S): 50 TABLET ORAL at 19:39

## 2022-05-31 RX ADMIN — Medication 650 MILLIGRAM(S): at 18:11

## 2022-05-31 RX ADMIN — HEPARIN SODIUM 5000 UNIT(S): 5000 INJECTION INTRAVENOUS; SUBCUTANEOUS at 04:59

## 2022-05-31 RX ADMIN — LIDOCAINE 1 PATCH: 4 CREAM TOPICAL at 20:30

## 2022-05-31 RX ADMIN — Medication 0.5 MILLIGRAM(S): at 10:44

## 2022-05-31 RX ADMIN — TRAMADOL HYDROCHLORIDE 50 MILLIGRAM(S): 50 TABLET ORAL at 20:16

## 2022-05-31 RX ADMIN — Medication 0.5 MILLIGRAM(S): at 03:30

## 2022-05-31 RX ADMIN — HEPARIN SODIUM 5000 UNIT(S): 5000 INJECTION INTRAVENOUS; SUBCUTANEOUS at 22:03

## 2022-05-31 RX ADMIN — SODIUM CHLORIDE 4 MILLILITER(S): 9 INJECTION INTRAMUSCULAR; INTRAVENOUS; SUBCUTANEOUS at 03:30

## 2022-05-31 RX ADMIN — Medication 650 MILLIGRAM(S): at 04:58

## 2022-05-31 RX ADMIN — Medication 4 MILLIGRAM(S): at 14:21

## 2022-05-31 RX ADMIN — Medication 30 MILLILITER(S): at 04:31

## 2022-05-31 RX ADMIN — Medication 500 MICROGRAM(S): at 10:44

## 2022-05-31 RX ADMIN — FAMOTIDINE 20 MILLIGRAM(S): 10 INJECTION INTRAVENOUS at 19:39

## 2022-05-31 RX ADMIN — Medication 500 MICROGRAM(S): at 16:16

## 2022-05-31 RX ADMIN — Medication 650 MILLIGRAM(S): at 12:37

## 2022-05-31 RX ADMIN — LEVALBUTEROL 0.63 MILLIGRAM(S): 1.25 SOLUTION, CONCENTRATE RESPIRATORY (INHALATION) at 16:16

## 2022-05-31 RX ADMIN — SODIUM CHLORIDE 4 MILLILITER(S): 9 INJECTION INTRAMUSCULAR; INTRAVENOUS; SUBCUTANEOUS at 10:45

## 2022-05-31 RX ADMIN — Medication 650 MILLIGRAM(S): at 11:42

## 2022-05-31 RX ADMIN — HEPARIN SODIUM 5000 UNIT(S): 5000 INJECTION INTRAVENOUS; SUBCUTANEOUS at 14:22

## 2022-05-31 RX ADMIN — Medication 500 MICROGRAM(S): at 03:30

## 2022-05-31 RX ADMIN — Medication 8 MILLIGRAM(S): at 22:03

## 2022-05-31 RX ADMIN — Medication 650 MILLIGRAM(S): at 05:15

## 2022-05-31 RX ADMIN — TRAMADOL HYDROCHLORIDE 50 MILLIGRAM(S): 50 TABLET ORAL at 04:56

## 2022-05-31 NOTE — PROGRESS NOTE ADULT - SUBJECTIVE AND OBJECTIVE BOX
Subjective: "I hope I'm getting better" Pt states breathing feels less labored. Now on 3L NC. OOB w assist in hallway. Using IS to 500. NO new CP or SOB.     Vital Signs:  Vital Signs Last 24 Hrs  T(C): 36.6 (05-31-22 @ 11:31), Max: 36.8 (05-30-22 @ 20:46)  T(F): 97.9 (05-31-22 @ 11:31), Max: 98.2 (05-30-22 @ 20:46)  HR: 97 (05-31-22 @ 11:31) (68 - 108)  BP: 103/62 (05-31-22 @ 08:43) (103/62 - 123/67)  RR: 18 (05-31-22 @ 11:31) (15 - 19)  SpO2: 96% (05-31-22 @ 11:31) (93% - 96%) on (O2)    Telemetry/Alarms:  General: WN/WD NAD  Neurology: Awake, nonfocal, HUSAIN x 4  Eyes: Scleras clear, PERRLA/ EOMI, Gross vision intact  ENT:Gross hearing intact, grossly patent pharynx, no stridor  Neck: Neck supple, trachea midline, No JVD,   Respiratory: dec BS bilat throughout.   CV: RRR, S1S2, no murmurs, rubs or gallops  Abdominal: Soft, NT, ND +BS, +BM this am. Voiding.   Extremities: No edema, + peripheral pulses  Skin: No Rashes, Hematoma, Ecchymosis  Lymphatic: No Neck, axilla, groin LAD  Psych: Oriented x 3, normal affect  Incisions: rt VATS c/d/i.   Tubes: Rt CT- 120cc/24hrs on WS since 12mn. +expir AL x 2 chambers.   Relevant labs, radiology and Medications reviewed           CXR this am off suction showing stable sml apical ptx.              13.5   7.60  )-----------( 259      ( 31 May 2022 07:05 )             41.4     05-31    141  |  101  |  15  ----------------------------<  96  4.1   |  28  |  0.49<L>    Ca    9.7      31 May 2022 07:05  Phos  3.1     05-31  Mg     2.20     05-31        MEDICATIONS  (STANDING):  acetaminophen     Tablet .. 650 milliGRAM(s) Oral every 6 hours  acetaminophen   IVPB .. 900 milliGRAM(s) IV Intermittent once  bisacodyl 5 milliGRAM(s) Oral daily  buDESOnide    Inhalation Suspension 0.5 milliGRAM(s) Inhalation every 12 hours  dornase marco Solution 2.5 milliGRAM(s) Inhalation daily  famotidine    Tablet 20 milliGRAM(s) Oral two times a day  heparin   Injectable 5000 Unit(s) SubCutaneous every 8 hours  ipratropium    for Nebulization 500 MICROGram(s) Nebulizer every 6 hours  levalbuterol Inhalation 0.63 milliGRAM(s) Inhalation every 6 hours  lidocaine   4% Patch 1 Patch Transdermal daily  methylPREDNISolone   Oral   methylPREDNISolone 4 milliGRAM(s) Oral before breakfast  methylPREDNISolone 4 milliGRAM(s) Oral after lunch  methylPREDNISolone 4 milliGRAM(s) Oral after dinner  methylPREDNISolone 8 milliGRAM(s) Oral at bedtime  polyethylene glycol 3350 17 Gram(s) Oral two times a day  senna 2 Tablet(s) Oral at bedtime  sodium chloride 3%  Inhalation 4 milliLiter(s) Inhalation every 6 hours    MEDICATIONS  (PRN):  HYDROmorphone  Injectable 0.25 milliGRAM(s) IV Push every 6 hours PRN Severe Pain (7 - 10)  naloxone Injectable 0.1 milliGRAM(s) IV Push every 3 minutes PRN For ANY of the following changes in patient status:  A. RR LESS THAN 10 breaths per minute, B. Oxygen saturation LESS THAN 90%, C. Sedation score of 6  ondansetron Injectable 4 milliGRAM(s) IV Push every 6 hours PRN Nausea  traMADol 50 milliGRAM(s) Oral every 4 hours PRN Severe Pain (7 - 10)  traMADol 25 milliGRAM(s) Oral every 4 hours PRN Moderate Pain (4 - 6)    Pertinent Physical Exam  I&O's Summary    30 May 2022 07:01  -  31 May 2022 07:00  --------------------------------------------------------  IN: 480 mL / OUT: 1620 mL / NET: -1140 mL    31 May 2022 07:01  -  31 May 2022 16:13  --------------------------------------------------------  IN: 0 mL / OUT: 770 mL / NET: -770 mL        Assessment  67y Female  w/ PAST MEDICAL & SURGICAL HISTORY:  Emphysema lung      Shingles      Renal calculi      Pulmonary nodule      Former smoker      History of laparoscopic cholecystectomy  1995      S/P cystoscopy  with right renal stent insertion 5/8/18      Elective surgery  hernia repair 2014 - with mesh      admitted with complaints of Patient is a 67y old  Female who presents with a chief complaint of Pulmonary nodule (31 May 2022 05:28)  HPI:  Pt is a 67 yr old female scheduled for Flexible Bronchoscopy Left Video Assisted Thoracoscopy Left Upper Lobectomy Poss Lingular Sparing with Dr Villegas tentatively 5/25/22 - pt former smoker and had CT scan where pulmonary nodule was noted - pt denies pain or cough - some mild SOB with exertion   Patient instructed to contact surgeon's office concerning COVID test prior to surgery    (19 May 2022 10:00)     PROCEDURES:       Uniportal Left VATS TRISTAN Lobectomy 5/25/2022 Uniportal Left VATS TRISTAN Lobectomy 5/25/2022         ISSUES:                  Lung nodule                  Postop pain                  Chest tube in place  COPD, severe  LLL atelectasis  Hx of kidney stones s/p R ureter stent (5/2018)   Hx of shingles              Former smoker  Post op pt with significant hypoxemia. SOB. Also large air leak in Chest tube. 5/30 pt improving. Remaining on bronchodilators. Steriods changed to Medrol dose pack. 5/31-CT to waterseal last night, CT stable. Still has expir AL. O2 requirement down to 3LNC.     PLAN  Neuro: Pain management  Pulm: Encourage coughing, deep breathing and use of incentive spirometry. Wean off supplemental oxygen as able. Daily CXR. Will need home O2. cont steroids.   Cardio: Monitor telemetry/alarms  GI: Tolerating diet. Continue stool softeners.  Renal: monitor urine output, supplement electrolytes as needed  Vasc: Heparin SC/SCDs for DVT prophylaxis  Heme: Stable H/H. .   ID: Off antibiotics. Stable.  Therapy: OOB/ambulate  Tubes: Monitor Chest tube output and air leak.   Disposition: Aim to D/C to home once CT removed  Discussed with Cardiothoracic Team at AM rounds.

## 2022-05-31 NOTE — DISCHARGE NOTE PROVIDER - NSDCFUADDINST_GEN_ALL_CORE_FT
Keep the chest tube site dressings in place for two days and then remove them so that you can shower.  Wash with soap and water and leave open to air as much as possible.  Some drainage is normal but watch for pus, increased redness, fevers, or worsening difficulty breathing.   If noted, call Dr Villegas. Please walk 4-5 x per day, Increase as tolerated. You may climb stairs. Continue to use incentive spirometer.   You may uncover the chest tube dressing in 2 days. Please shower daily. The suture will be removed in office at follow up apt.   See Dr. Villegas's office in 2 weeks. Please call [phone] for an apt. Please get an CXR the day before your appointment and bring the CD with you to your follow up appointment.  Take pain pills only as needed. Please take a laxative to help support your bowel movements while on pain medication. Laxatives can be available over the counter at your local pharmacy.  Please call the office at [phone] if you have fever's chills, worsening shortness of breath, chest pain, warmth, redness or purulent discharge from the insertion site.  Please walk 4-5 x per day, Increase as tolerated. You may climb stairs. Continue to use incentive spirometer.   You may uncover the chest tube dressing tomorrow.  Please shower daily. The suture will be removed in office at follow up apt.   See Dr. Villegas's office in 2 weeks. Please call [phone] for an apt. Please get an xray the day before your appointment and bring the CD with you to your follow up appointment.  Take pain pills only as needed. Please take a laxative to help support your bowel movements while on pain medication. Laxatives can be available over the counter at your local pharmacy.  Please call the office at [phone] if you have fever's chills, worsening shortness of breath, chest pain, warmth, redness or purulent discharge from the insertion site.

## 2022-05-31 NOTE — DISCHARGE NOTE PROVIDER - NSDCCPTREATMENT_GEN_ALL_CORE_FT
PRINCIPAL PROCEDURE  Procedure: VATS, with lobectomy  Findings and Treatment: Uniportal Left VATS      SECONDARY PROCEDURE  Procedure: Biopsy, lymph node, mediastinum, thoracoscopic  Findings and Treatment:

## 2022-05-31 NOTE — DISCHARGE NOTE PROVIDER - NSDCFUADDAPPT_GEN_ALL_CORE_FT
See Dr Villegas in 2 weeks- call for an appointment and bring a new chest X-ray with you when you come.   See Dr Villegas in 2 weeks- call for an appointment [phone] and bring a new chest X-ray with you when you come.    See your PCP/Pulmonologist in 2-3 weeks. Call for an apt.

## 2022-05-31 NOTE — CHART NOTE - NSCHARTNOTEFT_GEN_A_CORE
As per Dr. Villegas, pt given Blood patch at bedside this evening. 50cc of pt's own blood obtained in syringe and under aseptic technique then given intra pleural via left Chest tube. Flushed with 15cc sterile NS. Pleuravac elevated at bedside with bedrest x 2hrs. Pt tolerated procedure well.

## 2022-05-31 NOTE — DISCHARGE NOTE PROVIDER - HOSPITAL COURSE
This 67 yr old female former smoker had some minor SOB with exertion and was found to have a pulmonary nodule on CT scan.  She underwent a Flexible Bronchoscopy Uniportal Left VATS, Left Upper Lobectomy, MLND on 5/25/22.   She desaturated post-op and required nebulizers and IV steroids.  That was changed to a Medrol dose pack as she improved.  An air leak delayed chest tube removal and the pt's subsequent discharge home/ This 67 yr old female former smoker had some minor SOB with exertion and was found to have a pulmonary nodule on CT scan.  She underwent a Flexible Bronchoscopy Uniportal Left VATS, Left Upper Lobectomy, MLND on 5/25/22. Post op course was c/b desaturations in the ICU requiring nebulizers and IV steroids and oxygen therapy. Pt also had a prolonged Air Leak which improved after bedside bleomycin pleurodesis on 6/4/22. Pts CT was removed on 6/7/22 and was cleared for DC with home oxygen and follow up with Dr. Villegas in 2 weeks. This 67 yr old female former smoker had some minor SOB with exertion and was found to have a pulmonary nodule on CT scan.  She underwent a Flexible Bronchoscopy Uniportal Left VATS, Left Upper Lobectomy, MLND on 5/25/22. Post op course was c/b desaturations in the ICU requiring nebulizers and IV steroids and oxygen therapy. Pt also had a prolonged Air Leak which improved after bedside bleomycin pleurodesis on 6/4/22. Pts CT was removed on 6/7/22 and was cleared for DC with home oxygen and follow up with Dr. Villegas in 2 weeks.    Vital Signs Last 24 Hrs  T(C): 36.6 (07 Jun 2022 07:40), Max: 37.1 (06 Jun 2022 21:39)  T(F): 97.9 (07 Jun 2022 07:40), Max: 98.8 (06 Jun 2022 21:39)  HR: 104 (07 Jun 2022 10:11) (81 - 110)  BP: 102/77 (07 Jun 2022 07:40) (94/67 - 106/67)  BP(mean): --  RR: 17 (07 Jun 2022 07:40) (17 - 18)  SpO2: 99% (07 Jun 2022 10:11) (93% - 99%) This 67 yr old female former smoker had some minor SOB with exertion and was found to have a pulmonary nodule on CT scan.  She underwent a Flexible Bronchoscopy Uniportal Left VATS, Left Upper Lobectomy, MLND on 5/25/22. Post op course was c/b desaturations in the ICU requiring nebulizers and IV steroids and oxygen therapy. Pt also had a prolonged Air Leak which improved after bedside bleomycin pleurodesis on 6/4/22. Pts CT was removed on 6/7/22 and FU CXR showed sml increase in left PTX. Rpt CXR done later in the day was stable. Pt still awaiting delivery of home O2. Today, bedside Oxygen delivered.  Concentrator to be delivered to home this afternoon. Today CXR showed sml ptx stable. Pt feels well.Amb ad matt. Left VATS c/d/i. Lungs dec'd bilat but CTA.   Cleared for home by Dr Villegas  Vital Signs Last 24 Hrs  T(C): 36.6 (08 Jun 2022 12:30), Max: 36.8 (07 Jun 2022 17:10)  T(F): 97.9 (08 Jun 2022 12:30), Max: 98.2 (07 Jun 2022 17:10)  HR: 89 (08 Jun 2022 12:30) (89 - 115)  BP: 92/60 (08 Jun 2022 12:30) (92/60 - 107/68)  BP(mean): 3 (07 Jun 2022 20:05) (3 - 3)  RR: 20 (08 Jun 2022 12:30) (17 - 22)  SpO2: 90% (08 Jun 2022 12:30) (87% - 98%)

## 2022-06-01 PROCEDURE — 71045 X-RAY EXAM CHEST 1 VIEW: CPT | Mod: 26,77

## 2022-06-01 PROCEDURE — 71045 X-RAY EXAM CHEST 1 VIEW: CPT | Mod: 26

## 2022-06-01 RX ADMIN — HEPARIN SODIUM 5000 UNIT(S): 5000 INJECTION INTRAVENOUS; SUBCUTANEOUS at 14:45

## 2022-06-01 RX ADMIN — Medication 500 MICROGRAM(S): at 15:28

## 2022-06-01 RX ADMIN — Medication 500 MICROGRAM(S): at 21:22

## 2022-06-01 RX ADMIN — SENNA PLUS 2 TABLET(S): 8.6 TABLET ORAL at 21:48

## 2022-06-01 RX ADMIN — TRAMADOL HYDROCHLORIDE 25 MILLIGRAM(S): 50 TABLET ORAL at 11:05

## 2022-06-01 RX ADMIN — LEVALBUTEROL 0.63 MILLIGRAM(S): 1.25 SOLUTION, CONCENTRATE RESPIRATORY (INHALATION) at 04:55

## 2022-06-01 RX ADMIN — TRAMADOL HYDROCHLORIDE 50 MILLIGRAM(S): 50 TABLET ORAL at 17:29

## 2022-06-01 RX ADMIN — Medication 650 MILLIGRAM(S): at 15:16

## 2022-06-01 RX ADMIN — Medication 650 MILLIGRAM(S): at 14:45

## 2022-06-01 RX ADMIN — LIDOCAINE 1 PATCH: 4 CREAM TOPICAL at 23:48

## 2022-06-01 RX ADMIN — Medication 4 MILLIGRAM(S): at 14:45

## 2022-06-01 RX ADMIN — LIDOCAINE 1 PATCH: 4 CREAM TOPICAL at 20:16

## 2022-06-01 RX ADMIN — SODIUM CHLORIDE 4 MILLILITER(S): 9 INJECTION INTRAMUSCULAR; INTRAVENOUS; SUBCUTANEOUS at 21:24

## 2022-06-01 RX ADMIN — FAMOTIDINE 20 MILLIGRAM(S): 10 INJECTION INTRAVENOUS at 17:18

## 2022-06-01 RX ADMIN — HEPARIN SODIUM 5000 UNIT(S): 5000 INJECTION INTRAVENOUS; SUBCUTANEOUS at 05:29

## 2022-06-01 RX ADMIN — Medication 650 MILLIGRAM(S): at 22:49

## 2022-06-01 RX ADMIN — LEVALBUTEROL 0.63 MILLIGRAM(S): 1.25 SOLUTION, CONCENTRATE RESPIRATORY (INHALATION) at 15:28

## 2022-06-01 RX ADMIN — TRAMADOL HYDROCHLORIDE 50 MILLIGRAM(S): 50 TABLET ORAL at 21:48

## 2022-06-01 RX ADMIN — TRAMADOL HYDROCHLORIDE 50 MILLIGRAM(S): 50 TABLET ORAL at 22:49

## 2022-06-01 RX ADMIN — LIDOCAINE 1 PATCH: 4 CREAM TOPICAL at 11:05

## 2022-06-01 RX ADMIN — Medication 4 MILLIGRAM(S): at 05:30

## 2022-06-01 RX ADMIN — Medication 0.5 MILLIGRAM(S): at 08:28

## 2022-06-01 RX ADMIN — Medication 500 MICROGRAM(S): at 04:55

## 2022-06-01 RX ADMIN — Medication 500 MICROGRAM(S): at 08:28

## 2022-06-01 RX ADMIN — LIDOCAINE 1 PATCH: 4 CREAM TOPICAL at 07:00

## 2022-06-01 RX ADMIN — Medication 4 MILLIGRAM(S): at 21:47

## 2022-06-01 RX ADMIN — SODIUM CHLORIDE 4 MILLILITER(S): 9 INJECTION INTRAMUSCULAR; INTRAVENOUS; SUBCUTANEOUS at 15:28

## 2022-06-01 RX ADMIN — LIDOCAINE 1 PATCH: 4 CREAM TOPICAL at 08:45

## 2022-06-01 RX ADMIN — Medication 650 MILLIGRAM(S): at 06:09

## 2022-06-01 RX ADMIN — DORNASE ALFA 2.5 MILLIGRAM(S): 1 SOLUTION RESPIRATORY (INHALATION) at 08:28

## 2022-06-01 RX ADMIN — Medication 0.5 MILLIGRAM(S): at 21:22

## 2022-06-01 RX ADMIN — LEVALBUTEROL 0.63 MILLIGRAM(S): 1.25 SOLUTION, CONCENTRATE RESPIRATORY (INHALATION) at 08:27

## 2022-06-01 RX ADMIN — TRAMADOL HYDROCHLORIDE 25 MILLIGRAM(S): 50 TABLET ORAL at 11:36

## 2022-06-01 RX ADMIN — SODIUM CHLORIDE 4 MILLILITER(S): 9 INJECTION INTRAMUSCULAR; INTRAVENOUS; SUBCUTANEOUS at 04:55

## 2022-06-01 RX ADMIN — Medication 4 MILLIGRAM(S): at 17:20

## 2022-06-01 RX ADMIN — SODIUM CHLORIDE 4 MILLILITER(S): 9 INJECTION INTRAMUSCULAR; INTRAVENOUS; SUBCUTANEOUS at 08:28

## 2022-06-01 RX ADMIN — LEVALBUTEROL 0.63 MILLIGRAM(S): 1.25 SOLUTION, CONCENTRATE RESPIRATORY (INHALATION) at 21:22

## 2022-06-01 RX ADMIN — TRAMADOL HYDROCHLORIDE 50 MILLIGRAM(S): 50 TABLET ORAL at 05:29

## 2022-06-01 RX ADMIN — FAMOTIDINE 20 MILLIGRAM(S): 10 INJECTION INTRAVENOUS at 05:29

## 2022-06-01 RX ADMIN — HEPARIN SODIUM 5000 UNIT(S): 5000 INJECTION INTRAVENOUS; SUBCUTANEOUS at 21:47

## 2022-06-01 RX ADMIN — Medication 650 MILLIGRAM(S): at 21:47

## 2022-06-01 RX ADMIN — Medication 650 MILLIGRAM(S): at 05:29

## 2022-06-01 NOTE — PROGRESS NOTE ADULT - SUBJECTIVE AND OBJECTIVE BOX
Subjective: "My back pain is a little better" Pt states continued improvement with SOB. Using IS to 1000. S/p Blood patch yesterday. Pt still noted w AL and CXR today shows worsened Ptx. No CP or SOB. Wearing O2.     Vital Signs:  Vital Signs Last 24 Hrs  T(C): 36.3 (06-01-22 @ 09:00), Max: 36.8 (05-31-22 @ 19:58)  T(F): 97.4 (06-01-22 @ 09:00), Max: 98.3 (05-31-22 @ 19:58)  HR: 105 (06-01-22 @ 09:00) (66 - 105)  BP: 109/65 (06-01-22 @ 09:00) (101/51 - 119/71)  RR: 18 (06-01-22 @ 09:00) (17 - 18)  SpO2: 97% (06-01-22 @ 09:00) (95% - 98%) on (O2)    Telemetry/Alarms:  General: WN/WD NAD  Neurology: Awake, nonfocal, HUSAIN x 4  Eyes: Scleras clear, PERRLA/ EOMI, Gross vision intact  ENT:Gross hearing intact, grossly patent pharynx, no stridor  Neck: Neck supple, trachea midline, No JVD,   Respiratory: dec BS bilat throughout.   CV: RRR, S1S2, no murmurs, rubs or gallops  Abdominal: Soft, NT, ND +BS, +BM yesterday  Extremities: No edema, + peripheral pulses  Skin: No Rashes, Hematoma, Ecchymosis  Lymphatic: No Neck, axilla, groin LAD  Psych: Oriented x 3, normal affect  Incisions: left VATS c/d/i.   Tubes: Left CT 50cc/24hrs on WS, + expir 2 chamber AL  Relevant labs, radiology and Medications reviewed        CXR increase in left ptx.                 13.5   7.60  )-----------( 259      ( 31 May 2022 07:05 )             41.4     05-31    141  |  101  |  15  ----------------------------<  96  4.1   |  28  |  0.49<L>    Ca    9.7      31 May 2022 07:05  Phos  3.1     05-31  Mg     2.20     05-31        MEDICATIONS  (STANDING):  acetaminophen     Tablet .. 650 milliGRAM(s) Oral every 6 hours  acetaminophen   IVPB .. 900 milliGRAM(s) IV Intermittent once  bisacodyl 5 milliGRAM(s) Oral daily  buDESOnide    Inhalation Suspension 0.5 milliGRAM(s) Inhalation every 12 hours  dornase marco Solution 2.5 milliGRAM(s) Inhalation daily  famotidine    Tablet 20 milliGRAM(s) Oral two times a day  heparin   Injectable 5000 Unit(s) SubCutaneous every 8 hours  ipratropium    for Nebulization 500 MICROGram(s) Nebulizer every 6 hours  levalbuterol Inhalation 0.63 milliGRAM(s) Inhalation every 6 hours  lidocaine   4% Patch 1 Patch Transdermal daily  methylPREDNISolone 4 milliGRAM(s) Oral before breakfast  methylPREDNISolone 4 milliGRAM(s) Oral after lunch  methylPREDNISolone 4 milliGRAM(s) Oral after dinner  methylPREDNISolone 4 milliGRAM(s) Oral at bedtime  methylPREDNISolone   Oral   polyethylene glycol 3350 17 Gram(s) Oral two times a day  senna 2 Tablet(s) Oral at bedtime  sodium chloride 3%  Inhalation 4 milliLiter(s) Inhalation every 6 hours    MEDICATIONS  (PRN):  HYDROmorphone  Injectable 0.25 milliGRAM(s) IV Push every 6 hours PRN Severe Pain (7 - 10)  naloxone Injectable 0.1 milliGRAM(s) IV Push every 3 minutes PRN For ANY of the following changes in patient status:  A. RR LESS THAN 10 breaths per minute, B. Oxygen saturation LESS THAN 90%, C. Sedation score of 6  ondansetron Injectable 4 milliGRAM(s) IV Push every 6 hours PRN Nausea  traMADol 25 milliGRAM(s) Oral every 4 hours PRN Moderate Pain (4 - 6)  traMADol 50 milliGRAM(s) Oral every 4 hours PRN Severe Pain (7 - 10)    Pertinent Physical Exam  I&O's Summary    31 May 2022 07:01  -  01 Jun 2022 07:00  --------------------------------------------------------  IN: 0 mL / OUT: 1150 mL / NET: -1150 mL          Assessment  67y Female  w/ PAST MEDICAL & SURGICAL HISTORY:  Emphysema lung      Shingles      Renal calculi      Pulmonary nodule      Former smoker      History of laparoscopic cholecystectomy  1995      S/P cystoscopy  with right renal stent insertion 5/8/18      Elective surgery  hernia repair 2014 - with mesh      admitted with complaints of Patient is a 67y old  Female who presents with a chief complaint of LUng surgery (31 May 2022 16:12)  HPI:  Pt is a 67 yr old female scheduled for Flexible Bronchoscopy Left Video Assisted Thoracoscopy Left Upper Lobectomy Poss Lingular Sparing with Dr Villegas tentatively 5/25/22 - pt former smoker and had CT scan where pulmonary nodule was noted - pt denies pain or cough - some mild SOB with exertion   Patient instructed to contact surgeon's office concerning COVID test prior to surgery    (19 May 2022 10:00)     PROCEDURES:       Uniportal Left VATS TRISTAN Lobectomy 5/25/2022 Uniportal Left VATS TRISTAN Lobectomy 5/25/2022         ISSUES:                  Lung nodule                  Postop pain                  Chest tube in place  COPD, severe  LLL atelectasis  Hx of kidney stones s/p R ureter stent (5/2018)   Hx of shingles              Former smoker  Post op pt with significant hypoxemia. SOB. Also large air leak in Chest tube. 5/30 pt improving. Remaining on bronchodilators. Steriods changed to Medrol dose pack. 5/31-CT to waterseal last night, CT stable. Still has expir AL. O2 requirement down to 3LNC. Blood patch done. 6/1- PTX noted to be worse. CT back to -10cm suction.     PLAN  Neuro: Pain management  Pulm: Encourage coughing, deep breathing and use of incentive spirometry. Cont O2 Daily CXR.   Cardio: Monitor telemetry/alarms  GI: Tolerating diet. Continue stool softeners.  Renal: monitor urine output, supplement electrolytes as needed  Vasc: Heparin SC/SCDs for DVT prophylaxis  Heme: Stable H/H. .   ID: Off antibiotics. Stable.  Therapy: OOB/ambulate  Tubes: Monitor Chest tube output and air leak. Will place back to -10cm suction and rpt CXR later today  Disposition: Aim to D/C to home once can tolerate waterseal or CT removed. May need [facility]  Discussed with Cardiothoracic Team at AM rounds.

## 2022-06-02 PROCEDURE — 71045 X-RAY EXAM CHEST 1 VIEW: CPT | Mod: 26

## 2022-06-02 RX ADMIN — SODIUM CHLORIDE 4 MILLILITER(S): 9 INJECTION INTRAMUSCULAR; INTRAVENOUS; SUBCUTANEOUS at 03:38

## 2022-06-02 RX ADMIN — SENNA PLUS 2 TABLET(S): 8.6 TABLET ORAL at 22:05

## 2022-06-02 RX ADMIN — LIDOCAINE 1 PATCH: 4 CREAM TOPICAL at 19:12

## 2022-06-02 RX ADMIN — TRAMADOL HYDROCHLORIDE 50 MILLIGRAM(S): 50 TABLET ORAL at 22:04

## 2022-06-02 RX ADMIN — Medication 4 MILLIGRAM(S): at 06:41

## 2022-06-02 RX ADMIN — TRAMADOL HYDROCHLORIDE 50 MILLIGRAM(S): 50 TABLET ORAL at 17:00

## 2022-06-02 RX ADMIN — LIDOCAINE 1 PATCH: 4 CREAM TOPICAL at 22:27

## 2022-06-02 RX ADMIN — Medication 4 MILLIGRAM(S): at 15:11

## 2022-06-02 RX ADMIN — SODIUM CHLORIDE 4 MILLILITER(S): 9 INJECTION INTRAMUSCULAR; INTRAVENOUS; SUBCUTANEOUS at 10:27

## 2022-06-02 RX ADMIN — Medication 0.5 MILLIGRAM(S): at 10:26

## 2022-06-02 RX ADMIN — Medication 650 MILLIGRAM(S): at 11:31

## 2022-06-02 RX ADMIN — DORNASE ALFA 2.5 MILLIGRAM(S): 1 SOLUTION RESPIRATORY (INHALATION) at 15:44

## 2022-06-02 RX ADMIN — TRAMADOL HYDROCHLORIDE 50 MILLIGRAM(S): 50 TABLET ORAL at 23:00

## 2022-06-02 RX ADMIN — HEPARIN SODIUM 5000 UNIT(S): 5000 INJECTION INTRAVENOUS; SUBCUTANEOUS at 15:25

## 2022-06-02 RX ADMIN — Medication 500 MICROGRAM(S): at 10:27

## 2022-06-02 RX ADMIN — HEPARIN SODIUM 5000 UNIT(S): 5000 INJECTION INTRAVENOUS; SUBCUTANEOUS at 22:06

## 2022-06-02 RX ADMIN — Medication 500 MICROGRAM(S): at 03:38

## 2022-06-02 RX ADMIN — LEVALBUTEROL 0.63 MILLIGRAM(S): 1.25 SOLUTION, CONCENTRATE RESPIRATORY (INHALATION) at 21:26

## 2022-06-02 RX ADMIN — TRAMADOL HYDROCHLORIDE 50 MILLIGRAM(S): 50 TABLET ORAL at 16:40

## 2022-06-02 RX ADMIN — LEVALBUTEROL 0.63 MILLIGRAM(S): 1.25 SOLUTION, CONCENTRATE RESPIRATORY (INHALATION) at 03:41

## 2022-06-02 RX ADMIN — SODIUM CHLORIDE 4 MILLILITER(S): 9 INJECTION INTRAMUSCULAR; INTRAVENOUS; SUBCUTANEOUS at 15:38

## 2022-06-02 RX ADMIN — Medication 4 MILLIGRAM(S): at 22:06

## 2022-06-02 RX ADMIN — FAMOTIDINE 20 MILLIGRAM(S): 10 INJECTION INTRAVENOUS at 17:12

## 2022-06-02 RX ADMIN — FAMOTIDINE 20 MILLIGRAM(S): 10 INJECTION INTRAVENOUS at 05:53

## 2022-06-02 RX ADMIN — LEVALBUTEROL 0.63 MILLIGRAM(S): 1.25 SOLUTION, CONCENTRATE RESPIRATORY (INHALATION) at 15:38

## 2022-06-02 RX ADMIN — Medication 650 MILLIGRAM(S): at 20:15

## 2022-06-02 RX ADMIN — LIDOCAINE 1 PATCH: 4 CREAM TOPICAL at 11:31

## 2022-06-02 RX ADMIN — HEPARIN SODIUM 5000 UNIT(S): 5000 INJECTION INTRAVENOUS; SUBCUTANEOUS at 05:54

## 2022-06-02 RX ADMIN — Medication 500 MICROGRAM(S): at 21:26

## 2022-06-02 RX ADMIN — Medication 500 MICROGRAM(S): at 15:38

## 2022-06-02 RX ADMIN — TRAMADOL HYDROCHLORIDE 50 MILLIGRAM(S): 50 TABLET ORAL at 06:48

## 2022-06-02 RX ADMIN — Medication 0.5 MILLIGRAM(S): at 21:26

## 2022-06-02 RX ADMIN — Medication 650 MILLIGRAM(S): at 12:00

## 2022-06-02 RX ADMIN — SODIUM CHLORIDE 4 MILLILITER(S): 9 INJECTION INTRAMUSCULAR; INTRAVENOUS; SUBCUTANEOUS at 21:26

## 2022-06-02 RX ADMIN — Medication 650 MILLIGRAM(S): at 19:37

## 2022-06-02 RX ADMIN — LEVALBUTEROL 0.63 MILLIGRAM(S): 1.25 SOLUTION, CONCENTRATE RESPIRATORY (INHALATION) at 10:26

## 2022-06-02 RX ADMIN — TRAMADOL HYDROCHLORIDE 50 MILLIGRAM(S): 50 TABLET ORAL at 05:54

## 2022-06-02 NOTE — PROGRESS NOTE ADULT - SUBJECTIVE AND OBJECTIVE BOX
pt seen and examined w thoracic team on AM rounds  Subjective: no acute complaints  s/p blood patch 5/31  pt ambulatory     Vital Signs:  Vital Signs Last 24 Hrs  T(C): 36.6 (06-02-22 @ 08:00), Max: 37.1 (06-01-22 @ 21:01)  T(F): 97.9 (06-02-22 @ 08:00), Max: 98.7 (06-01-22 @ 21:01)  HR: 101 (06-02-22 @ 08:00) (60 - 103)  BP: 116/78 (06-02-22 @ 08:00) (98/77 - 120/62)  RR: 18 (06-02-22 @ 08:00) (17 - 18)  SpO2: 96% (06-02-22 @ 08:00) (95% - 100%) on (O2)    Telemetry/Alarms:sr  General: WN/WD NAD  Neurology: Awake, nonfocal, HUSAIN x 4  Eyes: Scleras clear, PERRLA/ EOMI, Gross vision intact  ENT:Gross hearing intact, grossly patent pharynx, no stridor  Neck: Neck supple, trachea midline, No JVD,   Respiratory: CTA B/L, No wheezing, rales, rhonchi  CV: RRR, S1S2, no murmurs, rubs or gallops  Abdominal: Soft, NT, ND +BS,   Extremities: No edema, + peripheral pulses  Skin: No Rashes, Hematoma, Ecchymosis  Lymphatic: No Neck, axilla, groin LAD  Psych: Oriented x 3, normal affect  Incisions: c,d,i  Tubes: L chest tube -10sxn + FEAL  Relevant labs, radiology and Medications reviewed            MEDICATIONS  (STANDING):  acetaminophen     Tablet .. 650 milliGRAM(s) Oral every 6 hours  acetaminophen   IVPB .. 900 milliGRAM(s) IV Intermittent once  bisacodyl 5 milliGRAM(s) Oral daily  buDESOnide    Inhalation Suspension 0.5 milliGRAM(s) Inhalation every 12 hours  dornase marco Solution 2.5 milliGRAM(s) Inhalation daily  famotidine    Tablet 20 milliGRAM(s) Oral two times a day  heparin   Injectable 5000 Unit(s) SubCutaneous every 8 hours  ipratropium    for Nebulization 500 MICROGram(s) Nebulizer every 6 hours  levalbuterol Inhalation 0.63 milliGRAM(s) Inhalation every 6 hours  lidocaine   4% Patch 1 Patch Transdermal daily  methylPREDNISolone   Oral   methylPREDNISolone 4 milliGRAM(s) Oral before breakfast  methylPREDNISolone 4 milliGRAM(s) Oral at bedtime  methylPREDNISolone 4 milliGRAM(s) Oral after lunch  polyethylene glycol 3350 17 Gram(s) Oral two times a day  senna 2 Tablet(s) Oral at bedtime  sodium chloride 3%  Inhalation 4 milliLiter(s) Inhalation every 6 hours    MEDICATIONS  (PRN):  HYDROmorphone  Injectable 0.25 milliGRAM(s) IV Push every 6 hours PRN Severe Pain (7 - 10)  naloxone Injectable 0.1 milliGRAM(s) IV Push every 3 minutes PRN For ANY of the following changes in patient status:  A. RR LESS THAN 10 breaths per minute, B. Oxygen saturation LESS THAN 90%, C. Sedation score of 6  ondansetron Injectable 4 milliGRAM(s) IV Push every 6 hours PRN Nausea  traMADol 25 milliGRAM(s) Oral every 4 hours PRN Moderate Pain (4 - 6)  traMADol 50 milliGRAM(s) Oral every 4 hours PRN Severe Pain (7 - 10)    Pertinent Physical Exam  I&O's Summary    01 Jun 2022 07:01  -  02 Jun 2022 07:00  --------------------------------------------------------  IN: 650 mL / OUT: 620 mL / NET: 30 mL                ASSESSMENT  Pt is a 67 yr old female scheduled for Flexible Bronchoscopy Left Video Assisted Thoracoscopy Left Upper Lobectomy Poss Lingular Sparing with Dr Villegas tentatively 5/25/22 - pt former smoker and had CT scan where pulmonary nodule was noted - pt denies pain or cough - some mild SOB with exertion   Patient instructed to contact surgeon's office concerning COVID test prior to surgery    (19 May 2022 10:00)     PROCEDURES:      Uniportal Left VATS TRISTAN Lobectomy 5/25/2022 Uniportal Left VATS TRISTAN Lobectomy 5/25/2022         ISSUES:                  Lung nodule              Postop pain              Chest tube in place  COPD, severe  LLL atelectasis  Hx of kidney stones s/p R ureter stent (5/2018)   Hx of shingles  Former smoker    Post op pt with significant hypoxemia. SOB. Also large air leak in Chest tube. 5/30 pt improving. Remaining on bronchodilators. Steriods changed to Medrol dose pack. 5/31-CT to waterseal last night, CT stable. Still has expir AL. O2 requirement down to 3LNC. Blood patch done. 6/1- PTX noted to be worse. CT back to -10cm suction.     PLAN  Neuro: Pain management  Pulm: Encourage coughing, deep breathing and use of incentive spirometry. Cont O2 Daily CXR.   Cardio: Monitor telemetry/alarms  GI: Tolerating diet. Continue stool softeners.  Renal: monitor urine output, supplement electrolytes as needed  Vasc: Heparin SC/SCDs for DVT prophylaxis  Heme: Stable H/H. .   ID: Off antibiotics. Stable.  Therapy: OOB/ambulate  Tubes: Monitor Chest tube output and air leak. Will place back to -10cm suction and rpt CXR later today  Disposition: Aim to D/C to home once can tolerate waterseal or CT removed. May need [facility]  Discussed with Cardiothoracic Team at AM rounds.

## 2022-06-03 LAB — SURGICAL PATHOLOGY STUDY: SIGNIFICANT CHANGE UP

## 2022-06-03 PROCEDURE — 71045 X-RAY EXAM CHEST 1 VIEW: CPT | Mod: 26

## 2022-06-03 RX ORDER — TRAMADOL HYDROCHLORIDE 50 MG/1
50 TABLET ORAL EVERY 4 HOURS
Refills: 0 | Status: DISCONTINUED | OUTPATIENT
Start: 2022-06-03 | End: 2022-06-05

## 2022-06-03 RX ORDER — TRAMADOL HYDROCHLORIDE 50 MG/1
25 TABLET ORAL EVERY 4 HOURS
Refills: 0 | Status: DISCONTINUED | OUTPATIENT
Start: 2022-06-03 | End: 2022-06-05

## 2022-06-03 RX ADMIN — Medication 650 MILLIGRAM(S): at 00:36

## 2022-06-03 RX ADMIN — LEVALBUTEROL 0.63 MILLIGRAM(S): 1.25 SOLUTION, CONCENTRATE RESPIRATORY (INHALATION) at 15:54

## 2022-06-03 RX ADMIN — Medication 650 MILLIGRAM(S): at 06:30

## 2022-06-03 RX ADMIN — SODIUM CHLORIDE 4 MILLILITER(S): 9 INJECTION INTRAMUSCULAR; INTRAVENOUS; SUBCUTANEOUS at 15:54

## 2022-06-03 RX ADMIN — Medication 650 MILLIGRAM(S): at 11:36

## 2022-06-03 RX ADMIN — TRAMADOL HYDROCHLORIDE 50 MILLIGRAM(S): 50 TABLET ORAL at 14:15

## 2022-06-03 RX ADMIN — HEPARIN SODIUM 5000 UNIT(S): 5000 INJECTION INTRAVENOUS; SUBCUTANEOUS at 06:05

## 2022-06-03 RX ADMIN — TRAMADOL HYDROCHLORIDE 50 MILLIGRAM(S): 50 TABLET ORAL at 09:54

## 2022-06-03 RX ADMIN — LIDOCAINE 1 PATCH: 4 CREAM TOPICAL at 23:14

## 2022-06-03 RX ADMIN — Medication 0.5 MILLIGRAM(S): at 21:26

## 2022-06-03 RX ADMIN — Medication 500 MICROGRAM(S): at 03:33

## 2022-06-03 RX ADMIN — LIDOCAINE 1 PATCH: 4 CREAM TOPICAL at 11:36

## 2022-06-03 RX ADMIN — LEVALBUTEROL 0.63 MILLIGRAM(S): 1.25 SOLUTION, CONCENTRATE RESPIRATORY (INHALATION) at 03:32

## 2022-06-03 RX ADMIN — SODIUM CHLORIDE 4 MILLILITER(S): 9 INJECTION INTRAMUSCULAR; INTRAVENOUS; SUBCUTANEOUS at 21:26

## 2022-06-03 RX ADMIN — Medication 650 MILLIGRAM(S): at 17:32

## 2022-06-03 RX ADMIN — Medication 650 MILLIGRAM(S): at 06:04

## 2022-06-03 RX ADMIN — SODIUM CHLORIDE 4 MILLILITER(S): 9 INJECTION INTRAMUSCULAR; INTRAVENOUS; SUBCUTANEOUS at 09:36

## 2022-06-03 RX ADMIN — Medication 4 MILLIGRAM(S): at 06:05

## 2022-06-03 RX ADMIN — LEVALBUTEROL 0.63 MILLIGRAM(S): 1.25 SOLUTION, CONCENTRATE RESPIRATORY (INHALATION) at 09:36

## 2022-06-03 RX ADMIN — TRAMADOL HYDROCHLORIDE 50 MILLIGRAM(S): 50 TABLET ORAL at 15:00

## 2022-06-03 RX ADMIN — Medication 500 MICROGRAM(S): at 09:36

## 2022-06-03 RX ADMIN — Medication 5 MILLIGRAM(S): at 11:35

## 2022-06-03 RX ADMIN — TRAMADOL HYDROCHLORIDE 50 MILLIGRAM(S): 50 TABLET ORAL at 22:28

## 2022-06-03 RX ADMIN — Medication 500 MICROGRAM(S): at 21:27

## 2022-06-03 RX ADMIN — LEVALBUTEROL 0.63 MILLIGRAM(S): 1.25 SOLUTION, CONCENTRATE RESPIRATORY (INHALATION) at 21:26

## 2022-06-03 RX ADMIN — Medication 0.5 MILLIGRAM(S): at 09:36

## 2022-06-03 RX ADMIN — TRAMADOL HYDROCHLORIDE 50 MILLIGRAM(S): 50 TABLET ORAL at 02:36

## 2022-06-03 RX ADMIN — DORNASE ALFA 2.5 MILLIGRAM(S): 1 SOLUTION RESPIRATORY (INHALATION) at 09:36

## 2022-06-03 RX ADMIN — SODIUM CHLORIDE 4 MILLILITER(S): 9 INJECTION INTRAMUSCULAR; INTRAVENOUS; SUBCUTANEOUS at 03:33

## 2022-06-03 RX ADMIN — Medication 500 MICROGRAM(S): at 15:54

## 2022-06-03 RX ADMIN — TRAMADOL HYDROCHLORIDE 50 MILLIGRAM(S): 50 TABLET ORAL at 20:18

## 2022-06-03 RX ADMIN — Medication 650 MILLIGRAM(S): at 01:26

## 2022-06-03 RX ADMIN — HEPARIN SODIUM 5000 UNIT(S): 5000 INJECTION INTRAVENOUS; SUBCUTANEOUS at 22:54

## 2022-06-03 RX ADMIN — Medication 650 MILLIGRAM(S): at 13:21

## 2022-06-03 RX ADMIN — TRAMADOL HYDROCHLORIDE 50 MILLIGRAM(S): 50 TABLET ORAL at 03:24

## 2022-06-03 RX ADMIN — FAMOTIDINE 20 MILLIGRAM(S): 10 INJECTION INTRAVENOUS at 06:05

## 2022-06-03 RX ADMIN — Medication 4 MILLIGRAM(S): at 22:54

## 2022-06-03 RX ADMIN — SENNA PLUS 2 TABLET(S): 8.6 TABLET ORAL at 22:55

## 2022-06-03 RX ADMIN — HEPARIN SODIUM 5000 UNIT(S): 5000 INJECTION INTRAVENOUS; SUBCUTANEOUS at 13:21

## 2022-06-03 RX ADMIN — LIDOCAINE 1 PATCH: 4 CREAM TOPICAL at 22:18

## 2022-06-03 RX ADMIN — FAMOTIDINE 20 MILLIGRAM(S): 10 INJECTION INTRAVENOUS at 17:33

## 2022-06-03 NOTE — PROGRESS NOTE ADULT - SUBJECTIVE AND OBJECTIVE BOX
Thoracic Surgery Progress Note    SUBJECTIVE: Patient seen and examined at bedside with surgical team, patient without complaints. Pt did not endorse any chest pain, SOB, fevers/chills, N/V. No AL appreciated this AM.    Vital Signs Last 24 Hrs  T(C): 36.4 (03 Jun 2022 09:00), Max: 37.2 (02 Jun 2022 17:57)  T(F): 97.6 (03 Jun 2022 09:00), Max: 98.9 (02 Jun 2022 17:57)  HR: 91 (03 Jun 2022 09:57) (91 - 114)  BP: 121/70 (03 Jun 2022 09:00) (105/65 - 121/70)  BP(mean): --  RR: 16 (03 Jun 2022 09:00) (16 - 19)  SpO2: 97% (03 Jun 2022 09:57) (95% - 99%)I&O's Detail    02 Jun 2022 07:01  -  03 Jun 2022 07:00  --------------------------------------------------------  IN:    Oral Fluid: 650 mL  Total IN: 650 mL    OUT:    Chest Tube (mL): 5 mL    Voided (mL): 1480 mL  Total OUT: 1485 mL    Total NET: -835 mL      03 Jun 2022 07:01  -  03 Jun 2022 11:15  --------------------------------------------------------  IN:  Total IN: 0 mL    OUT:    Chest Tube (mL): 20 mL  Total OUT: 20 mL    Total NET: -20 mL        Medications  MEDICATIONS  (STANDING):  acetaminophen     Tablet .. 650 milliGRAM(s) Oral every 6 hours  acetaminophen   IVPB .. 900 milliGRAM(s) IV Intermittent once  bisacodyl 5 milliGRAM(s) Oral daily  buDESOnide    Inhalation Suspension 0.5 milliGRAM(s) Inhalation every 12 hours  dornase marco Solution 2.5 milliGRAM(s) Inhalation daily  famotidine    Tablet 20 milliGRAM(s) Oral two times a day  heparin   Injectable 5000 Unit(s) SubCutaneous every 8 hours  ipratropium    for Nebulization 500 MICROGram(s) Nebulizer every 6 hours  levalbuterol Inhalation 0.63 milliGRAM(s) Inhalation every 6 hours  lidocaine   4% Patch 1 Patch Transdermal daily  methylPREDNISolone   Oral   methylPREDNISolone 4 milliGRAM(s) Oral before breakfast  methylPREDNISolone 4 milliGRAM(s) Oral at bedtime  polyethylene glycol 3350 17 Gram(s) Oral two times a day  senna 2 Tablet(s) Oral at bedtime  sodium chloride 3%  Inhalation 4 milliLiter(s) Inhalation every 6 hours    MEDICATIONS  (PRN):  HYDROmorphone  Injectable 0.25 milliGRAM(s) IV Push every 6 hours PRN Severe Pain (7 - 10)  naloxone Injectable 0.1 milliGRAM(s) IV Push every 3 minutes PRN For ANY of the following changes in patient status:  A. RR LESS THAN 10 breaths per minute, B. Oxygen saturation LESS THAN 90%, C. Sedation score of 6  ondansetron Injectable 4 milliGRAM(s) IV Push every 6 hours PRN Nausea  traMADol 25 milliGRAM(s) Oral every 4 hours PRN Moderate Pain (4 - 6)  traMADol 50 milliGRAM(s) Oral every 4 hours PRN Severe Pain (7 - 10)      Physical Exam  Constitutional: A&Ox3, NAD  Eyes: Scleras clear, PERRLA/ EOMI, Gross vision intact  Respiratory: Breathing comfortably on 3L NC      - L CT to sux  Gastrointestinal: Soft nontender, nondistended  Extremities: Moving all extremities, no edema  Skin: No Rashes, Hematoma, Ecchymosis    LABS:

## 2022-06-04 LAB
ANION GAP SERPL CALC-SCNC: 10 MMOL/L — SIGNIFICANT CHANGE UP (ref 7–14)
BUN SERPL-MCNC: 18 MG/DL — SIGNIFICANT CHANGE UP (ref 7–23)
CALCIUM SERPL-MCNC: 9.4 MG/DL — SIGNIFICANT CHANGE UP (ref 8.4–10.5)
CHLORIDE SERPL-SCNC: 98 MMOL/L — SIGNIFICANT CHANGE UP (ref 98–107)
CO2 SERPL-SCNC: 27 MMOL/L — SIGNIFICANT CHANGE UP (ref 22–31)
CREAT SERPL-MCNC: 0.52 MG/DL — SIGNIFICANT CHANGE UP (ref 0.5–1.3)
EGFR: 102 ML/MIN/1.73M2 — SIGNIFICANT CHANGE UP
GLUCOSE SERPL-MCNC: 119 MG/DL — HIGH (ref 70–99)
HCT VFR BLD CALC: 41.5 % — SIGNIFICANT CHANGE UP (ref 34.5–45)
HGB BLD-MCNC: 13.7 G/DL — SIGNIFICANT CHANGE UP (ref 11.5–15.5)
MAGNESIUM SERPL-MCNC: 2 MG/DL — SIGNIFICANT CHANGE UP (ref 1.6–2.6)
MCHC RBC-ENTMCNC: 30.7 PG — SIGNIFICANT CHANGE UP (ref 27–34)
MCHC RBC-ENTMCNC: 33 GM/DL — SIGNIFICANT CHANGE UP (ref 32–36)
MCV RBC AUTO: 93 FL — SIGNIFICANT CHANGE UP (ref 80–100)
NRBC # BLD: 0 /100 WBCS — SIGNIFICANT CHANGE UP
NRBC # FLD: 0 K/UL — SIGNIFICANT CHANGE UP
PHOSPHATE SERPL-MCNC: 3.2 MG/DL — SIGNIFICANT CHANGE UP (ref 2.5–4.5)
PLATELET # BLD AUTO: 247 K/UL — SIGNIFICANT CHANGE UP (ref 150–400)
POTASSIUM SERPL-MCNC: 4 MMOL/L — SIGNIFICANT CHANGE UP (ref 3.5–5.3)
POTASSIUM SERPL-SCNC: 4 MMOL/L — SIGNIFICANT CHANGE UP (ref 3.5–5.3)
RBC # BLD: 4.46 M/UL — SIGNIFICANT CHANGE UP (ref 3.8–5.2)
RBC # FLD: 12 % — SIGNIFICANT CHANGE UP (ref 10.3–14.5)
SODIUM SERPL-SCNC: 135 MMOL/L — SIGNIFICANT CHANGE UP (ref 135–145)
WBC # BLD: 10.41 K/UL — SIGNIFICANT CHANGE UP (ref 3.8–10.5)
WBC # FLD AUTO: 10.41 K/UL — SIGNIFICANT CHANGE UP (ref 3.8–10.5)

## 2022-06-04 PROCEDURE — 71045 X-RAY EXAM CHEST 1 VIEW: CPT | Mod: 26

## 2022-06-04 RX ORDER — ACETAMINOPHEN 500 MG
650 TABLET ORAL ONCE
Refills: 0 | Status: COMPLETED | OUTPATIENT
Start: 2022-06-04 | End: 2022-06-04

## 2022-06-04 RX ORDER — SODIUM CHLORIDE 9 MG/ML
500 INJECTION INTRAMUSCULAR; INTRAVENOUS; SUBCUTANEOUS ONCE
Refills: 0 | Status: COMPLETED | OUTPATIENT
Start: 2022-06-04 | End: 2022-06-04

## 2022-06-04 RX ORDER — HYDROMORPHONE HYDROCHLORIDE 2 MG/ML
0.25 INJECTION INTRAMUSCULAR; INTRAVENOUS; SUBCUTANEOUS ONCE
Refills: 0 | Status: DISCONTINUED | OUTPATIENT
Start: 2022-06-04 | End: 2022-06-04

## 2022-06-04 RX ORDER — HYDROMORPHONE HYDROCHLORIDE 2 MG/ML
0.5 INJECTION INTRAMUSCULAR; INTRAVENOUS; SUBCUTANEOUS ONCE
Refills: 0 | Status: DISCONTINUED | OUTPATIENT
Start: 2022-06-04 | End: 2022-06-04

## 2022-06-04 RX ORDER — BLEOMYCIN SULFATE 30 UNIT
60 VIAL (EA) INJECTION ONCE
Refills: 0 | Status: DISCONTINUED | OUTPATIENT
Start: 2022-06-04 | End: 2022-06-08

## 2022-06-04 RX ORDER — TRAMADOL HYDROCHLORIDE 50 MG/1
50 TABLET ORAL EVERY 4 HOURS
Refills: 0 | Status: DISCONTINUED | OUTPATIENT
Start: 2022-06-04 | End: 2022-06-08

## 2022-06-04 RX ORDER — ACETAMINOPHEN 500 MG
1000 TABLET ORAL ONCE
Refills: 0 | Status: DISCONTINUED | OUTPATIENT
Start: 2022-06-04 | End: 2022-06-04

## 2022-06-04 RX ORDER — TRAMADOL HYDROCHLORIDE 50 MG/1
50 TABLET ORAL ONCE
Refills: 0 | Status: DISCONTINUED | OUTPATIENT
Start: 2022-06-04 | End: 2022-06-04

## 2022-06-04 RX ORDER — ACETAMINOPHEN 500 MG
1000 TABLET ORAL ONCE
Refills: 0 | Status: COMPLETED | OUTPATIENT
Start: 2022-06-04 | End: 2022-06-04

## 2022-06-04 RX ORDER — TRAMADOL HYDROCHLORIDE 50 MG/1
25 TABLET ORAL EVERY 4 HOURS
Refills: 0 | Status: DISCONTINUED | OUTPATIENT
Start: 2022-06-04 | End: 2022-06-08

## 2022-06-04 RX ORDER — LIDOCAINE HYDROCHLORIDE AND EPINEPHRINE 10; 10 MG/ML; UG/ML
20 INJECTION, SOLUTION INFILTRATION; PERINEURAL ONCE
Refills: 0 | Status: DISCONTINUED | OUTPATIENT
Start: 2022-06-04 | End: 2022-06-08

## 2022-06-04 RX ADMIN — Medication 650 MILLIGRAM(S): at 05:16

## 2022-06-04 RX ADMIN — ONDANSETRON 4 MILLIGRAM(S): 8 TABLET, FILM COATED ORAL at 18:27

## 2022-06-04 RX ADMIN — HYDROMORPHONE HYDROCHLORIDE 0.25 MILLIGRAM(S): 2 INJECTION INTRAMUSCULAR; INTRAVENOUS; SUBCUTANEOUS at 20:00

## 2022-06-04 RX ADMIN — TRAMADOL HYDROCHLORIDE 50 MILLIGRAM(S): 50 TABLET ORAL at 22:26

## 2022-06-04 RX ADMIN — TRAMADOL HYDROCHLORIDE 50 MILLIGRAM(S): 50 TABLET ORAL at 16:04

## 2022-06-04 RX ADMIN — Medication 0.5 MILLIGRAM(S): at 21:35

## 2022-06-04 RX ADMIN — LEVALBUTEROL 0.63 MILLIGRAM(S): 1.25 SOLUTION, CONCENTRATE RESPIRATORY (INHALATION) at 21:36

## 2022-06-04 RX ADMIN — Medication 1000 MILLIGRAM(S): at 23:58

## 2022-06-04 RX ADMIN — Medication 650 MILLIGRAM(S): at 00:05

## 2022-06-04 RX ADMIN — SODIUM CHLORIDE 4 MILLILITER(S): 9 INJECTION INTRAMUSCULAR; INTRAVENOUS; SUBCUTANEOUS at 21:35

## 2022-06-04 RX ADMIN — LEVALBUTEROL 0.63 MILLIGRAM(S): 1.25 SOLUTION, CONCENTRATE RESPIRATORY (INHALATION) at 04:42

## 2022-06-04 RX ADMIN — Medication 400 MILLIGRAM(S): at 23:28

## 2022-06-04 RX ADMIN — Medication 4 MILLIGRAM(S): at 05:23

## 2022-06-04 RX ADMIN — HEPARIN SODIUM 5000 UNIT(S): 5000 INJECTION INTRAVENOUS; SUBCUTANEOUS at 05:17

## 2022-06-04 RX ADMIN — Medication 650 MILLIGRAM(S): at 01:14

## 2022-06-04 RX ADMIN — TRAMADOL HYDROCHLORIDE 50 MILLIGRAM(S): 50 TABLET ORAL at 22:58

## 2022-06-04 RX ADMIN — SODIUM CHLORIDE 4 MILLILITER(S): 9 INJECTION INTRAMUSCULAR; INTRAVENOUS; SUBCUTANEOUS at 15:12

## 2022-06-04 RX ADMIN — Medication 650 MILLIGRAM(S): at 13:00

## 2022-06-04 RX ADMIN — SENNA PLUS 2 TABLET(S): 8.6 TABLET ORAL at 21:55

## 2022-06-04 RX ADMIN — SODIUM CHLORIDE 250 MILLILITER(S): 9 INJECTION INTRAMUSCULAR; INTRAVENOUS; SUBCUTANEOUS at 10:05

## 2022-06-04 RX ADMIN — HEPARIN SODIUM 5000 UNIT(S): 5000 INJECTION INTRAVENOUS; SUBCUTANEOUS at 14:44

## 2022-06-04 RX ADMIN — Medication 650 MILLIGRAM(S): at 16:04

## 2022-06-04 RX ADMIN — Medication 500 MICROGRAM(S): at 09:16

## 2022-06-04 RX ADMIN — LEVALBUTEROL 0.63 MILLIGRAM(S): 1.25 SOLUTION, CONCENTRATE RESPIRATORY (INHALATION) at 15:12

## 2022-06-04 RX ADMIN — LIDOCAINE 1 PATCH: 4 CREAM TOPICAL at 23:50

## 2022-06-04 RX ADMIN — Medication 0.5 MILLIGRAM(S): at 09:16

## 2022-06-04 RX ADMIN — LEVALBUTEROL 0.63 MILLIGRAM(S): 1.25 SOLUTION, CONCENTRATE RESPIRATORY (INHALATION) at 09:16

## 2022-06-04 RX ADMIN — SODIUM CHLORIDE 4 MILLILITER(S): 9 INJECTION INTRAMUSCULAR; INTRAVENOUS; SUBCUTANEOUS at 09:16

## 2022-06-04 RX ADMIN — HEPARIN SODIUM 5000 UNIT(S): 5000 INJECTION INTRAVENOUS; SUBCUTANEOUS at 21:55

## 2022-06-04 RX ADMIN — HYDROMORPHONE HYDROCHLORIDE 0.25 MILLIGRAM(S): 2 INJECTION INTRAMUSCULAR; INTRAVENOUS; SUBCUTANEOUS at 18:24

## 2022-06-04 RX ADMIN — Medication 650 MILLIGRAM(S): at 11:57

## 2022-06-04 RX ADMIN — Medication 500 MICROGRAM(S): at 15:12

## 2022-06-04 RX ADMIN — LIDOCAINE 1 PATCH: 4 CREAM TOPICAL at 11:57

## 2022-06-04 RX ADMIN — SODIUM CHLORIDE 4 MILLILITER(S): 9 INJECTION INTRAMUSCULAR; INTRAVENOUS; SUBCUTANEOUS at 04:42

## 2022-06-04 RX ADMIN — LIDOCAINE 1 PATCH: 4 CREAM TOPICAL at 19:59

## 2022-06-04 RX ADMIN — FAMOTIDINE 20 MILLIGRAM(S): 10 INJECTION INTRAVENOUS at 05:17

## 2022-06-04 RX ADMIN — Medication 5 MILLIGRAM(S): at 11:57

## 2022-06-04 RX ADMIN — Medication 500 MICROGRAM(S): at 21:36

## 2022-06-04 RX ADMIN — DORNASE ALFA 2.5 MILLIGRAM(S): 1 SOLUTION RESPIRATORY (INHALATION) at 09:16

## 2022-06-04 RX ADMIN — HYDROMORPHONE HYDROCHLORIDE 0.25 MILLIGRAM(S): 2 INJECTION INTRAMUSCULAR; INTRAVENOUS; SUBCUTANEOUS at 19:44

## 2022-06-04 RX ADMIN — Medication 500 MICROGRAM(S): at 04:42

## 2022-06-04 NOTE — PROCEDURE NOTE - NSTOLERANCE_GEN_A_CORE
Christine Jernigan is a 26 year old female presenting with ear and throat pain.  Onset of issues started over a year ago.  No complaint of loss of hearing or drainage from the ears.  Complaint of pain and pressure from the ears No complaint of hoarseness or trouble swallowing.      Denies Known Latex allergy.  Medications verified.  Tobacco Hx verified and updated, if changes.  Allergies verified.    Patient medical and surgical Hx updated, if changes.  Denies Hx of bleeding under anesthesia.       ROS:    Constitutional: denies fevers, chills, night sweats, or weight changes, weight loss, decreased appetite     Eyes: denies vision loss, double vision, itching eyes, eye pain    Heme/Lymph: denies anemia, swollen lymph nodes    Allergy/Immune: denies seasonal allergies, eczema, or asthma    Cardiovascular: denies chest pain, palpitations, extremity swelling, or extremity pain    Respiratory: denies cough, pain with breathing, bronchitis, or emphysema    Musuloskeletal: denies severe or persistent muscle pain, unexplained weakness, cramping, or joint pain    Skin: denies rash, itching, bleeding, changes in pigmented lesions, or non-healing sores    GI: diarrhea, IBS    Neuro: migraine headaches     Endocrine: appetite is decreased    Psychiatric:anxiety, social             Patient tolerated procedure well.

## 2022-06-04 NOTE — PROGRESS NOTE ADULT - SUBJECTIVE AND OBJECTIVE BOX
pt w/o complaints. Still w/ passive airleak. POD#10 S/P TRISTAN lobectomy and blood patch 5/31.     PAST MEDICAL & SURGICAL HISTORY:  Emphysema lung  Shingles  Renal calculi  Pulmonary nodule  Former smoker  History of laparoscopic cholecystectomy  1995  S/P cystoscopy  with right renal stent insertion 5/8/18  Elective surgery  hernia repair 2014 - with mesh    Vital Signs Last 24 Hrs  T(C): 37.1 (04 Jun 2022 09:12), Max: 37.1 (04 Jun 2022 09:12)  T(F): 98.8 (04 Jun 2022 09:12), Max: 98.8 (04 Jun 2022 09:12)  HR: 113 (04 Jun 2022 09:42) (87 - 115)  BP: 103/71 (04 Jun 2022 09:12) (103/71 - 127/64)  BP(mean): --  RR: 18 (04 Jun 2022 09:12) (16 - 18)  SpO2: 98% (04 Jun 2022 09:42) (92% - 100%)    MEDICATIONS  (STANDING):  acetaminophen     Tablet .. 650 milliGRAM(s) Oral every 6 hours  acetaminophen   IVPB .. 900 milliGRAM(s) IV Intermittent once  bisacodyl 5 milliGRAM(s) Oral daily  buDESOnide    Inhalation Suspension 0.5 milliGRAM(s) Inhalation every 12 hours  dornase marco Solution 2.5 milliGRAM(s) Inhalation daily  famotidine    Tablet 20 milliGRAM(s) Oral two times a day  heparin   Injectable 5000 Unit(s) SubCutaneous every 8 hours  ipratropium    for Nebulization 500 MICROGram(s) Nebulizer every 6 hours  levalbuterol Inhalation 0.63 milliGRAM(s) Inhalation every 6 hours  lidocaine   4% Patch 1 Patch Transdermal daily  polyethylene glycol 3350 17 Gram(s) Oral two times a day  senna 2 Tablet(s) Oral at bedtime  sodium chloride 3%  Inhalation 4 milliLiter(s) Inhalation every 6 hours    MEDICATIONS  (PRN):  HYDROmorphone  Injectable 0.25 milliGRAM(s) IV Push every 6 hours PRN Severe Pain (7 - 10)  naloxone Injectable 0.1 milliGRAM(s) IV Push every 3 minutes PRN For ANY of the following changes in patient status:  A. RR LESS THAN 10 breaths per minute, B. Oxygen saturation LESS THAN 90%, C. Sedation score of 6  ondansetron Injectable 4 milliGRAM(s) IV Push every 6 hours PRN Nausea  traMADol 25 milliGRAM(s) Oral every 4 hours PRN Moderate Pain (4 - 6)  traMADol 50 milliGRAM(s) Oral every 4 hours PRN Severe Pain (7 - 10)      I&O's Detail    03 Jun 2022 07:01  -  04 Jun 2022 07:00  --------------------------------------------------------  IN:    Oral Fluid: 450 mL  Total IN: 450 mL    OUT:    Chest Tube (mL): 20 mL    Voided (mL): 750 mL  Total OUT: 770 mL    Total NET: -320 mL      Pt is 7.8 liters negative for length of stay                          13.7   10.41 )-----------( 247      ( 04 Jun 2022 07:15 )             41.5   06-04    135  |  98  |  18  ----------------------------<  119<H>  4.0   |  27  |  0.52    Ca    9.4      04 Jun 2022 07:15  Phos  3.2     06-04  Mg     2.00     06-04    cxr lungs clear, chest tube in good position, no obvious ptx or effusion    General: WN/WD NAD  Neurology: A&Ox3, nonfocal, HUSAIN x 4  Eyes: PERRLA/ EOMI, Gross vision intact  ENT/Neck: Neck supple, trachea midline, No JVD, Gross hearing intact  Respiratory: CTA B/L, No wheezing, rales, rhonchi  CV: RRR, S1S2, no murmurs, rubs or gallops  Abdominal: Soft, NT, ND +BS,   Extremities: No edema, + peripheral pulses  Skin: No Rashes, Hematoma, Ecchymosis  Incisions: c/d/i  Tubes: +AL on -10cm sx, drained 10cc

## 2022-06-04 NOTE — PROVIDER CONTACT NOTE (OTHER) - ACTION/TREATMENT ORDERED:
Oxygen increased to 4L, IV tylenol given for pain and CXR orderd. Patient O2 saturation increased to 93% on 4L NC. Will continue to manage pain with analgesics.

## 2022-06-04 NOTE — PROGRESS NOTE ADULT - ASSESSMENT
pt w/o complaints. Still w/ passive airleak. POD#10 S/P TRISTAN lobectomy and blood patch 5/31.     Will discuss possible bleomycin pleurodesis with attending on am rounds    Neuro: Pain management  Pulm: Encourage coughing, deep breathing and use of incentive spirometry. Wean off supplemental oxygen as able. Daily CXR.   Cardio: Monitor telemetry/alarms  GI: Tolerating diet. Continue stool softeners.  Renal: monitor urine output, supplement electrolytes as needed  Vasc: Heparin SC/SCDs for DVT prophylaxis  Heme: Stable H/H. .   ID: Off antibiotics. Stable.  Therapy: OOB/ambulate  Tubes: Monitor Chest tube output  Disposition: to discuss on am rounds, if bleomycin pleurodesis performed will be here at least another 48 hours  Discussed with Cardiothoracic Team at AM rounds.    
Pt is a 67 yr old female scheduled for Flexible Bronchoscopy Left Video Assisted Thoracoscopy Left Upper Lobectomy Poss Lingular Sparing with Dr Villegas tentatively 5/25/22 - pt former smoker and had CT scan where pulmonary nodule was noted - pt denies pain or cough - some mild SOB with exertion      PROCEDURES:    Uniportal Left VATS TRISTAN Lobectomy 5/25/2022 Uniportal Left VATS TRISTAN Lobectomy 5/25/2022     ISSUES:                Lung nodule              Postop pain              Chest tube in place  COPD, severe  LLL atelectasis  Hx of kidney stones s/p R ureter stent (5/2018)   Hx of shingles  Former smoker    Post op pt with significant hypoxemia. SOB. Also large air leak in Chest tube. 5/30 pt improving. Remaining on bronchodilators. Steriods changed to Medrol dose pack. 5/31-CT to waterseal last night, CT stable. Still has expir AL. O2 requirement down to 3LNC. Blood patch done. 6/1- PTX noted to be worse. CT back to -10cm suction. No AL appreciated morning of 6/3    PLAN  Neuro: Pain management  Pulm: Encourage coughing, deep breathing and use of incentive spirometry. Cont O2. Daily CXR.   Cardio: Monitor telemetry/alarms  GI: Tolerating diet. Continue stool softeners.  Renal: monitor urine output, supplement electrolytes as needed  Vasc: Heparin SC/SCDs for DVT prophylaxis  Heme: Stable H/H. .   ID: Off antibiotics. Stable.  Therapy: OOB/ambulate  Tubes: Cont CT to -10 sux  Disposition: Aim to D/C to home once can tolerate waterseal or CT removed. May need [facility]  Discussed with Cardiothoracic Team at AM rounds.

## 2022-06-04 NOTE — PROCEDURE NOTE - GENERAL PROCEDURE DETAILS
after prepping pleurevac tubing with betadine, 20cc of 1%lidocaine was infused for anesthesia. 20 minutes later, 60units of bleomycin was instilled into left pleural space, chest tube elevated as patient as airleak

## 2022-06-04 NOTE — PROVIDER CONTACT NOTE (OTHER) - ASSESSMENT
Patient desat down to 79% while on 3L NC. Patient has been complaining of pain, denies shortness of breath/difficulty breathing. Patient states it "hurts too much to take a deep breath"

## 2022-06-04 NOTE — PROVIDER CONTACT NOTE (OTHER) - DATE AND TIME:
· Refill requested by: Pharmacy Fax  · Last office visit for diabetes: 1/27/22  · Next office visit: 2/25/22  · Medication(s) Requested: Lantus  · Last refill: 6/28/21  · Dosage: 30 units  · Sig:  Daily  · Quantity requested: 15 ml  Last HA1c:    Hemoglobin A1C (%)   Date Value   01/07/2022 9.7 (H)     If last HA1C>8 needs to be seen within the last 3 months  If HA1C<8 needs to be seen within the last 6 months  Filled per protocol     04-Jun-2022 23:15

## 2022-06-05 LAB
ANION GAP SERPL CALC-SCNC: 10 MMOL/L — SIGNIFICANT CHANGE UP (ref 7–14)
BUN SERPL-MCNC: 22 MG/DL — SIGNIFICANT CHANGE UP (ref 7–23)
CALCIUM SERPL-MCNC: 9.5 MG/DL — SIGNIFICANT CHANGE UP (ref 8.4–10.5)
CHLORIDE SERPL-SCNC: 99 MMOL/L — SIGNIFICANT CHANGE UP (ref 98–107)
CO2 SERPL-SCNC: 30 MMOL/L — SIGNIFICANT CHANGE UP (ref 22–31)
CREAT SERPL-MCNC: 0.46 MG/DL — LOW (ref 0.5–1.3)
EGFR: 105 ML/MIN/1.73M2 — SIGNIFICANT CHANGE UP
GLUCOSE SERPL-MCNC: 135 MG/DL — HIGH (ref 70–99)
HCT VFR BLD CALC: 43.7 % — SIGNIFICANT CHANGE UP (ref 34.5–45)
HGB BLD-MCNC: 14.2 G/DL — SIGNIFICANT CHANGE UP (ref 11.5–15.5)
MCHC RBC-ENTMCNC: 30.5 PG — SIGNIFICANT CHANGE UP (ref 27–34)
MCHC RBC-ENTMCNC: 32.5 GM/DL — SIGNIFICANT CHANGE UP (ref 32–36)
MCV RBC AUTO: 94 FL — SIGNIFICANT CHANGE UP (ref 80–100)
NRBC # BLD: 0 /100 WBCS — SIGNIFICANT CHANGE UP
NRBC # FLD: 0 K/UL — SIGNIFICANT CHANGE UP
PLATELET # BLD AUTO: 231 K/UL — SIGNIFICANT CHANGE UP (ref 150–400)
POTASSIUM SERPL-MCNC: 4.4 MMOL/L — SIGNIFICANT CHANGE UP (ref 3.5–5.3)
POTASSIUM SERPL-SCNC: 4.4 MMOL/L — SIGNIFICANT CHANGE UP (ref 3.5–5.3)
RBC # BLD: 4.65 M/UL — SIGNIFICANT CHANGE UP (ref 3.8–5.2)
RBC # FLD: 12.1 % — SIGNIFICANT CHANGE UP (ref 10.3–14.5)
SODIUM SERPL-SCNC: 139 MMOL/L — SIGNIFICANT CHANGE UP (ref 135–145)
WBC # BLD: 16.08 K/UL — HIGH (ref 3.8–10.5)
WBC # FLD AUTO: 16.08 K/UL — HIGH (ref 3.8–10.5)

## 2022-06-05 PROCEDURE — 71045 X-RAY EXAM CHEST 1 VIEW: CPT | Mod: 26

## 2022-06-05 RX ORDER — HYDROMORPHONE HYDROCHLORIDE 2 MG/ML
0.25 INJECTION INTRAMUSCULAR; INTRAVENOUS; SUBCUTANEOUS ONCE
Refills: 0 | Status: DISCONTINUED | OUTPATIENT
Start: 2022-06-05 | End: 2022-06-08

## 2022-06-05 RX ORDER — HYDROMORPHONE HYDROCHLORIDE 2 MG/ML
0.25 INJECTION INTRAMUSCULAR; INTRAVENOUS; SUBCUTANEOUS ONCE
Refills: 0 | Status: DISCONTINUED | OUTPATIENT
Start: 2022-06-05 | End: 2022-06-05

## 2022-06-05 RX ADMIN — HYDROMORPHONE HYDROCHLORIDE 0.25 MILLIGRAM(S): 2 INJECTION INTRAMUSCULAR; INTRAVENOUS; SUBCUTANEOUS at 04:00

## 2022-06-05 RX ADMIN — Medication 650 MILLIGRAM(S): at 11:46

## 2022-06-05 RX ADMIN — TRAMADOL HYDROCHLORIDE 50 MILLIGRAM(S): 50 TABLET ORAL at 03:20

## 2022-06-05 RX ADMIN — Medication 0.5 MILLIGRAM(S): at 23:19

## 2022-06-05 RX ADMIN — LEVALBUTEROL 0.63 MILLIGRAM(S): 1.25 SOLUTION, CONCENTRATE RESPIRATORY (INHALATION) at 23:19

## 2022-06-05 RX ADMIN — ONDANSETRON 4 MILLIGRAM(S): 8 TABLET, FILM COATED ORAL at 19:49

## 2022-06-05 RX ADMIN — SENNA PLUS 2 TABLET(S): 8.6 TABLET ORAL at 23:03

## 2022-06-05 RX ADMIN — DORNASE ALFA 2.5 MILLIGRAM(S): 1 SOLUTION RESPIRATORY (INHALATION) at 09:03

## 2022-06-05 RX ADMIN — SODIUM CHLORIDE 4 MILLILITER(S): 9 INJECTION INTRAMUSCULAR; INTRAVENOUS; SUBCUTANEOUS at 09:03

## 2022-06-05 RX ADMIN — POLYETHYLENE GLYCOL 3350 17 GRAM(S): 17 POWDER, FOR SOLUTION ORAL at 05:51

## 2022-06-05 RX ADMIN — Medication 650 MILLIGRAM(S): at 11:16

## 2022-06-05 RX ADMIN — LIDOCAINE 1 PATCH: 4 CREAM TOPICAL at 11:15

## 2022-06-05 RX ADMIN — LEVALBUTEROL 0.63 MILLIGRAM(S): 1.25 SOLUTION, CONCENTRATE RESPIRATORY (INHALATION) at 15:04

## 2022-06-05 RX ADMIN — Medication 500 MICROGRAM(S): at 23:18

## 2022-06-05 RX ADMIN — HEPARIN SODIUM 5000 UNIT(S): 5000 INJECTION INTRAVENOUS; SUBCUTANEOUS at 05:51

## 2022-06-05 RX ADMIN — Medication 5 MILLIGRAM(S): at 11:18

## 2022-06-05 RX ADMIN — SODIUM CHLORIDE 4 MILLILITER(S): 9 INJECTION INTRAMUSCULAR; INTRAVENOUS; SUBCUTANEOUS at 15:04

## 2022-06-05 RX ADMIN — Medication 650 MILLIGRAM(S): at 17:47

## 2022-06-05 RX ADMIN — LEVALBUTEROL 0.63 MILLIGRAM(S): 1.25 SOLUTION, CONCENTRATE RESPIRATORY (INHALATION) at 09:03

## 2022-06-05 RX ADMIN — TRAMADOL HYDROCHLORIDE 50 MILLIGRAM(S): 50 TABLET ORAL at 23:06

## 2022-06-05 RX ADMIN — TRAMADOL HYDROCHLORIDE 50 MILLIGRAM(S): 50 TABLET ORAL at 13:28

## 2022-06-05 RX ADMIN — TRAMADOL HYDROCHLORIDE 50 MILLIGRAM(S): 50 TABLET ORAL at 13:58

## 2022-06-05 RX ADMIN — TRAMADOL HYDROCHLORIDE 50 MILLIGRAM(S): 50 TABLET ORAL at 17:47

## 2022-06-05 RX ADMIN — HEPARIN SODIUM 5000 UNIT(S): 5000 INJECTION INTRAVENOUS; SUBCUTANEOUS at 13:28

## 2022-06-05 RX ADMIN — LEVALBUTEROL 0.63 MILLIGRAM(S): 1.25 SOLUTION, CONCENTRATE RESPIRATORY (INHALATION) at 04:48

## 2022-06-05 RX ADMIN — FAMOTIDINE 20 MILLIGRAM(S): 10 INJECTION INTRAVENOUS at 05:51

## 2022-06-05 RX ADMIN — HEPARIN SODIUM 5000 UNIT(S): 5000 INJECTION INTRAVENOUS; SUBCUTANEOUS at 23:01

## 2022-06-05 RX ADMIN — TRAMADOL HYDROCHLORIDE 50 MILLIGRAM(S): 50 TABLET ORAL at 08:37

## 2022-06-05 RX ADMIN — Medication 650 MILLIGRAM(S): at 06:23

## 2022-06-05 RX ADMIN — Medication 500 MICROGRAM(S): at 15:04

## 2022-06-05 RX ADMIN — TRAMADOL HYDROCHLORIDE 50 MILLIGRAM(S): 50 TABLET ORAL at 09:07

## 2022-06-05 RX ADMIN — HYDROMORPHONE HYDROCHLORIDE 0.25 MILLIGRAM(S): 2 INJECTION INTRAMUSCULAR; INTRAVENOUS; SUBCUTANEOUS at 03:40

## 2022-06-05 RX ADMIN — Medication 650 MILLIGRAM(S): at 05:52

## 2022-06-05 RX ADMIN — Medication 500 MICROGRAM(S): at 04:49

## 2022-06-05 RX ADMIN — SODIUM CHLORIDE 4 MILLILITER(S): 9 INJECTION INTRAMUSCULAR; INTRAVENOUS; SUBCUTANEOUS at 23:18

## 2022-06-05 RX ADMIN — TRAMADOL HYDROCHLORIDE 50 MILLIGRAM(S): 50 TABLET ORAL at 03:55

## 2022-06-05 RX ADMIN — SODIUM CHLORIDE 4 MILLILITER(S): 9 INJECTION INTRAMUSCULAR; INTRAVENOUS; SUBCUTANEOUS at 04:49

## 2022-06-05 RX ADMIN — Medication 0.5 MILLIGRAM(S): at 09:03

## 2022-06-05 RX ADMIN — FAMOTIDINE 20 MILLIGRAM(S): 10 INJECTION INTRAVENOUS at 17:48

## 2022-06-05 RX ADMIN — Medication 500 MICROGRAM(S): at 09:03

## 2022-06-05 RX ADMIN — LIDOCAINE 1 PATCH: 4 CREAM TOPICAL at 19:00

## 2022-06-05 NOTE — PROGRESS NOTE ADULT - SUBJECTIVE AND OBJECTIVE BOX
Subjective: "Last night was horrible. I was in so much pain" Pt states significant pain post Bleo pleurodesis yesterday. Slowly improving. Given pain meds w relief. USing IS. Encouraged to get OOB, ambulate. No SOb. Using 3LNC    Vital Signs:  Vital Signs Last 24 Hrs  T(C): 36.8 (06-05-22 @ 11:10), Max: 36.8 (06-05-22 @ 11:10)  T(F): 98.3 (06-05-22 @ 11:10), Max: 98.3 (06-05-22 @ 11:10)  HR: 78 (06-05-22 @ 11:10) (9 - 108)  BP: 99/63 (06-05-22 @ 11:10) (99/63 - 118/74)  RR: 18 (06-05-22 @ 11:10) (18 - 19)  SpO2: 93% (06-05-22 @ 11:10) (91% - 99%) on (O2)    Telemetry/Alarms:  General: WN/WD NAD  Neurology: Awake, nonfocal, HUSAIN x 4  Eyes: Scleras clear, PERRLA/ EOMI, Gross vision intact  ENT:Gross hearing intact, grossly patent pharynx, no stridor  Neck: Neck supple, trachea midline, No JVD,   Respiratory: Dec'd BS bilat bases  CV: RRR, S1S2, no murmurs, rubs or gallops  Abdominal: Soft, NT, ND +BS, no BM x 2 days. +Flatus  Extremities: No edema, + peripheral pulses  Skin: No Rashes, Hematoma, Ecchymosis  Lymphatic: No Neck, axilla, groin LAD  Psych: Oriented x 3, normal affect  Incisions: left VATS c/d/i.   Tubes: Left CT- 20cc/24hrs on -10cm sxn, no obvious AL, only tidaling but poor cough effort.   Relevant labs, radiology and Medications reviewed           CXR no obvious ptx.              14.2   16.08 )-----------( 231      ( 05 Jun 2022 06:00 )             43.7     06-05    139  |  99  |  22  ----------------------------<  135<H>  4.4   |  30  |  0.46<L>    Ca    9.5      05 Jun 2022 06:00  Phos  3.2     06-04  Mg     2.00     06-04        MEDICATIONS  (STANDING):  acetaminophen     Tablet .. 650 milliGRAM(s) Oral every 6 hours  acetaminophen   IVPB .. 900 milliGRAM(s) IV Intermittent once  bisacodyl 5 milliGRAM(s) Oral daily  bleomycin PF IntraPleural (Non - oncologic) 60 Unit(s) IntraPleural. once  buDESOnide    Inhalation Suspension 0.5 milliGRAM(s) Inhalation every 12 hours  dornase marco Solution 2.5 milliGRAM(s) Inhalation daily  famotidine    Tablet 20 milliGRAM(s) Oral two times a day  heparin   Injectable 5000 Unit(s) SubCutaneous every 8 hours  ipratropium    for Nebulization 500 MICROGram(s) Nebulizer every 6 hours  levalbuterol Inhalation 0.63 milliGRAM(s) Inhalation every 6 hours  lidocaine   4% Patch 1 Patch Transdermal daily  lidocaine 1%/epinephrine 1:200,000 Inj 20 milliLiter(s) Local Injection once  polyethylene glycol 3350 17 Gram(s) Oral two times a day  senna 2 Tablet(s) Oral at bedtime  sodium chloride 3%  Inhalation 4 milliLiter(s) Inhalation every 6 hours    MEDICATIONS  (PRN):  naloxone Injectable 0.1 milliGRAM(s) IV Push every 3 minutes PRN For ANY of the following changes in patient status:  A. RR LESS THAN 10 breaths per minute, B. Oxygen saturation LESS THAN 90%, C. Sedation score of 6  ondansetron Injectable 4 milliGRAM(s) IV Push every 6 hours PRN Nausea  traMADol 25 milliGRAM(s) Oral every 4 hours PRN Moderate Pain (4 - 6)  traMADol 50 milliGRAM(s) Oral every 4 hours PRN Severe Pain (7 - 10)    Pertinent Physical Exam  I&O's Summary    04 Jun 2022 07:01  -  05 Jun 2022 07:00  --------------------------------------------------------  IN: 0 mL / OUT: 0 mL / NET: 0 mL    05 Jun 2022 07:01  -  05 Jun 2022 12:46  --------------------------------------------------------  IN: 0 mL / OUT: 60 mL / NET: -60 mL  e     Assessment  67y Female  w/ PAST MEDICAL & SURGICAL HISTORY:  Emphysema lung      Shingles      Renal calculi      Pulmonary nodule      Former smoker      History of laparoscopic cholecystectomy  1995      S/P cystoscopy  with right renal stent insertion 5/8/18      Elective surgery  hernia repair 2014 - with mesh      admitted with complaints of Patient is a 67y old  Female who presents with a chief complaint of Lung surgery (01 Jun 2022 11:47)  Pt is a 67 yr old female scheduled for Flexible Bronchoscopy Left Video Assisted Thoracoscopy Left Upper Lobectomy Poss Lingular Sparing with Dr Villegas tentatively 5/25/22 - pt former smoker and had CT scan where pulmonary nodule was noted - pt denies pain or cough - some mild SOB with exertion   Patient instructed to contact surgeon's office concerning COVID test prior to surgery    (19 May 2022 10:00)     PROCEDURES:       Uniportal Left VATS TRISTAN Lobectomy 5/25/2022 Uniportal Left VATS TRISTAN Lobectomy 5/25/2022         ISSUES:                  Lung nodule                  Postop pain                  Chest tube in place  COPD, severe  LLL atelectasis  Hx of kidney stones s/p R ureter stent (5/2018)   Hx of shingles              Former smoker  Post op pt with significant hypoxemia. SOB. Also large air leak in Chest tube. 5/30 pt improving. Remaining on bronchodilators. Steriods changed to Medrol dose pack. 5/31-CT to waterseal last night, CT stable. Still has expir AL. O2 requirement down to 3LNC. Blood patch done. 6/1- PTX noted to be worse. CT back to -10cm suction. 6/4-Still AL, still on -10cm sxn. Bleo pleurodesis done at bedside.       PLAN  Neuro: Pain management  Pulm: Encourage coughing, deep breathing and use of incentive spirometry.  Daily CXR.   Cardio: Monitor telemetry/alarms  GI: Tolerating diet. Continue stool softeners.  Renal: monitor urine output, supplement electrolytes as needed  Vasc: Heparin SC/SCDs for DVT prophylaxis  Heme: Stable H/H. .   ID: Off antibiotics. Stable.  Therapy: OOB/ambulate  Tubes: Monitor Chest tube output and air leak. Keep to suction today.   Disposition: Aim to D/C to home once CT removed.   Discussed with Cardiothoracic Team at AM rounds.      Subjective: "Last night was horrible. I was in so much pain" Pt states significant pain post Bleo pleurodesis yesterday. Slowly improving. Given pain meds w relief. USing IS. Encouraged to get OOB, ambulate. No SOb. Using 3LNC    Vital Signs:  Vital Signs Last 24 Hrs  T(C): 36.8 (06-05-22 @ 11:10), Max: 36.8 (06-05-22 @ 11:10)  T(F): 98.3 (06-05-22 @ 11:10), Max: 98.3 (06-05-22 @ 11:10)  HR: 78 (06-05-22 @ 11:10) (9 - 108)  BP: 99/63 (06-05-22 @ 11:10) (99/63 - 118/74)  RR: 18 (06-05-22 @ 11:10) (18 - 19)  SpO2: 93% (06-05-22 @ 11:10) (91% - 99%) on (O2)    Telemetry/Alarms:  General: WN/WD NAD  Neurology: Awake, nonfocal, HUSAIN x 4  Eyes: Scleras clear, PERRLA/ EOMI, Gross vision intact  ENT:Gross hearing intact, grossly patent pharynx, no stridor  Neck: Neck supple, trachea midline, No JVD,   Respiratory: Dec'd BS bilat bases  CV: RRR, S1S2, no murmurs, rubs or gallops  Abdominal: Soft, NT, ND +BS, no BM x 2 days. +Flatus  Extremities: No edema, + peripheral pulses  Skin: No Rashes, Hematoma, Ecchymosis  Lymphatic: No Neck, axilla, groin LAD  Psych: Oriented x 3, normal affect  Incisions: left VATS c/d/i.   Tubes: Left CT- 20cc/24hrs on -10cm sxn, no obvious AL, only tidaling but poor cough effort.   Relevant labs, radiology and Medications reviewed           CXR no obvious ptx.              14.2   16.08 )-----------( 231      ( 05 Jun 2022 06:00 )             43.7     06-05    139  |  99  |  22  ----------------------------<  135<H>  4.4   |  30  |  0.46<L>    Ca    9.5      05 Jun 2022 06:00  Phos  3.2     06-04  Mg     2.00     06-04        MEDICATIONS  (STANDING):  acetaminophen     Tablet .. 650 milliGRAM(s) Oral every 6 hours  acetaminophen   IVPB .. 900 milliGRAM(s) IV Intermittent once  bisacodyl 5 milliGRAM(s) Oral daily  bleomycin PF IntraPleural (Non - oncologic) 60 Unit(s) IntraPleural. once  buDESOnide    Inhalation Suspension 0.5 milliGRAM(s) Inhalation every 12 hours  dornase marco Solution 2.5 milliGRAM(s) Inhalation daily  famotidine    Tablet 20 milliGRAM(s) Oral two times a day  heparin   Injectable 5000 Unit(s) SubCutaneous every 8 hours  ipratropium    for Nebulization 500 MICROGram(s) Nebulizer every 6 hours  levalbuterol Inhalation 0.63 milliGRAM(s) Inhalation every 6 hours  lidocaine   4% Patch 1 Patch Transdermal daily  lidocaine 1%/epinephrine 1:200,000 Inj 20 milliLiter(s) Local Injection once  polyethylene glycol 3350 17 Gram(s) Oral two times a day  senna 2 Tablet(s) Oral at bedtime  sodium chloride 3%  Inhalation 4 milliLiter(s) Inhalation every 6 hours    MEDICATIONS  (PRN):  naloxone Injectable 0.1 milliGRAM(s) IV Push every 3 minutes PRN For ANY of the following changes in patient status:  A. RR LESS THAN 10 breaths per minute, B. Oxygen saturation LESS THAN 90%, C. Sedation score of 6  ondansetron Injectable 4 milliGRAM(s) IV Push every 6 hours PRN Nausea  traMADol 25 milliGRAM(s) Oral every 4 hours PRN Moderate Pain (4 - 6)  traMADol 50 milliGRAM(s) Oral every 4 hours PRN Severe Pain (7 - 10)    Pertinent Physical Exam  I&O's Summary    04 Jun 2022 07:01  -  05 Jun 2022 07:00  --------------------------------------------------------  IN: 0 mL / OUT: 0 mL / NET: 0 mL    05 Jun 2022 07:01  -  05 Jun 2022 12:46  --------------------------------------------------------  IN: 0 mL / OUT: 60 mL / NET: -60 mL  e     Assessment  67y Female  w/ PAST MEDICAL & SURGICAL HISTORY:  Emphysema lung      Shingles      Renal calculi      Pulmonary nodule      Former smoker      History of laparoscopic cholecystectomy  1995      S/P cystoscopy  with right renal stent insertion 5/8/18      Elective surgery  hernia repair 2014 - with mesh      admitted with complaints of Patient is a 67y old  Female who presents with a chief complaint of Lung surgery (01 Jun 2022 11:47)  Pt is a 67 yr old female scheduled for Flexible Bronchoscopy Left Video Assisted Thoracoscopy Left Upper Lobectomy Poss Lingular Sparing with Dr Villegas tentatively 5/25/22 - pt former smoker and had CT scan where pulmonary nodule was noted - pt denies pain or cough - some mild SOB with exertion   Patient instructed to contact surgeon's office concerning COVID test prior to surgery    (19 May 2022 10:00)     PROCEDURES:       Uniportal Left VATS TRISTAN Lobectomy 5/25/2022 Uniportal Left VATS TRISTAN Lobectomy 5/25/2022         ISSUES:                  Lung nodule                  Postop pain                  Chest tube in place  COPD, severe  LLL atelectasis  Hx of kidney stones s/p R ureter stent (5/2018)   Hx of shingles              Former smoker  Post op pt with significant hypoxemia. SOB. Also large air leak in Chest tube. 5/30 pt improving. Remaining on bronchodilators. Steriods changed to Medrol dose pack. 5/31-CT to waterseal last night, CT stable. Still has expir AL. O2 requirement down to 3LNC. Blood patch done. 6/1- PTX noted to be worse. CT back to -10cm suction. 6/4-Still AL, still on -10cm sxn. Bleo pleurodesis done at bedside.       PLAN  Neuro: Pain management  Pulm: Encourage coughing, deep breathing and use of incentive spirometry.  Daily CXR.   Cardio: Monitor telemetry/alarms  GI: Tolerating diet. Continue stool softeners.  Renal: monitor urine output, supplement electrolytes as needed  Vasc: Heparin SC/SCDs for DVT prophylaxis  Heme: Stable H/H. .   ID: Off antibiotics. Stable. Leukocytosis likely related to pleurodesis. pt afebrile.   Therapy: OOB/ambulate  Tubes: Monitor Chest tube output and air leak. Keep to suction today.   Disposition: Aim to D/C to home once CT removed.   Discussed with Cardiothoracic Team at AM rounds.

## 2022-06-06 LAB
ANION GAP SERPL CALC-SCNC: 9 MMOL/L — SIGNIFICANT CHANGE UP (ref 7–14)
BUN SERPL-MCNC: 17 MG/DL — SIGNIFICANT CHANGE UP (ref 7–23)
CALCIUM SERPL-MCNC: 9.4 MG/DL — SIGNIFICANT CHANGE UP (ref 8.4–10.5)
CHLORIDE SERPL-SCNC: 97 MMOL/L — LOW (ref 98–107)
CO2 SERPL-SCNC: 31 MMOL/L — SIGNIFICANT CHANGE UP (ref 22–31)
CREAT SERPL-MCNC: 0.55 MG/DL — SIGNIFICANT CHANGE UP (ref 0.5–1.3)
EGFR: 101 ML/MIN/1.73M2 — SIGNIFICANT CHANGE UP
GLUCOSE SERPL-MCNC: 110 MG/DL — HIGH (ref 70–99)
HCT VFR BLD CALC: 38.5 % — SIGNIFICANT CHANGE UP (ref 34.5–45)
HGB BLD-MCNC: 12.6 G/DL — SIGNIFICANT CHANGE UP (ref 11.5–15.5)
MAGNESIUM SERPL-MCNC: 2 MG/DL — SIGNIFICANT CHANGE UP (ref 1.6–2.6)
MCHC RBC-ENTMCNC: 30.3 PG — SIGNIFICANT CHANGE UP (ref 27–34)
MCHC RBC-ENTMCNC: 32.7 GM/DL — SIGNIFICANT CHANGE UP (ref 32–36)
MCV RBC AUTO: 92.5 FL — SIGNIFICANT CHANGE UP (ref 80–100)
NRBC # BLD: 0 /100 WBCS — SIGNIFICANT CHANGE UP
NRBC # FLD: 0 K/UL — SIGNIFICANT CHANGE UP
PLATELET # BLD AUTO: 214 K/UL — SIGNIFICANT CHANGE UP (ref 150–400)
POTASSIUM SERPL-MCNC: 4.2 MMOL/L — SIGNIFICANT CHANGE UP (ref 3.5–5.3)
POTASSIUM SERPL-SCNC: 4.2 MMOL/L — SIGNIFICANT CHANGE UP (ref 3.5–5.3)
RBC # BLD: 4.16 M/UL — SIGNIFICANT CHANGE UP (ref 3.8–5.2)
RBC # FLD: 11.9 % — SIGNIFICANT CHANGE UP (ref 10.3–14.5)
SODIUM SERPL-SCNC: 137 MMOL/L — SIGNIFICANT CHANGE UP (ref 135–145)
WBC # BLD: 9.61 K/UL — SIGNIFICANT CHANGE UP (ref 3.8–10.5)
WBC # FLD AUTO: 9.61 K/UL — SIGNIFICANT CHANGE UP (ref 3.8–10.5)

## 2022-06-06 PROCEDURE — 71045 X-RAY EXAM CHEST 1 VIEW: CPT | Mod: 26

## 2022-06-06 PROCEDURE — 71045 X-RAY EXAM CHEST 1 VIEW: CPT | Mod: 26,77

## 2022-06-06 RX ORDER — SODIUM CHLORIDE 9 MG/ML
500 INJECTION, SOLUTION INTRAVENOUS ONCE
Refills: 0 | Status: COMPLETED | OUTPATIENT
Start: 2022-06-06 | End: 2022-06-06

## 2022-06-06 RX ADMIN — Medication 0.5 MILLIGRAM(S): at 20:21

## 2022-06-06 RX ADMIN — SODIUM CHLORIDE 500 MILLILITER(S): 9 INJECTION, SOLUTION INTRAVENOUS at 19:43

## 2022-06-06 RX ADMIN — DORNASE ALFA 2.5 MILLIGRAM(S): 1 SOLUTION RESPIRATORY (INHALATION) at 10:47

## 2022-06-06 RX ADMIN — TRAMADOL HYDROCHLORIDE 50 MILLIGRAM(S): 50 TABLET ORAL at 04:00

## 2022-06-06 RX ADMIN — SODIUM CHLORIDE 4 MILLILITER(S): 9 INJECTION INTRAMUSCULAR; INTRAVENOUS; SUBCUTANEOUS at 15:39

## 2022-06-06 RX ADMIN — FAMOTIDINE 20 MILLIGRAM(S): 10 INJECTION INTRAVENOUS at 18:34

## 2022-06-06 RX ADMIN — LEVALBUTEROL 0.63 MILLIGRAM(S): 1.25 SOLUTION, CONCENTRATE RESPIRATORY (INHALATION) at 15:39

## 2022-06-06 RX ADMIN — TRAMADOL HYDROCHLORIDE 50 MILLIGRAM(S): 50 TABLET ORAL at 03:10

## 2022-06-06 RX ADMIN — Medication 500 MICROGRAM(S): at 10:46

## 2022-06-06 RX ADMIN — POLYETHYLENE GLYCOL 3350 17 GRAM(S): 17 POWDER, FOR SOLUTION ORAL at 06:05

## 2022-06-06 RX ADMIN — LIDOCAINE 1 PATCH: 4 CREAM TOPICAL at 00:01

## 2022-06-06 RX ADMIN — TRAMADOL HYDROCHLORIDE 50 MILLIGRAM(S): 50 TABLET ORAL at 21:40

## 2022-06-06 RX ADMIN — TRAMADOL HYDROCHLORIDE 50 MILLIGRAM(S): 50 TABLET ORAL at 13:06

## 2022-06-06 RX ADMIN — ONDANSETRON 4 MILLIGRAM(S): 8 TABLET, FILM COATED ORAL at 22:13

## 2022-06-06 RX ADMIN — ONDANSETRON 4 MILLIGRAM(S): 8 TABLET, FILM COATED ORAL at 13:47

## 2022-06-06 RX ADMIN — Medication 500 MICROGRAM(S): at 04:39

## 2022-06-06 RX ADMIN — Medication 650 MILLIGRAM(S): at 12:37

## 2022-06-06 RX ADMIN — Medication 5 MILLIGRAM(S): at 12:37

## 2022-06-06 RX ADMIN — Medication 650 MILLIGRAM(S): at 18:34

## 2022-06-06 RX ADMIN — SENNA PLUS 2 TABLET(S): 8.6 TABLET ORAL at 21:06

## 2022-06-06 RX ADMIN — Medication 650 MILLIGRAM(S): at 06:05

## 2022-06-06 RX ADMIN — Medication 0.5 MILLIGRAM(S): at 10:47

## 2022-06-06 RX ADMIN — TRAMADOL HYDROCHLORIDE 50 MILLIGRAM(S): 50 TABLET ORAL at 08:27

## 2022-06-06 RX ADMIN — HEPARIN SODIUM 5000 UNIT(S): 5000 INJECTION INTRAVENOUS; SUBCUTANEOUS at 21:08

## 2022-06-06 RX ADMIN — HEPARIN SODIUM 5000 UNIT(S): 5000 INJECTION INTRAVENOUS; SUBCUTANEOUS at 06:07

## 2022-06-06 RX ADMIN — FAMOTIDINE 20 MILLIGRAM(S): 10 INJECTION INTRAVENOUS at 06:06

## 2022-06-06 RX ADMIN — SODIUM CHLORIDE 4 MILLILITER(S): 9 INJECTION INTRAMUSCULAR; INTRAVENOUS; SUBCUTANEOUS at 04:40

## 2022-06-06 RX ADMIN — LEVALBUTEROL 0.63 MILLIGRAM(S): 1.25 SOLUTION, CONCENTRATE RESPIRATORY (INHALATION) at 10:46

## 2022-06-06 RX ADMIN — SODIUM CHLORIDE 4 MILLILITER(S): 9 INJECTION INTRAMUSCULAR; INTRAVENOUS; SUBCUTANEOUS at 20:07

## 2022-06-06 RX ADMIN — LEVALBUTEROL 0.63 MILLIGRAM(S): 1.25 SOLUTION, CONCENTRATE RESPIRATORY (INHALATION) at 04:39

## 2022-06-06 RX ADMIN — HEPARIN SODIUM 5000 UNIT(S): 5000 INJECTION INTRAVENOUS; SUBCUTANEOUS at 13:57

## 2022-06-06 RX ADMIN — SODIUM CHLORIDE 4 MILLILITER(S): 9 INJECTION INTRAMUSCULAR; INTRAVENOUS; SUBCUTANEOUS at 10:46

## 2022-06-06 RX ADMIN — LEVALBUTEROL 0.63 MILLIGRAM(S): 1.25 SOLUTION, CONCENTRATE RESPIRATORY (INHALATION) at 20:07

## 2022-06-06 RX ADMIN — TRAMADOL HYDROCHLORIDE 50 MILLIGRAM(S): 50 TABLET ORAL at 08:57

## 2022-06-06 RX ADMIN — TRAMADOL HYDROCHLORIDE 50 MILLIGRAM(S): 50 TABLET ORAL at 21:06

## 2022-06-06 RX ADMIN — Medication 500 MICROGRAM(S): at 20:07

## 2022-06-06 RX ADMIN — TRAMADOL HYDROCHLORIDE 50 MILLIGRAM(S): 50 TABLET ORAL at 00:00

## 2022-06-06 RX ADMIN — Medication 500 MICROGRAM(S): at 15:39

## 2022-06-06 RX ADMIN — Medication 650 MILLIGRAM(S): at 06:18

## 2022-06-06 RX ADMIN — Medication 650 MILLIGRAM(S): at 13:07

## 2022-06-06 RX ADMIN — POLYETHYLENE GLYCOL 3350 17 GRAM(S): 17 POWDER, FOR SOLUTION ORAL at 18:34

## 2022-06-06 RX ADMIN — TRAMADOL HYDROCHLORIDE 50 MILLIGRAM(S): 50 TABLET ORAL at 12:36

## 2022-06-06 NOTE — PROGRESS NOTE ADULT - SUBJECTIVE AND OBJECTIVE BOX
Subjective: "I'm feeling much better today" Pt states pain improved. No obvious distress. OOB w assist. Using IS. No CP. Remains  on O2 3L NC    Vital Signs:  Vital Signs Last 24 Hrs  T(C): 36.6 (06-06-22 @ 08:05), Max: 37.1 (06-06-22 @ 01:30)  T(F): 97.8 (06-06-22 @ 08:05), Max: 98.8 (06-06-22 @ 06:00)  HR: 107 (06-06-22 @ 08:05) (78 - 853)  BP: 100/75 (06-06-22 @ 08:05) (96/66 - 110/67)  RR: 18 (06-06-22 @ 08:05) (16 - 18)  SpO2: 95% (06-06-22 @ 08:05) (93% - 99%) on (O2)    Telemetry/Alarms: tele SR. occasional PVCs  General: WN/WD NAD  Neurology: Awake, nonfocal, HUSAIN x 4  Eyes: Scleras clear, PERRLA/ EOMI, Gross vision intact  ENT:Gross hearing intact, grossly patent pharynx, no stridor  Neck: Neck supple, trachea midline, No JVD,   Respiratory: Dec BS bilat  CV: RRR, S1S2, no murmurs, rubs or gallops  Abdominal: Soft, NT, ND +BS, BM 2 days ago. +flatus  Extremities: No edema, + peripheral pulses  Skin: No Rashes, Hematoma, Ecchymosis  Lymphatic: No Neck, axilla, groin LAD  Psych: Oriented x 3, normal affect  Incisions: left VATS c/d/i  Tubes: left CT- 60cc/24hrs on -10cm sxn, no air leak seen.   Relevant labs, radiology and Medications reviewed           CXR no obvious ptx             12.6   9.61  )-----------( 214      ( 06 Jun 2022 05:33 )             38.5     06-06    137  |  97<L>  |  17  ----------------------------<  110<H>  4.2   |  31  |  0.55    Ca    9.4      06 Jun 2022 05:33  Mg     2.00     06-06        MEDICATIONS  (STANDING):  acetaminophen     Tablet .. 650 milliGRAM(s) Oral every 6 hours  acetaminophen   IVPB .. 900 milliGRAM(s) IV Intermittent once  bisacodyl 5 milliGRAM(s) Oral daily  bleomycin PF IntraPleural (Non - oncologic) 60 Unit(s) IntraPleural. once  buDESOnide    Inhalation Suspension 0.5 milliGRAM(s) Inhalation every 12 hours  dornase marco Solution 2.5 milliGRAM(s) Inhalation daily  famotidine    Tablet 20 milliGRAM(s) Oral two times a day  heparin   Injectable 5000 Unit(s) SubCutaneous every 8 hours  HYDROmorphone  Injectable 0.25 milliGRAM(s) IV Push once  ipratropium    for Nebulization 500 MICROGram(s) Nebulizer every 6 hours  levalbuterol Inhalation 0.63 milliGRAM(s) Inhalation every 6 hours  lidocaine   4% Patch 1 Patch Transdermal daily  lidocaine 1%/epinephrine 1:200,000 Inj 20 milliLiter(s) Local Injection once  polyethylene glycol 3350 17 Gram(s) Oral two times a day  senna 2 Tablet(s) Oral at bedtime  sodium chloride 3%  Inhalation 4 milliLiter(s) Inhalation every 6 hours    MEDICATIONS  (PRN):  naloxone Injectable 0.1 milliGRAM(s) IV Push every 3 minutes PRN For ANY of the following changes in patient status:  A. RR LESS THAN 10 breaths per minute, B. Oxygen saturation LESS THAN 90%, C. Sedation score of 6  ondansetron Injectable 4 milliGRAM(s) IV Push every 6 hours PRN Nausea  traMADol 25 milliGRAM(s) Oral every 4 hours PRN Moderate Pain (4 - 6)  traMADol 50 milliGRAM(s) Oral every 4 hours PRN Severe Pain (7 - 10)    Pertinent Physical Exam  I&O's Summary    05 Jun 2022 07:01  -  06 Jun 2022 07:00  --------------------------------------------------------  IN: 630 mL / OUT: 760 mL / NET: -130 mL    06 Jun 2022 07:01  -  06 Jun 2022 09:13  --------------------------------------------------------  IN: 150 mL / OUT: 200 mL / NET: -50 mL        Assessment  67y Female  w/ PAST MEDICAL & SURGICAL HISTORY:  Emphysema lung      Shingles      Renal calculi      Pulmonary nodule      Former smoker      History of laparoscopic cholecystectomy  1995      S/P cystoscopy  with right renal stent insertion 5/8/18      Elective surgery  hernia repair 2014 - with mesh      admitted with complaints of Patient is a 67y old  Female who presents with a chief complaint of Lung surgery (01 Jun 2022 11:47)  Pt is a 67 yr old female scheduled for Flexible Bronchoscopy Left Video Assisted Thoracoscopy Left Upper Lobectomy Poss Lingular Sparing with Dr Villegas tentatively 5/25/22 - pt former smoker and had CT scan where pulmonary nodule was noted - pt denies pain or cough - some mild SOB with exertion   Patient instructed to contact surgeon's office concerning COVID test prior to surgery    (19 May 2022 10:00)     PROCEDURES:       Uniportal Left VATS TRISTAN Lobectomy 5/25/2022 Uniportal Left VATS TRISTAN Lobectomy 5/25/2022         ISSUES:                  Lung nodule                  Postop pain                  Chest tube in place  COPD, severe  LLL atelectasis  Hx of kidney stones s/p R ureter stent (5/2018)   Hx of shingles              Former smoker  Post op pt with significant hypoxemia. SOB. Also large air leak in Chest tube. 5/30 pt improving. Remaining on bronchodilators. Steriods changed to Medrol dose pack. 5/31-CT to waterseal last night, CT stable. Still has expir AL. O2 requirement down to 3LNC. Blood patch done. 6/1- PTX noted to be worse. CT back to -10cm suction. 6/4-Still AL, still on -10cm sxn. Bleo pleurodesis done at bedside. 6/5 Air leak improved  PLAN  Neuro: Pain management  Pulm: Encourage coughing, deep breathing and use of incentive spirometry.. Daily CXR.   Cardio: Monitor telemetry/alarms  GI: Tolerating diet. Continue stool softeners.  Renal: monitor urine output, supplement electrolytes as needed  Vasc: Heparin SC/SCDs for DVT prophylaxis  Heme: Stable H/H. .   ID: Off antibiotics. Stable.  Therapy: OOB/ambulate  Tubes: Monitor Chest tube output, no air leak seen. Will go to waterseal and rpt CXR in 4hrs.   Disposition: Aim to D/C to home once CT removed.   Discussed with Cardiothoracic Team at AM rounds.

## 2022-06-06 NOTE — PROVIDER CONTACT NOTE (CHANGE IN STATUS NOTIFICATION) - ACTION/TREATMENT ORDERED:
CT team Issac Bermudez [id number] made aware, arrived to bedside, IV Lactated Ringers bolus x1 ordered.
CT NP Alethea Gilliam made aware, advised to given zofran x1 IVP and continue to monitor, Zofran administered x1, nausea subsided. safety maintained. Will continue to monitor

## 2022-06-06 NOTE — PROVIDER CONTACT NOTE (CHANGE IN STATUS NOTIFICATION) - SITUATION
patient sinus tachycardia 120s on monitor, patient sitting in chair, asymptomatic
patient c/o of nausea & lightheadedness while sitting OOB to chair, Sinus tachycardia on monitor 110s; patient denies chest pain, shortness of breath, headache, dizziness, palpitations, vomiting; other vital signs stable

## 2022-06-06 NOTE — PROVIDER CONTACT NOTE (CHANGE IN STATUS NOTIFICATION) - ASSESSMENT
patient c/o of nausea & lightheadedness while sitting OOB to chair, Sinus tachycardia on monitor 110s; patient denies chest pain, shortness of breath, headache, dizziness, palpitations.
asymptomatic, denies pain

## 2022-06-06 NOTE — PROGRESS NOTE ADULT - REASON FOR ADMISSION
LVATS, TRISTAN  lobectomy
Lung surgery
s/p LVATS/LULobectomy
Lung surgery
LUng surgery
s/p LVATS/LULobectomy

## 2022-06-07 ENCOUNTER — TRANSCRIPTION ENCOUNTER (OUTPATIENT)
Age: 67
End: 2022-06-07

## 2022-06-07 PROCEDURE — 71045 X-RAY EXAM CHEST 1 VIEW: CPT | Mod: 26

## 2022-06-07 RX ORDER — ACETAMINOPHEN 500 MG
2 TABLET ORAL
Qty: 0 | Refills: 0 | DISCHARGE
Start: 2022-06-07

## 2022-06-07 RX ORDER — POLYETHYLENE GLYCOL 3350 17 G/17G
17 POWDER, FOR SOLUTION ORAL
Qty: 0 | Refills: 0 | DISCHARGE
Start: 2022-06-07

## 2022-06-07 RX ORDER — TRAMADOL HYDROCHLORIDE 50 MG/1
0.5 TABLET ORAL
Qty: 15 | Refills: 0
Start: 2022-06-07 | End: 2022-06-11

## 2022-06-07 RX ORDER — SENNA PLUS 8.6 MG/1
2 TABLET ORAL
Qty: 0 | Refills: 0 | DISCHARGE
Start: 2022-06-07

## 2022-06-07 RX ADMIN — Medication 650 MILLIGRAM(S): at 14:02

## 2022-06-07 RX ADMIN — HEPARIN SODIUM 5000 UNIT(S): 5000 INJECTION INTRAVENOUS; SUBCUTANEOUS at 22:37

## 2022-06-07 RX ADMIN — LEVALBUTEROL 0.63 MILLIGRAM(S): 1.25 SOLUTION, CONCENTRATE RESPIRATORY (INHALATION) at 10:06

## 2022-06-07 RX ADMIN — Medication 500 MICROGRAM(S): at 10:06

## 2022-06-07 RX ADMIN — SENNA PLUS 2 TABLET(S): 8.6 TABLET ORAL at 22:34

## 2022-06-07 RX ADMIN — LEVALBUTEROL 0.63 MILLIGRAM(S): 1.25 SOLUTION, CONCENTRATE RESPIRATORY (INHALATION) at 20:26

## 2022-06-07 RX ADMIN — TRAMADOL HYDROCHLORIDE 25 MILLIGRAM(S): 50 TABLET ORAL at 17:40

## 2022-06-07 RX ADMIN — Medication 650 MILLIGRAM(S): at 06:02

## 2022-06-07 RX ADMIN — Medication 0.5 MILLIGRAM(S): at 10:07

## 2022-06-07 RX ADMIN — TRAMADOL HYDROCHLORIDE 25 MILLIGRAM(S): 50 TABLET ORAL at 16:46

## 2022-06-07 RX ADMIN — SODIUM CHLORIDE 4 MILLILITER(S): 9 INJECTION INTRAMUSCULAR; INTRAVENOUS; SUBCUTANEOUS at 15:53

## 2022-06-07 RX ADMIN — Medication 650 MILLIGRAM(S): at 00:15

## 2022-06-07 RX ADMIN — LEVALBUTEROL 0.63 MILLIGRAM(S): 1.25 SOLUTION, CONCENTRATE RESPIRATORY (INHALATION) at 15:53

## 2022-06-07 RX ADMIN — HEPARIN SODIUM 5000 UNIT(S): 5000 INJECTION INTRAVENOUS; SUBCUTANEOUS at 06:02

## 2022-06-07 RX ADMIN — Medication 0.5 MILLIGRAM(S): at 20:27

## 2022-06-07 RX ADMIN — TRAMADOL HYDROCHLORIDE 50 MILLIGRAM(S): 50 TABLET ORAL at 04:28

## 2022-06-07 RX ADMIN — DORNASE ALFA 2.5 MILLIGRAM(S): 1 SOLUTION RESPIRATORY (INHALATION) at 10:07

## 2022-06-07 RX ADMIN — ONDANSETRON 4 MILLIGRAM(S): 8 TABLET, FILM COATED ORAL at 09:33

## 2022-06-07 RX ADMIN — Medication 500 MICROGRAM(S): at 20:28

## 2022-06-07 RX ADMIN — FAMOTIDINE 20 MILLIGRAM(S): 10 INJECTION INTRAVENOUS at 18:02

## 2022-06-07 RX ADMIN — Medication 650 MILLIGRAM(S): at 18:02

## 2022-06-07 RX ADMIN — HEPARIN SODIUM 5000 UNIT(S): 5000 INJECTION INTRAVENOUS; SUBCUTANEOUS at 14:01

## 2022-06-07 RX ADMIN — TRAMADOL HYDROCHLORIDE 50 MILLIGRAM(S): 50 TABLET ORAL at 22:34

## 2022-06-07 RX ADMIN — TRAMADOL HYDROCHLORIDE 25 MILLIGRAM(S): 50 TABLET ORAL at 17:42

## 2022-06-07 RX ADMIN — Medication 650 MILLIGRAM(S): at 00:55

## 2022-06-07 RX ADMIN — TRAMADOL HYDROCHLORIDE 50 MILLIGRAM(S): 50 TABLET ORAL at 23:51

## 2022-06-07 RX ADMIN — SODIUM CHLORIDE 4 MILLILITER(S): 9 INJECTION INTRAMUSCULAR; INTRAVENOUS; SUBCUTANEOUS at 20:26

## 2022-06-07 RX ADMIN — Medication 500 MICROGRAM(S): at 15:53

## 2022-06-07 RX ADMIN — LEVALBUTEROL 0.63 MILLIGRAM(S): 1.25 SOLUTION, CONCENTRATE RESPIRATORY (INHALATION) at 04:03

## 2022-06-07 RX ADMIN — FAMOTIDINE 20 MILLIGRAM(S): 10 INJECTION INTRAVENOUS at 06:02

## 2022-06-07 RX ADMIN — Medication 500 MICROGRAM(S): at 04:03

## 2022-06-07 RX ADMIN — Medication 650 MILLIGRAM(S): at 07:37

## 2022-06-07 RX ADMIN — SODIUM CHLORIDE 4 MILLILITER(S): 9 INJECTION INTRAMUSCULAR; INTRAVENOUS; SUBCUTANEOUS at 04:03

## 2022-06-07 RX ADMIN — TRAMADOL HYDROCHLORIDE 50 MILLIGRAM(S): 50 TABLET ORAL at 05:04

## 2022-06-07 RX ADMIN — SODIUM CHLORIDE 4 MILLILITER(S): 9 INJECTION INTRAMUSCULAR; INTRAVENOUS; SUBCUTANEOUS at 10:07

## 2022-06-07 RX ADMIN — TRAMADOL HYDROCHLORIDE 25 MILLIGRAM(S): 50 TABLET ORAL at 09:41

## 2022-06-07 NOTE — DISCHARGE NOTE NURSING/CASE MANAGEMENT/SOCIAL WORK - NSDCPEFALRISK_GEN_ALL_CORE
For information on Fall & Injury Prevention, visit: [email or web address] OR  [email or web address] OR  https://www.cdc.gov/steadi/patient.html

## 2022-06-07 NOTE — DISCHARGE NOTE NURSING/CASE MANAGEMENT/SOCIAL WORK - PATIENT PORTAL LINK FT
You can access the FollowMyHealth Patient Portal offered by [facility] by registering at the following website: [email or web address]. By joining [facility]’s FollowMyHealth portal, you will also be able to view your health information using other applications (apps) compatible with our system.

## 2022-06-07 NOTE — DISCHARGE NOTE NURSING/CASE MANAGEMENT/SOCIAL WORK - NSDCFUADDAPPT_GEN_ALL_CORE_FT
See Dr Villegas in 2 weeks- call for an appointment and bring a new chest X-ray with you when you come.

## 2022-06-07 NOTE — PROGRESS NOTE ADULT - SUBJECTIVE AND OBJECTIVE BOX
Subjective: Patient claims she is "feeling okay". Had mild discomfort with difficulty breathing after chest tube removal but has since resolved. Patient would like to go home. Patient without CP, SOB, sputum production, fevers, or chills.    Vital Signs:  Vital Signs Last 24 Hrs  T(C): 36.3 (06-07-22 @ 11:33), Max: 37.1 (06-06-22 @ 21:39)  T(F): 97.4 (06-07-22 @ 11:33), Max: 98.8 (06-06-22 @ 21:39)  HR: 113 (06-07-22 @ 11:33) (81 - 113)  BP: 101/62 (06-07-22 @ 11:33) (94/67 - 106/67)  RR: 18 (06-07-22 @ 11:33) (17 - 18)  SpO2: 92% (06-07-22 @ 11:33) (92% - 99%) on (O2)    Telemetry/Alarms: Mild sinus tachycardia at 115  General: WN/WD NAD  Neurology: Awake, nonfocal, HUSAIN x 4  Eyes: Scleras clear, PERRLA/ EOMI, Gross vision intact  ENT: Gross hearing intact, grossly patent pharynx, no stridor  Neck: Neck supple, trachea midline, No JVD,   Respiratory: CTA B/L, No wheezing, rales, rhonchi  CV: RRR, S1S2, no murmurs, rubs or gallops  Abdominal: Soft, NT, ND +BS,   Extremities: No edema, + peripheral pulses  Skin: No Rashes, Hematoma, Ecchymosis  Lymphatic: No Neck, axilla, groin LAD  Psych: Oriented x 3, normal affect  Incisions: L Thoracostomy c,d,i.  Tubes: none - chest tube removed today  Relevant labs, radiology and Medications reviewed                        12.6   9.61  )-----------( 214      ( 06 Jun 2022 05:33 )             38.5     06-06    137  |  97<L>  |  17  ----------------------------<  110<H>  4.2   |  31  |  0.55    Ca    9.4      06 Jun 2022 05:33  Mg     2.00     06-06        MEDICATIONS  (STANDING):  acetaminophen     Tablet .. 650 milliGRAM(s) Oral every 6 hours  acetaminophen   IVPB .. 900 milliGRAM(s) IV Intermittent once  bisacodyl 5 milliGRAM(s) Oral daily  bleomycin PF IntraPleural (Non - oncologic) 60 Unit(s) IntraPleural. once  buDESOnide    Inhalation Suspension 0.5 milliGRAM(s) Inhalation every 12 hours  dornase marco Solution 2.5 milliGRAM(s) Inhalation daily  famotidine    Tablet 20 milliGRAM(s) Oral two times a day  heparin   Injectable 5000 Unit(s) SubCutaneous every 8 hours  HYDROmorphone  Injectable 0.25 milliGRAM(s) IV Push once  ipratropium    for Nebulization 500 MICROGram(s) Nebulizer every 6 hours  levalbuterol Inhalation 0.63 milliGRAM(s) Inhalation every 6 hours  lidocaine   4% Patch 1 Patch Transdermal daily  lidocaine 1%/epinephrine 1:200,000 Inj 20 milliLiter(s) Local Injection once  polyethylene glycol 3350 17 Gram(s) Oral two times a day  senna 2 Tablet(s) Oral at bedtime  sodium chloride 3%  Inhalation 4 milliLiter(s) Inhalation every 6 hours    MEDICATIONS  (PRN):  naloxone Injectable 0.1 milliGRAM(s) IV Push every 3 minutes PRN For ANY of the following changes in patient status:  A. RR LESS THAN 10 breaths per minute, B. Oxygen saturation LESS THAN 90%, C. Sedation score of 6  ondansetron Injectable 4 milliGRAM(s) IV Push every 6 hours PRN Nausea  traMADol 25 milliGRAM(s) Oral every 4 hours PRN Moderate Pain (4 - 6)  traMADol 50 milliGRAM(s) Oral every 4 hours PRN Severe Pain (7 - 10)    Pertinent Physical Exam  I&O's Summary    06 Jun 2022 07:01  -  07 Jun 2022 07:00  --------------------------------------------------------  IN: 900 mL / OUT: 1550 mL / NET: -650 mL    07 Jun 2022 07:01  -  07 Jun 2022 12:20  --------------------------------------------------------  IN: 0 mL / OUT: 250 mL / NET: -250 mL      67y Female  w/ PAST MEDICAL & SURGICAL HISTORY:  Emphysema lung    Shingles    Renal calculi    Pulmonary nodule    Former smoker    History of laparoscopic cholecystectomy  1995    S/P cystoscopy  with right renal stent insertion 5/8/18    Elective surgery  hernia repair 2014 - with mesh    Assessment:  67y old  Female who presents with a chief complaint of Lung surgery (06 Jun 2022 09:12), patient underwent VATS, with TRISTAN lobectomy and MLND.    Postoperative course/issues:  Patient had L chest tube removed today, 6/7. F/U CXR found slight increase in L sided PTX (from 3rd intercostal space to 4th).    PLAN  Neuro: Pain management  Pulm: Encourage coughing, deep breathing and use of incentive spirometry. Wean off supplemental oxygen as able. L Chest tube removed, repeat CXR to follow PTX severity.  Cardio: Monitor telemetry/alarms  GI: Tolerating diet. Continue stool softeners.  Renal: monitor urine output, supplement electrolytes as needed  Vasc: Heparin SC/SCDs for DVT prophylaxis  Heme: Stable H/H. .   ID: Off antibiotics. Stable.  Therapy: OOB/ambulate  Tubes: None  Disposition: f/u CXR, if stable with no complications, aim to discharge later today.  Discussed with Cardiothoracic Team at AM rounds.

## 2022-06-07 NOTE — PROGRESS NOTE ADULT - PROVIDER SPECIALTY LIST ADULT
CT Surgery
CT Surgery
Critical Care
Critical Care
Pain Medicine
Thoracic Surgery
Anesthesia
CT Surgery
CT Surgery
Critical Care
Pain Medicine
Thoracic Surgery

## 2022-06-07 NOTE — DISCHARGE NOTE NURSING/CASE MANAGEMENT/SOCIAL WORK - NSSCNAMETXT_GEN_ALL_CORE
[facility] [place] [phone] initial visit will be day after discharge home. A nurse will call prior to the home visit.

## 2022-06-08 VITALS
TEMPERATURE: 98 F | OXYGEN SATURATION: 96 % | SYSTOLIC BLOOD PRESSURE: 111 MMHG | DIASTOLIC BLOOD PRESSURE: 67 MMHG | RESPIRATION RATE: 17 BRPM | HEART RATE: 111 BPM

## 2022-06-08 PROCEDURE — 71045 X-RAY EXAM CHEST 1 VIEW: CPT | Mod: 26

## 2022-06-08 RX ORDER — ONDANSETRON 8 MG/1
1 TABLET, FILM COATED ORAL
Qty: 21 | Refills: 0
Start: 2022-06-08 | End: 2022-06-14

## 2022-06-08 RX ORDER — IPRATROPIUM BROMIDE 0.2 MG/ML
2.5 SOLUTION, NON-ORAL INHALATION
Qty: 140 | Refills: 0
Start: 2022-06-08 | End: 2022-06-21

## 2022-06-08 RX ADMIN — FAMOTIDINE 20 MILLIGRAM(S): 10 INJECTION INTRAVENOUS at 06:42

## 2022-06-08 RX ADMIN — HEPARIN SODIUM 5000 UNIT(S): 5000 INJECTION INTRAVENOUS; SUBCUTANEOUS at 06:40

## 2022-06-08 RX ADMIN — Medication 650 MILLIGRAM(S): at 12:17

## 2022-06-08 RX ADMIN — Medication 500 MICROGRAM(S): at 09:57

## 2022-06-08 RX ADMIN — SODIUM CHLORIDE 4 MILLILITER(S): 9 INJECTION INTRAMUSCULAR; INTRAVENOUS; SUBCUTANEOUS at 03:32

## 2022-06-08 RX ADMIN — LEVALBUTEROL 0.63 MILLIGRAM(S): 1.25 SOLUTION, CONCENTRATE RESPIRATORY (INHALATION) at 03:32

## 2022-06-08 RX ADMIN — Medication 500 MICROGRAM(S): at 15:34

## 2022-06-08 RX ADMIN — Medication 650 MILLIGRAM(S): at 08:26

## 2022-06-08 RX ADMIN — SODIUM CHLORIDE 4 MILLILITER(S): 9 INJECTION INTRAMUSCULAR; INTRAVENOUS; SUBCUTANEOUS at 15:34

## 2022-06-08 RX ADMIN — Medication 500 MICROGRAM(S): at 03:32

## 2022-06-08 RX ADMIN — Medication 650 MILLIGRAM(S): at 13:00

## 2022-06-08 RX ADMIN — Medication 650 MILLIGRAM(S): at 06:40

## 2022-06-08 RX ADMIN — LEVALBUTEROL 0.63 MILLIGRAM(S): 1.25 SOLUTION, CONCENTRATE RESPIRATORY (INHALATION) at 15:36

## 2022-06-08 RX ADMIN — Medication 0.5 MILLIGRAM(S): at 09:57

## 2022-06-08 RX ADMIN — Medication 650 MILLIGRAM(S): at 01:18

## 2022-06-08 RX ADMIN — LEVALBUTEROL 0.63 MILLIGRAM(S): 1.25 SOLUTION, CONCENTRATE RESPIRATORY (INHALATION) at 10:00

## 2022-06-08 RX ADMIN — Medication 650 MILLIGRAM(S): at 02:51

## 2022-06-08 RX ADMIN — TRAMADOL HYDROCHLORIDE 25 MILLIGRAM(S): 50 TABLET ORAL at 17:41

## 2022-06-08 RX ADMIN — Medication 5 MILLIGRAM(S): at 12:17

## 2022-06-08 RX ADMIN — SODIUM CHLORIDE 4 MILLILITER(S): 9 INJECTION INTRAMUSCULAR; INTRAVENOUS; SUBCUTANEOUS at 09:58

## 2022-06-08 NOTE — CHART NOTE - NSCHARTNOTEFT_GEN_A_CORE
Pt with known COPD. Pt O2 sat on RA at rest 87% with some SOB. 3LNC applied and O2 sat improved to 95%. O2 sat with 3LNC with pt ambulating 91%. Pt will require home oxygen.

## 2022-06-13 PROBLEM — Z87.891 PERSONAL HISTORY OF NICOTINE DEPENDENCE: Chronic | Status: ACTIVE | Noted: 2022-05-19

## 2022-06-13 PROBLEM — R91.1 SOLITARY PULMONARY NODULE: Chronic | Status: ACTIVE | Noted: 2022-05-19

## 2022-06-14 ENCOUNTER — APPOINTMENT (OUTPATIENT)
Dept: THORACIC SURGERY | Facility: CLINIC | Age: 67
End: 2022-06-14
Payer: MEDICARE

## 2022-06-14 VITALS
BODY MASS INDEX: 20.83 KG/M2 | OXYGEN SATURATION: 93 % | RESPIRATION RATE: 18 BRPM | WEIGHT: 122 LBS | SYSTOLIC BLOOD PRESSURE: 113 MMHG | DIASTOLIC BLOOD PRESSURE: 79 MMHG | HEIGHT: 64 IN | HEART RATE: 109 BPM

## 2022-06-14 PROCEDURE — 99024 POSTOP FOLLOW-UP VISIT: CPT

## 2022-08-02 NOTE — H&P ADULT - PROBLEM SELECTOR PLAN 4
Routing refill request to provider for review/approval because:  BP Readings from Last 3 Encounters:   07/26/22 (!) 152/74   05/17/22 (!) 164/72   04/28/22 136/67     Future Appointments 8/2/2022 - 1/29/2023        Date Visit Type Length Department Provider     9/27/2022  3:00 PM OFFICE VISIT 30 min CS FAMILY PRAC/IM Braeden Sood MD Hira Nazeer, RN  [facility] Triage Nurse    Order HgbA1c and finger sticks monitoring for now. It is not clear if patient has actual diagnosis of diabetes mellitus. PMD office contacted.

## 2022-09-13 ENCOUNTER — APPOINTMENT (OUTPATIENT)
Dept: THORACIC SURGERY | Facility: CLINIC | Age: 67
End: 2022-09-13

## 2022-09-13 VITALS
HEIGHT: 64 IN | WEIGHT: 127 LBS | OXYGEN SATURATION: 93 % | HEART RATE: 100 BPM | SYSTOLIC BLOOD PRESSURE: 128 MMHG | DIASTOLIC BLOOD PRESSURE: 83 MMHG | BODY MASS INDEX: 21.68 KG/M2

## 2022-09-13 PROCEDURE — 99214 OFFICE O/P EST MOD 30 MIN: CPT

## 2022-09-13 RX ORDER — FLUTICASONE FUROATE, UMECLIDINIUM BROMIDE AND VILANTEROL TRIFENATATE 100; 62.5; 25 UG/1; UG/1; UG/1
100-62.5-25 POWDER RESPIRATORY (INHALATION)
Refills: 0 | Status: ACTIVE | COMMUNITY

## 2022-09-13 NOTE — HISTORY OF PRESENT ILLNESS
[FreeTextEntry1] : Ms. DEMI CHACKO, 67 year old female, former smoker (30PY, quit 2009), w/ hx of COPD,  kidney stones, and RSV bronchiolitis, who presented for routine visit with pulm Dr. Urbina. Further CT Chest 1/2022 revealed new TRISTAN lung nodule, repeat scan revealing increase in size with PET avidity. Referred by Dr. Joseluis Urbina (PULM). \par \par PFT on 1/10/22:\par - FVC 2.81 L (110%), FEV1 1.43L (71%), DLCO 1.69L (40%)\par \par CT Chest on 5/5/22:\par - Mild increase in size in new nodular lesion in TRISTAN described previously (3:51). 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 Pt also had a prolonged Air Leak which improved after bedside bleomycin pleurodesis on 6/4/22. Post CT removal CXR showed sml ptx stable, discharged home with oxygen on 6/8/2022.\par \par CXR ([facility]) 6/13/2022: postsurgical changes with fluid level in upper left lung compatible with presumed focal/loculated hydropneumothorax; elevation of left hemidiaphragm\par \par CT Chest on 9/7/22:\par - Stable post op changes\par \par Here today for follow up. Today, patient denies worsening SOB, chest pain, cough, hemoptysis, fever, chills, night sweats, lightheadedness or dizziness.\par

## 2022-09-13 NOTE — PHYSICAL EXAM
[] : no respiratory distress [Respiration, Rhythm And Depth] : normal respiratory rhythm and effort [Exaggerated Use Of Accessory Muscles For Inspiration] : no accessory muscle use [Auscultation Breath Sounds / Voice Sounds] : lungs were clear to auscultation bilaterally [Heart Rate And Rhythm] : heart rate was normal and rhythm regular [Examination Of The Chest] : the chest was normal in appearance [Chest Visual Inspection Thoracic Asymmetry] : no chest asymmetry [Diminished Respiratory Excursion] : normal chest expansion [2+] : left 2+ [Cervical Lymph Nodes Enlarged Posterior Bilaterally] : posterior cervical [Cervical Lymph Nodes Enlarged Anterior Bilaterally] : anterior cervical [Supraclavicular Lymph Nodes Enlarged Bilaterally] : supraclavicular [Involuntary Movements] : no involuntary movements were seen [Skin Color & Pigmentation] : normal skin color and pigmentation [Skin Turgor] : normal skin turgor [Oriented To Time, Place, And Person] : oriented to person, place, and time

## 2022-09-13 NOTE — ASSESSMENT
[FreeTextEntry1] : Ms. 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 Pt also had a prolonged Air Leak which improved after bedside bleomycin pleurodesis on 6/4/22. Post CT removal CXR showed sml ptx stable, discharged home with oxygen on 6/8/2022.\par \par Here today with follow up CT Chest. \par \par I have independently reviewed the medical records and imaging at the time of this office consultation, and discussed the following interpretations with the patient:\par - CT chest reviewed; Stable findings. Recommendation to return to clinic in 3 months with repeat CT Chest, no contrast. \par \par Recommendations reviewed with patient during this office visit, and all questions answered; Patient instructed on the importance of follow up and verbalizes understanding.\par \par I personally performed the services described in the documentation, reviewed the documentation recorded by the scribe in my presence and it accurately and completely records my words and actions.\par \par I, HERRERA Méndez, am scribing for and the presence of MARIA G BaconIM, the following sections HISTORY OF PRESENT ILLNESS, PAST MEDICAL/FAMILY/SOCIAL HISTORY; REVIEW OF SYSTEMS; VITAL SIGNS; PHYSICAL EXAM; DISPOSITION.\par \par

## 2022-09-13 NOTE — CONSULT LETTER
[Courtesy Letter:] : I had the pleasure of seeing your patient, [unfilled], in my office today. [Please see my note below.] : Please see my note below. [Consult Closing:] : Thank you very much for allowing me to participate in the care of this patient.  If you have any questions, please do not hesitate to contact me. [Sincerely,] : Sincerely, [FreeTextEntry2] : Dr. Joseluis Urbina (PULM/ref)  [FreeTextEntry3] : \par \par \par Nikita Villegas MD, FACS \par Chief, Division of Thoracic Surgery \par Director, Minimally Invasive Thoracic Surgery \par Department of Cardiovascular and Thoracic Surgery \par [facility] \par , Cardiovascular and Thoracic Surgery

## 2022-12-06 ENCOUNTER — APPOINTMENT (OUTPATIENT)
Dept: THORACIC SURGERY | Facility: CLINIC | Age: 67
End: 2022-12-06

## 2022-12-06 VITALS
WEIGHT: 127 LBS | HEART RATE: 90 BPM | DIASTOLIC BLOOD PRESSURE: 77 MMHG | SYSTOLIC BLOOD PRESSURE: 116 MMHG | HEIGHT: 64 IN | OXYGEN SATURATION: 91 % | BODY MASS INDEX: 21.68 KG/M2 | RESPIRATION RATE: 18 BRPM

## 2022-12-06 PROCEDURE — 99214 OFFICE O/P EST MOD 30 MIN: CPT

## 2022-12-07 NOTE — CONSULT LETTER
[Courtesy Letter:] : I had the pleasure of seeing your patient, [unfilled], in my office today. [Please see my note below.] : Please see my note below. [Consult Closing:] : Thank you very much for allowing me to participate in the care of this patient.  If you have any questions, please do not hesitate to contact me. [Sincerely,] : Sincerely, [FreeTextEntry2] : Dr. Joseluis Urbina (PULM/ref)  [FreeTextEntry3] : \par \par \par Nikita Villegas MD, FACS \par Chief, Division of Thoracic Surgery \par Director, Minimally Invasive Thoracic Surgery \par Department of Cardiovascular and Thoracic Surgery \par [facility] \par , Cardiovascular and Thoracic Surgery

## 2022-12-07 NOTE — HISTORY OF PRESENT ILLNESS
[FreeTextEntry1] : Ms. DEMI CHACKO, 67 year old female, former smoker (30PY, quit 2009), w/ hx of COPD,  kidney stones, and RSV bronchiolitis, who presented for routine visit with pulm Dr. Urbina. Further CT Chest 1/2022 revealed new TRISTAN lung nodule, repeat scan revealing increase in size with PET avidity. Referred by Dr. Joseluis Urbina (PULM). \par \par PFT on 1/10/22:\par - FVC 2.81 L (110%), FEV1 1.43L (71%), DLCO 1.69L (40%)\par \par CT Chest on 5/5/22:\par - Mild increase in size in new nodular lesion in TRISTAN described previously (3:51). 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 Pt also had a prolonged Air Leak which improved after bedside bleomycin pleurodesis on 6/4/22. Post CT removal CXR showed sml ptx stable, discharged home with oxygen on 6/8/2022.\par \par CXR ([facility]) 6/13/2022: postsurgical changes with fluid level in upper left lung compatible with presumed focal/loculated hydropneumothorax; elevation of left hemidiaphragm\par \par CT Chest on 9/7/22:\par - Stable post op changes\par \par CXR on 12/2/22 at [facility]:\par - stable post-op changes\par \par Here today for 3 month follow up. Denies SOB, CP, cough.

## 2022-12-07 NOTE — ASSESSMENT
[FreeTextEntry1] : Ms. 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 Pt also had a prolonged Air Leak which improved after bedside bleomycin pleurodesis on 6/4/22. Post CT removal CXR showed sml ptx stable, discharged home with oxygen on 6/8/2022.\par \par I have independently reviewed the medical records and imaging at the time of this office consultation, and discussed the following interpretations with the patient:\par - RTC in 3mo with CT chest w/o contrast.\par \par \par I, Dr. FRANCO [facility], personally performed the evaluation and management (E/M) services for this established patient who presents today with (a) new problem(s)/exacerbation of (an) existing condition(s). That E/M includes conducting the examination, assessing all new/exacerbated conditions, and establishing a new plan of care. Today, my ACP, Ezra Sargent NP was here to observe my evaluation and management services for this new problem/exacerbated condition to be followed going forward.\par \par \par

## 2023-01-10 ENCOUNTER — APPOINTMENT (OUTPATIENT)
Dept: OTOLARYNGOLOGY | Facility: CLINIC | Age: 68
End: 2023-01-10
Payer: MEDICARE

## 2023-01-10 VITALS
SYSTOLIC BLOOD PRESSURE: 123 MMHG | HEIGHT: 64 IN | DIASTOLIC BLOOD PRESSURE: 76 MMHG | WEIGHT: 127 LBS | HEART RATE: 89 BPM | BODY MASS INDEX: 21.68 KG/M2

## 2023-01-10 DIAGNOSIS — H61.22 IMPACTED CERUMEN, LEFT EAR: ICD-10-CM

## 2023-01-10 PROCEDURE — 31575 DIAGNOSTIC LARYNGOSCOPY: CPT | Mod: 59

## 2023-01-10 PROCEDURE — 69210 REMOVE IMPACTED EAR WAX UNI: CPT | Mod: 59

## 2023-01-10 PROCEDURE — 99213 OFFICE O/P EST LOW 20 MIN: CPT | Mod: 25

## 2023-01-10 NOTE — ADDENDUM
[FreeTextEntry1] : Documented by Faizan Pearson acting as scribe for Dr. Mills on 01/10/2023.\par \par All Medical record entries made by the Scribe were at my, Dr. Mills, direction and personally dictated by me on 01/10/2023. I have reviewed the chart and agree that the record accurately reflects my personal performance of the history, physical exam, assessment and plan. I have also personally directed, reviewed, and agreed with the discharge instructions.

## 2023-01-10 NOTE — PROCEDURE
[Hoarseness] : hoarseness not clearly evaluated by indirect laryngoscopy [Complicated Symptoms] : complicated symptoms requiring more thorough examination than provided by mirror [None] : none [Flexible Endoscope] : examined with the flexible endoscope [de-identified] : Procedure: Flexible Fiberoptic Laryngoscopy: Risks, benefits, and alternatives of flexible endoscopy were explained to the patient. The patient gave oral consent to proceed. The flexible scope was inserted into the right nasal cavity and advanced towards the nasopharynx. Visualized mucosa over the turbinates and septum were as describe above. Septal spur on the left side. The nasopharynx was clear. Oropharyngeal walls were symmetric and mobile without lesion, mass, or edema. Hypopharynx was also without lesion or edema. Larynx was mobile without lesions. Supraglottic structures were free of edema, mass, and asymmetry. 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 Base of tongue was within normal limits.

## 2023-01-10 NOTE — CONSULT LETTER
[Dear  ___] : Dear  [unfilled], [Courtesy Letter:] : I had the pleasure of seeing your patient, [unfilled], in my office today. [Please see my note below.] : Please see my note below. [Consult Closing:] : Thank you very much for allowing me to participate in the care of this patient.  If you have any questions, please do not hesitate to contact me. [Sincerely,] : Sincerely, [FreeTextEntry3] : Torin Mills MD FACS

## 2023-01-10 NOTE — PHYSICAL EXAM
[[place] Test (Tuning Fork On Forehead)] : no lateralization of tone [] : septum deviated to the right [Normal] : mucosa is normal [Midline] : trachea located in midline position [Removed] : palatine tonsils previously removed [FreeTextEntry8] : Cerumen removed via curettage.  [FreeTextEntry9] : Cerumen impaction removed via curettage.  [de-identified] : mildly inflamed  [FreeTextEntry2] : Sinuses nontender to percussion. Sensations intact.  [de-identified] : PERRL

## 2023-01-10 NOTE — ASSESSMENT
[FreeTextEntry1] : Reviewed and reconciled medications, allergies, PMHx, PSHx, SocHx, FMHx. \par \par lung surgery on a left upper lobe nodule for lung CA 5/25/2022\par hoarseness\par throat clearing\par left cerumen impaction\par \par FFL findings: Septal spur on the left side. 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 Flexible Fiberoptic Laryngoscopy. Videostrobe. FU after test.

## 2023-02-17 ENCOUNTER — APPOINTMENT (OUTPATIENT)
Dept: OTOLARYNGOLOGY | Facility: CLINIC | Age: 68
End: 2023-02-17
Payer: MEDICARE

## 2023-02-17 PROCEDURE — 31579 LARYNGOSCOPY TELESCOPIC: CPT

## 2023-02-23 ENCOUNTER — NON-APPOINTMENT (OUTPATIENT)
Age: 68
End: 2023-02-23

## 2023-03-07 ENCOUNTER — APPOINTMENT (OUTPATIENT)
Dept: THORACIC SURGERY | Facility: CLINIC | Age: 68
End: 2023-03-07
Payer: MEDICARE

## 2023-03-07 VITALS
SYSTOLIC BLOOD PRESSURE: 132 MMHG | RESPIRATION RATE: 18 BRPM | DIASTOLIC BLOOD PRESSURE: 83 MMHG | BODY MASS INDEX: 22.71 KG/M2 | HEIGHT: 64 IN | WEIGHT: 133 LBS | HEART RATE: 90 BPM

## 2023-03-07 VITALS — OXYGEN SATURATION: 93 %

## 2023-03-07 PROCEDURE — 99214 OFFICE O/P EST MOD 30 MIN: CPT

## 2023-03-07 NOTE — PHYSICAL EXAM
[] : no respiratory distress [Respiration, Rhythm And Depth] : normal respiratory rhythm and effort [Exaggerated Use Of Accessory Muscles For Inspiration] : no accessory muscle use [Auscultation Breath Sounds / Voice Sounds] : lungs were clear to auscultation bilaterally [Heart Rate And Rhythm] : heart rate was normal and rhythm regular [Examination Of The Chest] : the chest was normal in appearance [Chest Visual Inspection Thoracic Asymmetry] : no chest asymmetry [Diminished Respiratory Excursion] : normal chest expansion [2+] : left 2+ [Breast Appearance] : normal in appearance [Breast Palpation Mass] : no palpable masses [Bowel Sounds] : normal bowel sounds [Abdomen Soft] : soft [Abdomen Tenderness] : non-tender [Involuntary Movements] : no involuntary movements were seen [Skin Color & Pigmentation] : normal skin color and pigmentation [Oriented To Time, Place, And Person] : oriented to person, place, and time

## 2023-03-07 NOTE — CONSULT LETTER
[FreeTextEntry2] : Dr. Joseluis Urbina (PULM/ref)  [FreeTextEntry3] : \par \par \par Nikita Villegas MD, FACS \par Chief, Division of Thoracic Surgery \par Director, Minimally Invasive Thoracic Surgery \par Department of Cardiovascular and Thoracic Surgery \par [facility] \par , Cardiovascular and Thoracic Surgery

## 2023-03-07 NOTE — HISTORY OF PRESENT ILLNESS
[FreeTextEntry1] : Ms. DEMI CHACKO, 67 year old female, former smoker (30PY, quit 2009), w/ hx of COPD,  kidney stones, and RSV bronchiolitis, who presented for routine visit with pulm Dr. Urbina. Further CT Chest 1/2022 revealed new TRISTAN lung nodule, repeat scan revealing increase in size with PET avidity. Referred by Dr. Joseluis Urbina (PULM). \par \par PFT on 1/10/22:\par - FVC 2.81 L (110%), FEV1 1.43L (71%), DLCO 1.69L (40%)\par \par CT Chest on 5/5/22:\par - Mild increase in size in new nodular lesion in TRISTAN described previously (3:51). 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 Pt also had a prolonged Air Leak which improved after bedside bleomycin pleurodesis on 6/4/22. Post CT removal CXR showed sml ptx stable, discharged home with oxygen on 6/8/2022.\par \par CXR ([facility]) 6/13/2022: postsurgical changes with fluid level in upper left lung compatible with presumed focal/loculated hydropneumothorax; elevation of left hemidiaphragm\par \par CT Chest on 9/7/22:\par - Stable post op changes\par \par CXR on 12/2/22 at [facility]:\par - stable post-op changes\par \par CT Chest on 2/27/23 ([facility])\par - Post op changes\par - Moderate emphysema and bronchial thickening\par \par Here today for 3 month follow up. Today, patient denies worsening SOB, chest pain, cough, hemoptysis, fever, chills, night sweats, lightheadedness or dizziness.\par

## 2023-03-07 NOTE — ASSESSMENT
[FreeTextEntry1] : Ms. 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 Pt also had a prolonged Air Leak which improved after bedside bleomycin pleurodesis on 6/4/22. Post CT removal CXR showed sml ptx stable, discharged home with oxygen on 6/8/2022.\par \par Here today with follow up scan\par \par I have independently reviewed the medical records and imaging at the time of this office consultation, and discussed the following interpretations with the patient:\par - CT Chest reviewed; Stable. Return to clinic in 3 months with CXR to re-evaluate stability. Patient is agreeable. \par - Follow up with pulmology as scheduled. \par \par Recommendations reviewed with patient during this office visit, and all questions answered; Patient instructed on the importance of follow up and verbalizes understanding.\par \par I, MARIA G BaconIM, personally performed the evaluation and management (E/M) services for this established patient. That E/M includes conducting the examination, assessing all new/exacerbated conditions, and establishing a new plan of care. Today, My ACP, Aliyah Ann, was here to observe my evaluation and management services for this patient to be followed going forward.\par \par \par \par \par

## 2023-03-22 ENCOUNTER — APPOINTMENT (OUTPATIENT)
Dept: OTOLARYNGOLOGY | Facility: CLINIC | Age: 68
End: 2023-03-22
Payer: MEDICARE

## 2023-03-22 VITALS
WEIGHT: 129 LBS | HEIGHT: 64 IN | DIASTOLIC BLOOD PRESSURE: 79 MMHG | SYSTOLIC BLOOD PRESSURE: 122 MMHG | HEART RATE: 91 BPM | BODY MASS INDEX: 22.02 KG/M2

## 2023-03-22 DIAGNOSIS — J31.0 CHRONIC RHINITIS: ICD-10-CM

## 2023-03-22 DIAGNOSIS — R49.0 DYSPHONIA: ICD-10-CM

## 2023-03-22 DIAGNOSIS — J34.2 DEVIATED NASAL SEPTUM: ICD-10-CM

## 2023-03-22 DIAGNOSIS — R68.89 OTHER GENERAL SYMPTOMS AND SIGNS: ICD-10-CM

## 2023-03-22 DIAGNOSIS — J38.00 PARALYSIS OF VOCAL CORDS AND LARYNX, UNSPECIFIED: ICD-10-CM

## 2023-03-22 PROCEDURE — 99214 OFFICE O/P EST MOD 30 MIN: CPT

## 2023-03-22 RX ORDER — AZELASTINE HYDROCHLORIDE 137 UG/1
137 SPRAY, METERED NASAL TWICE DAILY
Qty: 1 | Refills: 5 | Status: ACTIVE | COMMUNITY
Start: 2023-03-22 | End: 1900-01-01

## 2023-03-22 NOTE — ASSESSMENT
[FreeTextEntry1] : Reviewed and reconciled medications, allergies, PMHx, PSHx, SocHx, FMHx.\par \par Pt presents with h/o hoarseness and throat clearing since the summer 2022 or just about after her lung surgery, vocal cord paresis. Pt presents today for Videostrobe. pt notes she doesn't think the hoarseness comes as often, comes and goes. Pt notes she has a PND right now. Pt denies choking. Pt notes she found out she has skin cancer and is getting surgery to remove it next to left nostril. \par \par Physical Exam -\par deviated septum with spur posterior left, inflamed allergic turbs\par \par Videostrobe 2/17/23\par larynx somewhat shifted\par reduced motion left vocal cord, compensation of right vocal cord\par pooling of secretions in the hypopharynx\par \par Plan: \par Discussed r/b/a of speech therapy, vocal cord injection. Astelin - 2 sprays bilaterally BID, spray laterally for drip. \par Drink more water. Recommended speech therapy - if there is no improvement, consider vocal cord injection. FU 3 months. \par More the 50% of time was spent counseling the patient.

## 2023-03-22 NOTE — HISTORY OF PRESENT ILLNESS
[de-identified] : Pt presents with h/o hoarseness and throat clearing since the summer 2022 or just about after her lung surgery, vocal cord paresis. Pt presents today for Videostrobe. pt notes she doesn't think the hoarseness comes as often, comes and goes. Pt notes she has a PND right now. Pt denies choking when eating or drinking. Pt notes she doesn't drink enough, drinks a cup of coffee in the morning and 1 more drink during the day. Pt notes she found out she has skin cancer and is getting surgery to remove it next to left nostril.

## 2023-03-22 NOTE — PHYSICAL EXAM
[Midline] : trachea located in midline position [Normal] : orientation to person, place, and time: normal [de-identified] : deviated septum with spur posterior left [de-identified] : inflamed allergic turbs

## 2023-03-22 NOTE — ADDENDUM
[FreeTextEntry1] : Documented by Calista Young acting as scribe for Dr. Mills on 03/22/2023.\par \par All Medical record entries made by the scribe were at my, Dr. Mills,direction and personally dictated by me on 03/22/2023. I have reviewed the chart and agree that the record accurately reflects my personal performance of the history, physical exam, assessment and plan. I have also personally directed, reviewed, and agreed with the discharge instructions.

## 2023-04-05 ENCOUNTER — APPOINTMENT (OUTPATIENT)
Dept: OTOLARYNGOLOGY | Facility: CLINIC | Age: 68
End: 2023-04-05
Payer: MEDICARE

## 2023-04-05 PROCEDURE — 92507 TX SP LANG VOICE COMM INDIV: CPT | Mod: GN

## 2023-04-11 ENCOUNTER — NON-APPOINTMENT (OUTPATIENT)
Age: 68
End: 2023-04-11

## 2023-04-12 ENCOUNTER — APPOINTMENT (OUTPATIENT)
Dept: OTOLARYNGOLOGY | Facility: CLINIC | Age: 68
End: 2023-04-12

## 2023-04-19 ENCOUNTER — APPOINTMENT (OUTPATIENT)
Dept: OTOLARYNGOLOGY | Facility: CLINIC | Age: 68
End: 2023-04-19
Payer: MEDICARE

## 2023-04-19 PROCEDURE — 92507 TX SP LANG VOICE COMM INDIV: CPT | Mod: GN

## 2023-06-13 ENCOUNTER — APPOINTMENT (OUTPATIENT)
Dept: THORACIC SURGERY | Facility: CLINIC | Age: 68
End: 2023-06-13
Payer: MEDICARE

## 2023-06-13 ENCOUNTER — NON-APPOINTMENT (OUTPATIENT)
Age: 68
End: 2023-06-13

## 2023-06-19 NOTE — DISCHARGE NOTE ADULT - NSTOBACCOUSAGEY/N_GEN_A_CS
Detail Level: Zone Plan: Natrium Azelaic acid topical cream 10%, Joesoefâs soap Render In Strict Bullet Format?: No No

## 2023-06-20 ENCOUNTER — APPOINTMENT (OUTPATIENT)
Dept: THORACIC SURGERY | Facility: CLINIC | Age: 68
End: 2023-06-20
Payer: MEDICARE

## 2023-06-20 VITALS
BODY MASS INDEX: 23.05 KG/M2 | WEIGHT: 135 LBS | OXYGEN SATURATION: 90 % | RESPIRATION RATE: 18 BRPM | DIASTOLIC BLOOD PRESSURE: 70 MMHG | SYSTOLIC BLOOD PRESSURE: 108 MMHG | HEART RATE: 85 BPM | HEIGHT: 64 IN

## 2023-06-20 PROCEDURE — 99214 OFFICE O/P EST MOD 30 MIN: CPT

## 2023-06-20 NOTE — CONSULT LETTER
[FreeTextEntry2] : Dr. Joseluis Urbina (PULM/ref)  [FreeTextEntry3] : \par \par \par Nikita Villegas MD, FACS \par Chief, Division of Thoracic Surgery \par Director, Minimally Invasive Thoracic Surgery \par Department of Cardiovascular and Thoracic Surgery \par [facility] \par , Cardiovascular and Thoracic Surgery

## 2023-06-20 NOTE — ASSESSMENT
[FreeTextEntry1] : Ms. 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 Pt also had a prolonged Air Leak which improved after bedside bleomycin pleurodesis on 6/4/22. Post CT removal CXR showed sml ptx stable, discharged home with oxygen on 6/8/2022.\par \par I have reviewed the patient's medical records and diagnostic images at time of this office consultation and have made the following recommendation:\par 1. CXR reviewed, I recommend she returns to clinic in 3 months with repeat CT Chest without contrast.\par 2. Continue follow up with PCP. \par \par Recommendations reviewed with patient during this office visit, and all questions answered; Patient instructed on the importance of follow up and verbalizes understanding. \par \par \par I, Dr. FRANCO [facility], personally performed the evaluation and management (E/M) services for this established patient. That E/M includes conducting the examination, assessing all new/exacerbated conditions, and establishing a new plan of care. Today, my ACP, Zohaib Centeno NP, was here to observe my evaluation and management services for this new problem/exacerbated condition to be followed going forward.\par \par  \par \par \par \par \par

## 2023-06-20 NOTE — HISTORY OF PRESENT ILLNESS
[FreeTextEntry1] : Ms. DEMI CHACKO, 68 year old female, former smoker (30PY, quit 2009), w/ hx of COPD,  kidney stones, and RSV bronchiolitis, who presented for routine visit with pulm Dr. Urbina. Further CT Chest 1/2022 revealed new TRISTAN lung nodule, repeat scan revealing increase in size with PET avidity. Referred by Dr. Joseluis Urbina (PULM). \par \par PFT on 1/10/22:\par - FVC 2.81 L (110%), FEV1 1.43L (71%), DLCO 1.69L (40%)\par \par CT Chest on 5/5/22:\par - Mild increase in size in new nodular lesion in TRISTAN described previously (3:51). 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 Pt also had a prolonged Air Leak which improved after bedside bleomycin pleurodesis on 6/4/22. Post CT removal CXR showed sml ptx stable, discharged home with oxygen on 6/8/2022.\par \par CXR ([facility]) 6/13/2022: postsurgical changes with fluid level in upper left lung compatible with presumed focal/loculated hydropneumothorax; elevation of left hemidiaphragm\par \par CT Chest on 9/7/22:\par - Stable post op changes\par \par CXR on 12/2/22 at [facility]:\par - stable post-op changes\par \par CT Chest on 2/27/23 ([facility])\par - Post op changes\par - Moderate emphysema and bronchial thickening\par \par CXR on 6/6/23: \par - Status post left upper lobe resection with elevation of the left hemidiaphragm and postoperative changes in the left lung field, unchanged since the previous study.\par - Emphysematous changes in both lung fields.\par - No acute pulmonary disease.\par \par Here today for 3 month follow up. Overall, she reports to be feeling well. Denies any chest pain, shortness of breath, cough, or hemoptysis. \par

## 2023-06-21 ENCOUNTER — APPOINTMENT (OUTPATIENT)
Dept: OTOLARYNGOLOGY | Facility: CLINIC | Age: 68
End: 2023-06-21

## 2023-09-26 ENCOUNTER — APPOINTMENT (OUTPATIENT)
Dept: THORACIC SURGERY | Facility: CLINIC | Age: 68
End: 2023-09-26
Payer: MEDICARE

## 2023-09-26 ENCOUNTER — NON-APPOINTMENT (OUTPATIENT)
Age: 68
End: 2023-09-26

## 2023-09-26 VITALS
WEIGHT: 134 LBS | BODY MASS INDEX: 22.88 KG/M2 | HEART RATE: 83 BPM | DIASTOLIC BLOOD PRESSURE: 83 MMHG | HEIGHT: 64 IN | SYSTOLIC BLOOD PRESSURE: 135 MMHG | OXYGEN SATURATION: 90 % | RESPIRATION RATE: 18 BRPM

## 2023-09-26 PROCEDURE — 99213 OFFICE O/P EST LOW 20 MIN: CPT

## 2023-10-17 ENCOUNTER — APPOINTMENT (OUTPATIENT)
Dept: THORACIC SURGERY | Facility: CLINIC | Age: 68
End: 2023-10-17
Payer: MEDICARE

## 2023-10-17 VITALS
SYSTOLIC BLOOD PRESSURE: 144 MMHG | OXYGEN SATURATION: 90 % | HEART RATE: 98 BPM | WEIGHT: 133 LBS | DIASTOLIC BLOOD PRESSURE: 88 MMHG | BODY MASS INDEX: 22.71 KG/M2 | HEIGHT: 64 IN | RESPIRATION RATE: 18 BRPM

## 2023-10-17 DIAGNOSIS — K76.9 LIVER DISEASE, UNSPECIFIED: ICD-10-CM

## 2023-10-17 PROCEDURE — 99215 OFFICE O/P EST HI 40 MIN: CPT

## 2023-10-18 ENCOUNTER — APPOINTMENT (OUTPATIENT)
Dept: INTERVENTIONAL RADIOLOGY/VASCULAR | Facility: CLINIC | Age: 68
End: 2023-10-18

## 2023-10-18 DIAGNOSIS — R91.1 SOLITARY PULMONARY NODULE: ICD-10-CM

## 2023-10-19 ENCOUNTER — OUTPATIENT (OUTPATIENT)
Dept: OUTPATIENT SERVICES | Facility: HOSPITAL | Age: 68
LOS: 1 days | End: 2023-10-19
Payer: MEDICARE

## 2023-10-19 VITALS
OXYGEN SATURATION: 96 % | DIASTOLIC BLOOD PRESSURE: 82 MMHG | RESPIRATION RATE: 16 BRPM | SYSTOLIC BLOOD PRESSURE: 136 MMHG | WEIGHT: 130.07 LBS | HEART RATE: 85 BPM | TEMPERATURE: 99 F | HEIGHT: 62.5 IN

## 2023-10-19 DIAGNOSIS — R91.1 SOLITARY PULMONARY NODULE: ICD-10-CM

## 2023-10-19 DIAGNOSIS — Z98.890 OTHER SPECIFIED POSTPROCEDURAL STATES: Chronic | ICD-10-CM

## 2023-10-19 DIAGNOSIS — Z98.89 OTHER SPECIFIED POSTPROCEDURAL STATES: Chronic | ICD-10-CM

## 2023-10-19 DIAGNOSIS — C34.90 MALIGNANT NEOPLASM OF UNSPECIFIED PART OF UNSPECIFIED BRONCHUS OR LUNG: ICD-10-CM

## 2023-10-19 DIAGNOSIS — Z90.89 ACQUIRED ABSENCE OF OTHER ORGANS: Chronic | ICD-10-CM

## 2023-10-19 DIAGNOSIS — Z41.9 ENCOUNTER FOR PROCEDURE FOR PURPOSES OTHER THAN REMEDYING HEALTH STATE, UNSPECIFIED: Chronic | ICD-10-CM

## 2023-10-19 DIAGNOSIS — Z87.09 PERSONAL HISTORY OF OTHER DISEASES OF THE RESPIRATORY SYSTEM: ICD-10-CM

## 2023-10-19 LAB
ANION GAP SERPL CALC-SCNC: 11 MMOL/L — SIGNIFICANT CHANGE UP (ref 7–14)
ANION GAP SERPL CALC-SCNC: 11 MMOL/L — SIGNIFICANT CHANGE UP (ref 7–14)
BLD GP AB SCN SERPL QL: NEGATIVE — SIGNIFICANT CHANGE UP
BLD GP AB SCN SERPL QL: NEGATIVE — SIGNIFICANT CHANGE UP
BUN SERPL-MCNC: 20 MG/DL — SIGNIFICANT CHANGE UP (ref 7–23)
BUN SERPL-MCNC: 20 MG/DL — SIGNIFICANT CHANGE UP (ref 7–23)
CALCIUM SERPL-MCNC: 10.1 MG/DL — SIGNIFICANT CHANGE UP (ref 8.4–10.5)
CALCIUM SERPL-MCNC: 10.1 MG/DL — SIGNIFICANT CHANGE UP (ref 8.4–10.5)
CHLORIDE SERPL-SCNC: 103 MMOL/L — SIGNIFICANT CHANGE UP (ref 98–107)
CHLORIDE SERPL-SCNC: 103 MMOL/L — SIGNIFICANT CHANGE UP (ref 98–107)
CO2 SERPL-SCNC: 28 MMOL/L — SIGNIFICANT CHANGE UP (ref 22–31)
CO2 SERPL-SCNC: 28 MMOL/L — SIGNIFICANT CHANGE UP (ref 22–31)
CREAT SERPL-MCNC: 0.53 MG/DL — SIGNIFICANT CHANGE UP (ref 0.5–1.3)
CREAT SERPL-MCNC: 0.53 MG/DL — SIGNIFICANT CHANGE UP (ref 0.5–1.3)
EGFR: 101 ML/MIN/1.73M2 — SIGNIFICANT CHANGE UP
EGFR: 101 ML/MIN/1.73M2 — SIGNIFICANT CHANGE UP
GLUCOSE SERPL-MCNC: 99 MG/DL — SIGNIFICANT CHANGE UP (ref 70–99)
GLUCOSE SERPL-MCNC: 99 MG/DL — SIGNIFICANT CHANGE UP (ref 70–99)
HCT VFR BLD CALC: 46.9 % — HIGH (ref 34.5–45)
HCT VFR BLD CALC: 46.9 % — HIGH (ref 34.5–45)
HGB BLD-MCNC: 15.1 G/DL — SIGNIFICANT CHANGE UP (ref 11.5–15.5)
HGB BLD-MCNC: 15.1 G/DL — SIGNIFICANT CHANGE UP (ref 11.5–15.5)
MCHC RBC-ENTMCNC: 29.2 PG — SIGNIFICANT CHANGE UP (ref 27–34)
MCHC RBC-ENTMCNC: 29.2 PG — SIGNIFICANT CHANGE UP (ref 27–34)
MCHC RBC-ENTMCNC: 32.2 GM/DL — SIGNIFICANT CHANGE UP (ref 32–36)
MCHC RBC-ENTMCNC: 32.2 GM/DL — SIGNIFICANT CHANGE UP (ref 32–36)
MCV RBC AUTO: 90.7 FL — SIGNIFICANT CHANGE UP (ref 80–100)
MCV RBC AUTO: 90.7 FL — SIGNIFICANT CHANGE UP (ref 80–100)
NRBC # BLD: 0 /100 WBCS — SIGNIFICANT CHANGE UP (ref 0–0)
NRBC # BLD: 0 /100 WBCS — SIGNIFICANT CHANGE UP (ref 0–0)
NRBC # FLD: 0 K/UL — SIGNIFICANT CHANGE UP (ref 0–0)
NRBC # FLD: 0 K/UL — SIGNIFICANT CHANGE UP (ref 0–0)
PLATELET # BLD AUTO: 282 K/UL — SIGNIFICANT CHANGE UP (ref 150–400)
PLATELET # BLD AUTO: 282 K/UL — SIGNIFICANT CHANGE UP (ref 150–400)
POTASSIUM SERPL-MCNC: 4 MMOL/L — SIGNIFICANT CHANGE UP (ref 3.5–5.3)
POTASSIUM SERPL-MCNC: 4 MMOL/L — SIGNIFICANT CHANGE UP (ref 3.5–5.3)
POTASSIUM SERPL-SCNC: 4 MMOL/L — SIGNIFICANT CHANGE UP (ref 3.5–5.3)
POTASSIUM SERPL-SCNC: 4 MMOL/L — SIGNIFICANT CHANGE UP (ref 3.5–5.3)
RBC # BLD: 5.17 M/UL — SIGNIFICANT CHANGE UP (ref 3.8–5.2)
RBC # BLD: 5.17 M/UL — SIGNIFICANT CHANGE UP (ref 3.8–5.2)
RBC # FLD: 12.1 % — SIGNIFICANT CHANGE UP (ref 10.3–14.5)
RBC # FLD: 12.1 % — SIGNIFICANT CHANGE UP (ref 10.3–14.5)
RH IG SCN BLD-IMP: NEGATIVE — SIGNIFICANT CHANGE UP
RH IG SCN BLD-IMP: NEGATIVE — SIGNIFICANT CHANGE UP
SODIUM SERPL-SCNC: 142 MMOL/L — SIGNIFICANT CHANGE UP (ref 135–145)
SODIUM SERPL-SCNC: 142 MMOL/L — SIGNIFICANT CHANGE UP (ref 135–145)
WBC # BLD: 7.43 K/UL — SIGNIFICANT CHANGE UP (ref 3.8–10.5)
WBC # BLD: 7.43 K/UL — SIGNIFICANT CHANGE UP (ref 3.8–10.5)
WBC # FLD AUTO: 7.43 K/UL — SIGNIFICANT CHANGE UP (ref 3.8–10.5)
WBC # FLD AUTO: 7.43 K/UL — SIGNIFICANT CHANGE UP (ref 3.8–10.5)

## 2023-10-19 PROCEDURE — 93010 ELECTROCARDIOGRAM REPORT: CPT

## 2023-10-19 RX ORDER — TIOTROPIUM BROMIDE 18 UG/1
2 CAPSULE ORAL; RESPIRATORY (INHALATION)
Qty: 0 | Refills: 0 | DISCHARGE

## 2023-10-19 RX ORDER — SODIUM CHLORIDE 9 MG/ML
1000 INJECTION, SOLUTION INTRAVENOUS
Refills: 0 | Status: DISCONTINUED | OUTPATIENT
Start: 2023-10-25 | End: 2023-10-27

## 2023-10-19 RX ORDER — FLUTICASONE PROPIONATE AND SALMETEROL 50; 250 UG/1; UG/1
1 POWDER ORAL; RESPIRATORY (INHALATION)
Qty: 0 | Refills: 0 | DISCHARGE

## 2023-10-19 NOTE — H&P PST ADULT - HISTORY OF PRESENT ILLNESS
67 yo female with h/o lung cancer s/p left lung resection in 5/2023.  Pt now has right lung nodule that is enlarging. Pt is scheduled for flexible bronchoscopy, right video assisted thoracoscopic surgery, right upper lobectomy, possible thoracotomy with epidural.  69 yo female with h/o COPD and lung cancer s/p left lung resection in 5/2022 - was discharged on home O2 for about 1 month.  Pt now has right lung nodule that is enlarging. Pt is scheduled for flexible bronchoscopy, right video assisted thoracoscopic surgery, right upper lobectomy, possible thoracotomy with epidural.

## 2023-10-19 NOTE — H&P PST ADULT - PROBLEM SELECTOR PLAN 1
Pt is scheduled for flexible bronchoscopy, right video assisted thoracoscopic surgery, right upper lobectomy, possible thoracotomy with epidural on 10/25/23.  Verbal and written pre op instructions reviewed with patient and pt able to verbalize understanding.   Verbal and written instructions given with chlorhexidine wash, pt able to verbalize understanding via teach back method.  Pt to obtain cardiac eval as instructed by surgeon - appointment is tomorrow 10/20/23.

## 2023-10-19 NOTE — H&P PST ADULT - NSICDXPASTMEDICALHX_GEN_ALL_CORE_FT
PAST MEDICAL HISTORY:  Basal cell carcinoma     Emphysema lung     Former smoker     History of RSV infection     Lung cancer     Pulmonary nodule     Renal calculi     Shingles

## 2023-10-19 NOTE — H&P PST ADULT - NSICDXPASTSURGICALHX_GEN_ALL_CORE_FT
PAST SURGICAL HISTORY:  H/O basal cell carcinoma excision     History of hernia repair     History of laparoscopic cholecystectomy 1995    History of lung surgery     History of tonsillectomy     S/P cystoscopy with right renal stent insertion 5/8/18

## 2023-10-19 NOTE — H&P PST ADULT - ENMT COMMENTS
Denies loose teeth or dentures.  Mallampati Denies loose teeth or dentures.  Mallampati I - pt had tooth removed by dentist 10/13/23 sutures in place, no s/s infection - follow up appointment with dentist tomorrow for suture removal

## 2023-10-19 NOTE — H&P PST ADULT - RESPIRATORY AND THORAX COMMENTS
Hx COPD - pt c/o of mild SOB with stair climbing- last rescue inhaler months ago.  h/o lung cancer s/p resection, now with new right lung nodule

## 2023-10-23 ENCOUNTER — APPOINTMENT (OUTPATIENT)
Dept: MRI IMAGING | Facility: CLINIC | Age: 68
End: 2023-10-23
Payer: MEDICARE

## 2023-10-23 PROCEDURE — 74183 MRI ABD W/O CNTR FLWD CNTR: CPT

## 2023-10-23 PROCEDURE — A9585: CPT

## 2023-10-24 ENCOUNTER — APPOINTMENT (OUTPATIENT)
Dept: NUCLEAR MEDICINE | Facility: HOSPITAL | Age: 68
End: 2023-10-24
Payer: MEDICARE

## 2023-10-24 ENCOUNTER — APPOINTMENT (OUTPATIENT)
Dept: THORACIC SURGERY | Facility: CLINIC | Age: 68
End: 2023-10-24
Payer: MEDICARE

## 2023-10-24 ENCOUNTER — OUTPATIENT (OUTPATIENT)
Dept: OUTPATIENT SERVICES | Facility: HOSPITAL | Age: 68
LOS: 1 days | End: 2023-10-24

## 2023-10-24 DIAGNOSIS — R91.1 SOLITARY PULMONARY NODULE: ICD-10-CM

## 2023-10-24 DIAGNOSIS — Z90.89 ACQUIRED ABSENCE OF OTHER ORGANS: Chronic | ICD-10-CM

## 2023-10-24 DIAGNOSIS — Z98.890 OTHER SPECIFIED POSTPROCEDURAL STATES: Chronic | ICD-10-CM

## 2023-10-24 DIAGNOSIS — Z98.89 OTHER SPECIFIED POSTPROCEDURAL STATES: Chronic | ICD-10-CM

## 2023-10-24 PROBLEM — C34.90 MALIGNANT NEOPLASM OF UNSPECIFIED PART OF UNSPECIFIED BRONCHUS OR LUNG: Chronic | Status: ACTIVE | Noted: 2023-10-19

## 2023-10-24 PROBLEM — C44.91 BASAL CELL CARCINOMA OF SKIN, UNSPECIFIED: Chronic | Status: ACTIVE | Noted: 2023-10-19

## 2023-10-24 PROBLEM — Z86.19 PERSONAL HISTORY OF OTHER INFECTIOUS AND PARASITIC DISEASES: Chronic | Status: ACTIVE | Noted: 2023-10-19

## 2023-10-24 PROCEDURE — 78597 LUNG PERFUSION DIFFERENTIAL: CPT | Mod: 26

## 2023-10-24 PROCEDURE — 99443: CPT

## 2023-10-24 NOTE — ASU PATIENT PROFILE, ADULT - TEACHING/LEARNING EDUCATIONAL LEVEL
needed in the pre-op area to prepare you for surgery. Please do not be discouraged if you are not greeted in the order you arrive as there are many variables that are involved in patient preparation. Your patience is greatly appreciated as you wait for your nurse. Please bring in items such as: books, magazines, newspapers, electronics, or any other items  to occupy your time in the waiting area. [x]  Delays may occur with surgery and staff will make a sincere effort to keep you informed of delays. If any delays occur with your procedure, we apologize ahead of time for your inconvenience as we recognize the value of your time.
[facility]

## 2023-10-25 ENCOUNTER — INPATIENT (INPATIENT)
Facility: HOSPITAL | Age: 68
LOS: 2 days | Discharge: HOME HEALTH | End: 2023-10-28
Attending: THORACIC SURGERY (CARDIOTHORACIC VASCULAR SURGERY) | Admitting: THORACIC SURGERY (CARDIOTHORACIC VASCULAR SURGERY)
Payer: MEDICARE

## 2023-10-25 ENCOUNTER — APPOINTMENT (OUTPATIENT)
Dept: THORACIC SURGERY | Facility: HOSPITAL | Age: 68
End: 2023-10-25

## 2023-10-25 ENCOUNTER — RESULT REVIEW (OUTPATIENT)
Age: 68
End: 2023-10-25

## 2023-10-25 ENCOUNTER — TRANSCRIPTION ENCOUNTER (OUTPATIENT)
Age: 68
End: 2023-10-25

## 2023-10-25 VITALS
WEIGHT: 130.07 LBS | SYSTOLIC BLOOD PRESSURE: 122 MMHG | TEMPERATURE: 98 F | HEIGHT: 62.5 IN | HEART RATE: 91 BPM | OXYGEN SATURATION: 95 % | DIASTOLIC BLOOD PRESSURE: 88 MMHG | RESPIRATION RATE: 16 BRPM

## 2023-10-25 DIAGNOSIS — Z90.89 ACQUIRED ABSENCE OF OTHER ORGANS: Chronic | ICD-10-CM

## 2023-10-25 DIAGNOSIS — C34.90 MALIGNANT NEOPLASM OF UNSPECIFIED PART OF UNSPECIFIED BRONCHUS OR LUNG: ICD-10-CM

## 2023-10-25 DIAGNOSIS — Z98.890 OTHER SPECIFIED POSTPROCEDURAL STATES: Chronic | ICD-10-CM

## 2023-10-25 DIAGNOSIS — Z98.89 OTHER SPECIFIED POSTPROCEDURAL STATES: Chronic | ICD-10-CM

## 2023-10-25 LAB
ALBUMIN SERPL ELPH-MCNC: 3.9 G/DL — SIGNIFICANT CHANGE UP (ref 3.3–5)
ALBUMIN SERPL ELPH-MCNC: 3.9 G/DL — SIGNIFICANT CHANGE UP (ref 3.3–5)
ALP SERPL-CCNC: 69 U/L — SIGNIFICANT CHANGE UP (ref 40–120)
ALP SERPL-CCNC: 69 U/L — SIGNIFICANT CHANGE UP (ref 40–120)
ALT FLD-CCNC: 16 U/L — SIGNIFICANT CHANGE UP (ref 4–33)
ALT FLD-CCNC: 16 U/L — SIGNIFICANT CHANGE UP (ref 4–33)
ANION GAP SERPL CALC-SCNC: 10 MMOL/L — SIGNIFICANT CHANGE UP (ref 7–14)
ANION GAP SERPL CALC-SCNC: 10 MMOL/L — SIGNIFICANT CHANGE UP (ref 7–14)
AST SERPL-CCNC: 14 U/L — SIGNIFICANT CHANGE UP (ref 4–32)
AST SERPL-CCNC: 14 U/L — SIGNIFICANT CHANGE UP (ref 4–32)
BASOPHILS # BLD AUTO: 0.07 K/UL — SIGNIFICANT CHANGE UP (ref 0–0.2)
BASOPHILS # BLD AUTO: 0.07 K/UL — SIGNIFICANT CHANGE UP (ref 0–0.2)
BASOPHILS NFR BLD AUTO: 0.4 % — SIGNIFICANT CHANGE UP (ref 0–2)
BASOPHILS NFR BLD AUTO: 0.4 % — SIGNIFICANT CHANGE UP (ref 0–2)
BILIRUB SERPL-MCNC: 0.3 MG/DL — SIGNIFICANT CHANGE UP (ref 0.2–1.2)
BILIRUB SERPL-MCNC: 0.3 MG/DL — SIGNIFICANT CHANGE UP (ref 0.2–1.2)
BLOOD GAS ARTERIAL - LYTES,HGB,ICA,LACT RESULT: SIGNIFICANT CHANGE UP
BLOOD GAS ARTERIAL - LYTES,HGB,ICA,LACT RESULT: SIGNIFICANT CHANGE UP
BUN SERPL-MCNC: 15 MG/DL — SIGNIFICANT CHANGE UP (ref 7–23)
BUN SERPL-MCNC: 15 MG/DL — SIGNIFICANT CHANGE UP (ref 7–23)
CALCIUM SERPL-MCNC: 9 MG/DL — SIGNIFICANT CHANGE UP (ref 8.4–10.5)
CALCIUM SERPL-MCNC: 9 MG/DL — SIGNIFICANT CHANGE UP (ref 8.4–10.5)
CHLORIDE SERPL-SCNC: 107 MMOL/L — SIGNIFICANT CHANGE UP (ref 98–107)
CHLORIDE SERPL-SCNC: 107 MMOL/L — SIGNIFICANT CHANGE UP (ref 98–107)
CO2 SERPL-SCNC: 25 MMOL/L — SIGNIFICANT CHANGE UP (ref 22–31)
CO2 SERPL-SCNC: 25 MMOL/L — SIGNIFICANT CHANGE UP (ref 22–31)
CREAT SERPL-MCNC: 0.56 MG/DL — SIGNIFICANT CHANGE UP (ref 0.5–1.3)
CREAT SERPL-MCNC: 0.56 MG/DL — SIGNIFICANT CHANGE UP (ref 0.5–1.3)
EGFR: 100 ML/MIN/1.73M2 — SIGNIFICANT CHANGE UP
EGFR: 100 ML/MIN/1.73M2 — SIGNIFICANT CHANGE UP
EOSINOPHIL # BLD AUTO: 0.02 K/UL — SIGNIFICANT CHANGE UP (ref 0–0.5)
EOSINOPHIL # BLD AUTO: 0.02 K/UL — SIGNIFICANT CHANGE UP (ref 0–0.5)
EOSINOPHIL NFR BLD AUTO: 0.1 % — SIGNIFICANT CHANGE UP (ref 0–6)
EOSINOPHIL NFR BLD AUTO: 0.1 % — SIGNIFICANT CHANGE UP (ref 0–6)
GLUCOSE SERPL-MCNC: 157 MG/DL — HIGH (ref 70–99)
GLUCOSE SERPL-MCNC: 157 MG/DL — HIGH (ref 70–99)
GRAM STN FLD: SIGNIFICANT CHANGE UP
GRAM STN FLD: SIGNIFICANT CHANGE UP
HCT VFR BLD CALC: 40.2 % — SIGNIFICANT CHANGE UP (ref 34.5–45)
HCT VFR BLD CALC: 40.2 % — SIGNIFICANT CHANGE UP (ref 34.5–45)
HGB BLD-MCNC: 13.3 G/DL — SIGNIFICANT CHANGE UP (ref 11.5–15.5)
HGB BLD-MCNC: 13.3 G/DL — SIGNIFICANT CHANGE UP (ref 11.5–15.5)
IANC: 17.28 K/UL — HIGH (ref 1.8–7.4)
IANC: 17.28 K/UL — HIGH (ref 1.8–7.4)
IMM GRANULOCYTES NFR BLD AUTO: 0.5 % — SIGNIFICANT CHANGE UP (ref 0–0.9)
IMM GRANULOCYTES NFR BLD AUTO: 0.5 % — SIGNIFICANT CHANGE UP (ref 0–0.9)
LYMPHOCYTES # BLD AUTO: 0.71 K/UL — LOW (ref 1–3.3)
LYMPHOCYTES # BLD AUTO: 0.71 K/UL — LOW (ref 1–3.3)
LYMPHOCYTES # BLD AUTO: 3.8 % — LOW (ref 13–44)
LYMPHOCYTES # BLD AUTO: 3.8 % — LOW (ref 13–44)
MAGNESIUM SERPL-MCNC: 2 MG/DL — SIGNIFICANT CHANGE UP (ref 1.6–2.6)
MAGNESIUM SERPL-MCNC: 2 MG/DL — SIGNIFICANT CHANGE UP (ref 1.6–2.6)
MCHC RBC-ENTMCNC: 29.3 PG — SIGNIFICANT CHANGE UP (ref 27–34)
MCHC RBC-ENTMCNC: 29.3 PG — SIGNIFICANT CHANGE UP (ref 27–34)
MCHC RBC-ENTMCNC: 33.1 GM/DL — SIGNIFICANT CHANGE UP (ref 32–36)
MCHC RBC-ENTMCNC: 33.1 GM/DL — SIGNIFICANT CHANGE UP (ref 32–36)
MCV RBC AUTO: 88.5 FL — SIGNIFICANT CHANGE UP (ref 80–100)
MCV RBC AUTO: 88.5 FL — SIGNIFICANT CHANGE UP (ref 80–100)
MONOCYTES # BLD AUTO: 0.43 K/UL — SIGNIFICANT CHANGE UP (ref 0–0.9)
MONOCYTES # BLD AUTO: 0.43 K/UL — SIGNIFICANT CHANGE UP (ref 0–0.9)
MONOCYTES NFR BLD AUTO: 2.3 % — SIGNIFICANT CHANGE UP (ref 2–14)
MONOCYTES NFR BLD AUTO: 2.3 % — SIGNIFICANT CHANGE UP (ref 2–14)
NEUTROPHILS # BLD AUTO: 17.28 K/UL — HIGH (ref 1.8–7.4)
NEUTROPHILS # BLD AUTO: 17.28 K/UL — HIGH (ref 1.8–7.4)
NEUTROPHILS NFR BLD AUTO: 92.9 % — HIGH (ref 43–77)
NEUTROPHILS NFR BLD AUTO: 92.9 % — HIGH (ref 43–77)
NRBC # BLD: 0 /100 WBCS — SIGNIFICANT CHANGE UP (ref 0–0)
NRBC # BLD: 0 /100 WBCS — SIGNIFICANT CHANGE UP (ref 0–0)
NRBC # FLD: 0 K/UL — SIGNIFICANT CHANGE UP (ref 0–0)
NRBC # FLD: 0 K/UL — SIGNIFICANT CHANGE UP (ref 0–0)
PHOSPHATE SERPL-MCNC: 3.9 MG/DL — SIGNIFICANT CHANGE UP (ref 2.5–4.5)
PHOSPHATE SERPL-MCNC: 3.9 MG/DL — SIGNIFICANT CHANGE UP (ref 2.5–4.5)
PLATELET # BLD AUTO: 245 K/UL — SIGNIFICANT CHANGE UP (ref 150–400)
PLATELET # BLD AUTO: 245 K/UL — SIGNIFICANT CHANGE UP (ref 150–400)
POTASSIUM SERPL-MCNC: 3.6 MMOL/L — SIGNIFICANT CHANGE UP (ref 3.5–5.3)
POTASSIUM SERPL-MCNC: 3.6 MMOL/L — SIGNIFICANT CHANGE UP (ref 3.5–5.3)
POTASSIUM SERPL-SCNC: 3.6 MMOL/L — SIGNIFICANT CHANGE UP (ref 3.5–5.3)
POTASSIUM SERPL-SCNC: 3.6 MMOL/L — SIGNIFICANT CHANGE UP (ref 3.5–5.3)
PROT SERPL-MCNC: 6 G/DL — SIGNIFICANT CHANGE UP (ref 6–8.3)
PROT SERPL-MCNC: 6 G/DL — SIGNIFICANT CHANGE UP (ref 6–8.3)
RBC # BLD: 4.54 M/UL — SIGNIFICANT CHANGE UP (ref 3.8–5.2)
RBC # BLD: 4.54 M/UL — SIGNIFICANT CHANGE UP (ref 3.8–5.2)
RBC # FLD: 12.1 % — SIGNIFICANT CHANGE UP (ref 10.3–14.5)
RBC # FLD: 12.1 % — SIGNIFICANT CHANGE UP (ref 10.3–14.5)
SODIUM SERPL-SCNC: 142 MMOL/L — SIGNIFICANT CHANGE UP (ref 135–145)
SODIUM SERPL-SCNC: 142 MMOL/L — SIGNIFICANT CHANGE UP (ref 135–145)
SPECIMEN SOURCE: SIGNIFICANT CHANGE UP
SPECIMEN SOURCE: SIGNIFICANT CHANGE UP
WBC # BLD: 18.6 K/UL — HIGH (ref 3.8–10.5)
WBC # BLD: 18.6 K/UL — HIGH (ref 3.8–10.5)
WBC # FLD AUTO: 18.6 K/UL — HIGH (ref 3.8–10.5)
WBC # FLD AUTO: 18.6 K/UL — HIGH (ref 3.8–10.5)

## 2023-10-25 PROCEDURE — 32608 THORACOSCOPY W/BX NODULE: CPT

## 2023-10-25 PROCEDURE — 88305 TISSUE EXAM BY PATHOLOGIST: CPT | Mod: 26

## 2023-10-25 PROCEDURE — 88332 PATH CONSLTJ SURG EA ADD BLK: CPT | Mod: 26

## 2023-10-25 PROCEDURE — 88331 PATH CONSLTJ SURG 1 BLK 1SPC: CPT | Mod: 26

## 2023-10-25 PROCEDURE — 88312 SPECIAL STAINS GROUP 1: CPT | Mod: 26

## 2023-10-25 PROCEDURE — 32674 THORACOSCOPY LYMPH NODE EXC: CPT

## 2023-10-25 PROCEDURE — 71045 X-RAY EXAM CHEST 1 VIEW: CPT | Mod: 26

## 2023-10-25 PROCEDURE — 99291 CRITICAL CARE FIRST HOUR: CPT

## 2023-10-25 PROCEDURE — 88313 SPECIAL STAINS GROUP 2: CPT | Mod: 26

## 2023-10-25 PROCEDURE — 88309 TISSUE EXAM BY PATHOLOGIST: CPT | Mod: 26

## 2023-10-25 DEVICE — STAPLER ETHICON GST ECHELON 60MM GREEN RELOAD: Type: IMPLANTABLE DEVICE | Site: RIGHT | Status: FUNCTIONAL

## 2023-10-25 DEVICE — STAPLER ETHICON ECHELON ENDOPATH 60MM: Type: IMPLANTABLE DEVICE | Site: RIGHT | Status: FUNCTIONAL

## 2023-10-25 DEVICE — LIGATING CLIPS WECK HORIZON LARGE (ORANGE) 24: Type: IMPLANTABLE DEVICE | Site: RIGHT | Status: FUNCTIONAL

## 2023-10-25 DEVICE — SURGICEL FIBRILLAR 2 X 4": Type: IMPLANTABLE DEVICE | Site: RIGHT | Status: FUNCTIONAL

## 2023-10-25 DEVICE — STAPLER ETHICON GST ECHELON 60MM GOLD RELOAD: Type: IMPLANTABLE DEVICE | Site: RIGHT | Status: FUNCTIONAL

## 2023-10-25 DEVICE — VISTASEAL FIBRIN HUMAN 10ML: Type: IMPLANTABLE DEVICE | Site: RIGHT | Status: FUNCTIONAL

## 2023-10-25 DEVICE — CHEST DRAIN THORACIC ARGYLE PVC 20FR STRAIGHT: Type: IMPLANTABLE DEVICE | Site: RIGHT | Status: FUNCTIONAL

## 2023-10-25 DEVICE — LIGATING CLIPS WECK HORIZON MEDIUM (BLUE) 24: Type: IMPLANTABLE DEVICE | Site: RIGHT | Status: FUNCTIONAL

## 2023-10-25 DEVICE — CLIP APPLIER COVIDIEN ENDOCLIP III 5MM: Type: IMPLANTABLE DEVICE | Site: RIGHT | Status: FUNCTIONAL

## 2023-10-25 DEVICE — PROGEL PLEURAL AIR LEAK 4ML: Type: IMPLANTABLE DEVICE | Site: RIGHT | Status: FUNCTIONAL

## 2023-10-25 DEVICE — STAPLER ETHICON ECHELON ENDOPATH VASCULAR 35MM WHITE RELOAD: Type: IMPLANTABLE DEVICE | Site: RIGHT | Status: FUNCTIONAL

## 2023-10-25 RX ORDER — POLYETHYLENE GLYCOL 3350 17 G/17G
17 POWDER, FOR SOLUTION ORAL DAILY
Refills: 0 | Status: DISCONTINUED | OUTPATIENT
Start: 2023-10-25 | End: 2023-10-28

## 2023-10-25 RX ORDER — LEVOFLOXACIN 5 MG/ML
1 INJECTION, SOLUTION INTRAVENOUS
Refills: 0 | DISCHARGE

## 2023-10-25 RX ORDER — ACETAMINOPHEN 500 MG
1000 TABLET ORAL EVERY 6 HOURS
Refills: 0 | Status: COMPLETED | OUTPATIENT
Start: 2023-10-25 | End: 2023-10-26

## 2023-10-25 RX ORDER — ONDANSETRON 8 MG/1
4 TABLET, FILM COATED ORAL EVERY 6 HOURS
Refills: 0 | Status: DISCONTINUED | OUTPATIENT
Start: 2023-10-25 | End: 2023-10-28

## 2023-10-25 RX ORDER — INFLUENZA VIRUS VACCINE 15; 15; 15; 15 UG/.5ML; UG/.5ML; UG/.5ML; UG/.5ML
0.7 SUSPENSION INTRAMUSCULAR ONCE
Refills: 0 | Status: DISCONTINUED | OUTPATIENT
Start: 2023-10-25 | End: 2023-10-28

## 2023-10-25 RX ORDER — IPRATROPIUM/ALBUTEROL SULFATE 18-103MCG
3 AEROSOL WITH ADAPTER (GRAM) INHALATION EVERY 6 HOURS
Refills: 0 | Status: DISCONTINUED | OUTPATIENT
Start: 2023-10-25 | End: 2023-10-28

## 2023-10-25 RX ORDER — SENNA PLUS 8.6 MG/1
2 TABLET ORAL AT BEDTIME
Refills: 0 | Status: DISCONTINUED | OUTPATIENT
Start: 2023-10-25 | End: 2023-10-28

## 2023-10-25 RX ORDER — HYDROMORPHONE HYDROCHLORIDE 2 MG/ML
250 INJECTION INTRAMUSCULAR; INTRAVENOUS; SUBCUTANEOUS
Refills: 0 | Status: DISCONTINUED | OUTPATIENT
Start: 2023-10-25 | End: 2023-10-26

## 2023-10-25 RX ORDER — PHENYLEPHRINE HYDROCHLORIDE 10 MG/ML
0.2 INJECTION INTRAVENOUS
Qty: 40 | Refills: 0 | Status: DISCONTINUED | OUTPATIENT
Start: 2023-10-25 | End: 2023-10-27

## 2023-10-25 RX ORDER — SODIUM CHLORIDE 9 MG/ML
250 INJECTION, SOLUTION INTRAVENOUS ONCE
Refills: 0 | Status: COMPLETED | OUTPATIENT
Start: 2023-10-25 | End: 2023-10-25

## 2023-10-25 RX ORDER — AZELASTINE 137 UG/1
2 SPRAY, METERED NASAL
Refills: 0 | DISCHARGE

## 2023-10-25 RX ORDER — BUDESONIDE AND FORMOTEROL FUMARATE DIHYDRATE 160; 4.5 UG/1; UG/1
2 AEROSOL RESPIRATORY (INHALATION)
Refills: 0 | Status: DISCONTINUED | OUTPATIENT
Start: 2023-10-25 | End: 2023-10-28

## 2023-10-25 RX ORDER — SODIUM CHLORIDE 9 MG/ML
500 INJECTION, SOLUTION INTRAVENOUS ONCE
Refills: 0 | Status: COMPLETED | OUTPATIENT
Start: 2023-10-25 | End: 2023-10-25

## 2023-10-25 RX ORDER — TIOTROPIUM BROMIDE 18 UG/1
2 CAPSULE ORAL; RESPIRATORY (INHALATION) DAILY
Refills: 0 | Status: DISCONTINUED | OUTPATIENT
Start: 2023-10-25 | End: 2023-10-28

## 2023-10-25 RX ORDER — HEPARIN SODIUM 5000 [USP'U]/ML
5000 INJECTION INTRAVENOUS; SUBCUTANEOUS EVERY 12 HOURS
Refills: 0 | Status: DISCONTINUED | OUTPATIENT
Start: 2023-10-25 | End: 2023-10-28

## 2023-10-25 RX ORDER — NALOXONE HYDROCHLORIDE 4 MG/.1ML
0.1 SPRAY NASAL
Refills: 0 | Status: DISCONTINUED | OUTPATIENT
Start: 2023-10-25 | End: 2023-10-28

## 2023-10-25 RX ORDER — FLUTICASONE FUROATE, UMECLIDINIUM BROMIDE AND VILANTEROL TRIFENATATE 200; 62.5; 25 UG/1; UG/1; UG/1
1 POWDER RESPIRATORY (INHALATION)
Refills: 0 | DISCHARGE

## 2023-10-25 RX ORDER — ACETAMINOPHEN 500 MG
975 TABLET ORAL ONCE
Refills: 0 | Status: COMPLETED | OUTPATIENT
Start: 2023-10-25 | End: 2023-10-25

## 2023-10-25 RX ADMIN — Medication 400 MILLIGRAM(S): at 18:06

## 2023-10-25 RX ADMIN — PHENYLEPHRINE HYDROCHLORIDE 4.42 MICROGRAM(S)/KG/MIN: 10 INJECTION INTRAVENOUS at 13:25

## 2023-10-25 RX ADMIN — HYDROMORPHONE HYDROCHLORIDE 250 MILLILITER(S): 2 INJECTION INTRAMUSCULAR; INTRAVENOUS; SUBCUTANEOUS at 14:44

## 2023-10-25 RX ADMIN — SODIUM CHLORIDE 500 MILLILITER(S): 9 INJECTION, SOLUTION INTRAVENOUS at 18:00

## 2023-10-25 RX ADMIN — HYDROMORPHONE HYDROCHLORIDE 250 MILLILITER(S): 2 INJECTION INTRAMUSCULAR; INTRAVENOUS; SUBCUTANEOUS at 13:20

## 2023-10-25 RX ADMIN — SODIUM CHLORIDE 30 MILLILITER(S): 9 INJECTION, SOLUTION INTRAVENOUS at 08:26

## 2023-10-25 RX ADMIN — SENNA PLUS 2 TABLET(S): 8.6 TABLET ORAL at 21:28

## 2023-10-25 RX ADMIN — HYDROMORPHONE HYDROCHLORIDE 250 MILLILITER(S): 2 INJECTION INTRAMUSCULAR; INTRAVENOUS; SUBCUTANEOUS at 14:27

## 2023-10-25 RX ADMIN — Medication 975 MILLIGRAM(S): at 08:26

## 2023-10-25 RX ADMIN — SODIUM CHLORIDE 30 MILLILITER(S): 9 INJECTION, SOLUTION INTRAVENOUS at 13:26

## 2023-10-25 RX ADMIN — HYDROMORPHONE HYDROCHLORIDE 250 MILLILITER(S): 2 INJECTION INTRAMUSCULAR; INTRAVENOUS; SUBCUTANEOUS at 19:09

## 2023-10-25 RX ADMIN — SODIUM CHLORIDE 30 MILLILITER(S): 9 INJECTION, SOLUTION INTRAVENOUS at 19:21

## 2023-10-25 RX ADMIN — Medication 1000 MILLIGRAM(S): at 19:00

## 2023-10-25 RX ADMIN — HEPARIN SODIUM 5000 UNIT(S): 5000 INJECTION INTRAVENOUS; SUBCUTANEOUS at 18:00

## 2023-10-25 RX ADMIN — Medication 3 MILLILITER(S): at 22:28

## 2023-10-25 RX ADMIN — SODIUM CHLORIDE 500 MILLILITER(S): 9 INJECTION, SOLUTION INTRAVENOUS at 21:26

## 2023-10-25 RX ADMIN — PHENYLEPHRINE HYDROCHLORIDE 4.42 MICROGRAM(S)/KG/MIN: 10 INJECTION INTRAVENOUS at 19:20

## 2023-10-25 RX ADMIN — ONDANSETRON 4 MILLIGRAM(S): 8 TABLET, FILM COATED ORAL at 18:57

## 2023-10-25 NOTE — PATIENT PROFILE ADULT - FALL HARM RISK - UNIVERSAL INTERVENTIONS
Bed in lowest position, wheels locked, appropriate side rails in place/Call bell, personal items and telephone in reach/Instruct patient to call for assistance before getting out of bed or chair/Non-slip footwear when patient is out of bed/[place] to call system/Physically safe environment - no spills, clutter or unnecessary equipment/Purposeful Proactive Rounding/Room/bathroom lighting operational, light cord in reach

## 2023-10-25 NOTE — PROGRESS NOTE ADULT - SUBJECTIVE AND OBJECTIVE BOX
CHIEF COMPLAINT: FOLLOW UP IN ICU FOR POSTOPERATIVE CARE OF PATIENT WHO IS S/P       PROCEDURES:       ISSUES:       INTERVAL EVENTS:   OR today. Extubated in OR. Transferred to CTICU.      HISTORY:   Patient reports moderate pain at chest wall incision sites which is worse with coughing and deep breathing without associated fever or dyspnea. Pain is improved with use of pain meds.     PHYSICAL EXAM:   Gen: Comfortable, No acute distress  Eyes: Sclera white, Conjunctiva normal, Eyelids normal, Pupils symmetrical   ENT: Mucous membranes moist,  ,  ,    Neck: Trachea midline,  ,  ,  ,  ,  ,    CV: Rate regular, Rhythm regular,  ,  ,    Resp: Breath sounds clear, No accessory muscles use, R chest tube in place,  ,    Abd: Soft, Non-distended, Non-tender, Bowel sounds normal,  ,  ,    Skin: Warm, No peripheral edema of lower extremities,  ,    : No newell  Neuro: Moving all 4 extremities,    Psych: A&Ox3      ASSESSMENT AND PLAN:     NEURO:  Post-operative Pain - Stable. Pain control with PCEA and Tylenol IV PRN.           RESPIRATORY:  Hypoxia - Wean nasal cannula for goal O2sat above 92. Obtain CXR. Incentive spirometry. Chest PT and frequent suctioning. Continue bronchodilators. OOB to chair & ambulate w/ assistance. Continuous pulse oximetry for support & to prevent decompensation.     Chest tube – Pleurevac regulated water seal. Monitor chest tube output.       COPD - worsened after surgery                 CARDIOVASCULAR:  Hemodynamically unstable, postoperative hypotension -Phenylephrine being actively titrated to keep MAP>65. Check ABG. Continue hemodynamic monitoring.  Telemetry (medical test) - Reviewed by me today independently. Normal sinus rhythm.               RENAL:  Stable - Monitor IOs and electrolytes. Keep K above 4.0 and Mg above 2.0. Newell in place with PCEA in place.            GASTROINTESTINAL:  GI prophylaxis not indicated  Zofran and Reglan IV PRN for nausea  Regular consistency diet              HEMATOLOGIC:  No signs of active bleeding. Monitor Hgb in CBC in AM  DVT prophylaxis with heparin subQ and SCDs.               INFECTIOUS DISEASE:  All surgical sites appear clean. No signs of active infection. Will monitor for fever and leukocytosis.           ENDOCRINE:  Stable – Monitor glucose fingersticks for goal 120-180.           ONCOLOGY:  Lung nodule - Improved. S/P resection. Follow up final pathology.      Pertinent clinical, laboratory, radiographic, hemodynamic, echocardiographic, respiratory data, microbiologic data and chart were reviewed by myself and analyzed frequently throughout the course of the day and night by myself.    Plan discussed at length with the CTICU staff and Attending CT Surgeon -   Dr. Nikita Villegas    Patient's status was discussed with patient at bedside.    Patient required critical care management.  75 minutes were spent evaluating, managing, providing, coordinating, and documenting care for this patient, exclusive of procedures from  AM/PM to 1159PM.    ________________________________________________      _________________________  VITAL SIGNS:  Vital Signs Last 24 Hrs  T(C): 36.4 (25 Oct 2023 15:00), Max: 36.6 (25 Oct 2023 07:59)  T(F): 97.5 (25 Oct 2023 15:00), Max: 97.9 (25 Oct 2023 07:59)  HR: 62 (25 Oct 2023 15:00) (60 - 91)  BP: 119/67 (25 Oct 2023 15:00) (85/49 - 122/88)  BP(mean): 83 (25 Oct 2023 15:00) (58 - 83)  RR: 12 (25 Oct 2023 15:00) (12 - 20)  SpO2: 100% (25 Oct 2023 15:00) (95% - 100%)    Parameters below as of 25 Oct 2023 15:00  Patient On (Oxygen Delivery Method): nasal cannula  O2 Flow (L/min): 2    I/Os:   I&O's Detail    25 Oct 2023 07:01  -  25 Oct 2023 15:16  --------------------------------------------------------  IN:    Lactated Ringers: 90 mL    Phenylephrine: 33.2 mL  Total IN: 123.2 mL    OUT:    Indwelling Catheter - Urethral (mL): 95 mL  Total OUT: 95 mL    Total NET: 28.2 mL              MEDICATIONS:  MEDICATIONS  (STANDING):  acetaminophen   IVPB .. 1000 milliGRAM(s) IV Intermittent every 6 hours  albuterol/ipratropium for Nebulization 3 milliLiter(s) Nebulizer every 6 hours  budesonide  80 MICROgram(s)/formoterol 4.5 MICROgram(s) Inhaler 2 Puff(s) Inhalation two times a day  heparin   Injectable 5000 Unit(s) SubCutaneous every 12 hours  hydromorphone (10 MICROgram(s)/mL) + bupivacaine 0.0625% in 0.9% Sodium Chloride PCEA 250 milliLiter(s) Epidural PCA Continuous  lactated ringers. 1000 milliLiter(s) (30 mL/Hr) IV Continuous <Continuous>  phenylephrine    Infusion 0.2 MICROgram(s)/kG/Min (4.42 mL/Hr) IV Continuous <Continuous>  polyethylene glycol 3350 17 Gram(s) Oral daily  senna 2 Tablet(s) Oral at bedtime  tiotropium 2.5 MICROgram(s) Inhaler 2 Puff(s) Inhalation daily    MEDICATIONS  (PRN):  hydromorphone (10 MICROgram(s)/mL) + bupivacaine 0.0625% in 0.9% Sodium Chloride PCEA Rescue Clinician  Bolus 5 milliLiter(s) Epidural every 15 minutes PRN for Pain Score greater than 6  naloxone Injectable 0.1 milliGRAM(s) IV Push every 3 minutes PRN For ANY of the following changes in patient status:  A. RR LESS THAN 10 breaths per minute, B. Oxygen saturation LESS THAN 90%, C. Sedation score of 6  ondansetron Injectable 4 milliGRAM(s) IV Push every 6 hours PRN Nausea      LABS:  Laboratory data was independently reviewed by me today.                           13.3   18.60 )-----------( 245      ( 25 Oct 2023 13:45 )             40.2     10-25    142  |  107  |  15  ----------------------------<  157<H>  3.6   |  25  |  0.56    Ca    9.0      25 Oct 2023 13:45  Phos  3.9     10-25  Mg     2.00     10-25    TPro  6.0  /  Alb  3.9  /  TBili  0.3  /  DBili  x   /  AST  14  /  ALT  16  /  AlkPhos  69  10-25    LIVER FUNCTIONS - ( 25 Oct 2023 13:45 )  Alb: 3.9 g/dL / Pro: 6.0 g/dL / ALK PHOS: 69 U/L / ALT: 16 U/L / AST: 14 U/L / GGT: x             ABG - ( 25 Oct 2023 14:15 )  pH, Arterial: 7.32  pH, Blood: x     /  pCO2: 52    /  pO2: 159   / HCO3: 27    / Base Excess: -0.2  /  SaO2: 98.6              Urinalysis Basic - ( 25 Oct 2023 13:45 )    Color: x / Appearance: x / SG: x / pH: x  Gluc: 157 mg/dL / Ketone: x  / Bili: x / Urobili: x   Blood: x / Protein: x / Nitrite: x   Leuk Esterase: x / RBC: x / WBC x   Sq Epi: x / Non Sq Epi: x / Bacteria: x        RADIOLOGY:   Radiology images were independently reviewed by me today. Reports were reviewed by me today.    Xray Chest 1 View- PORTABLE-Urgent:   ACC: [id number] EXAM:  XR CHEST PORTABLE URGENT 1V   ORDERED BY: SAYRA WATSON     PROCEDURE DATE:  10/25/2023          INTERPRETATION:  EXAMINATION: XR CHEST URGENT    CLINICAL INDICATION: s/p right vats lobectomy    TECHNIQUE: Single frontal, portable view of the chest was obtained.    COMPARISON: Chest x-ray None.    FINDINGS:  Right-sided chest tube. Mediastinal drains are present. Surgical material   seen in the mediastinum.  The heart is normal in size.  The lungs are clear.  No pleural effusion. Trace right apical pneumothorax.  No acute bony abnormality.    IMPRESSION: Status post right thoracotomy with chest tube and clear lungs.      --- End of Report ---          ANTONIA CLARKE DO; Resident Radiologist  This document has beenelectronically signed.  EDIL RAMIRES MD; Attending Radiologist  This document has been electronically signed. Oct 25 2023  2:06PM (10-25-23 @ 13:24)           CHIEF COMPLAINT: FOLLOW UP IN ICU FOR POSTOPERATIVE CARE OF PATIENT WHO IS S/P RUL lobectomy      PROCEDURES:       Flexible bronchoscopy, Right uniportal VATS, Right upper lobectomy, Mediastinal lymph node dissection with nodes.25-Oct-2023      ISSUES:                     Lung nodule                  Postop pain                  Chest tube in place                  Postoperative hypotension requiring IV pressors  COPD, severe  H/o TRISTAN lobectomy in 05/2022  Hx of kidney stones s/p R ureter stent (5/2018)   Hx of shingles                  Former smoker      INTERVAL EVENTS:   OR today. Extubated in OR. Transferred to CTICU.      HISTORY:   Patient reports moderate pain at chest wall incision sites which is worse with coughing and deep breathing without associated fever or dyspnea. Pain is improved with use of pain meds.     PHYSICAL EXAM:   Gen: Comfortable, No acute distress  Eyes: Sclera white, Conjunctiva normal, Eyelids normal, Pupils symmetrical   ENT: Mucous membranes moist,  ,  ,    Neck: Trachea midline,  ,  ,  ,  ,  ,    CV: Rate regular, Rhythm regular,  ,  ,    Resp: Breath sounds clear, No accessory muscles use, R chest tube in place,  ,    Abd: Soft, Non-distended, Non-tender, Bowel sounds normal,  ,  ,    Skin: Warm, No peripheral edema of lower extremities,  ,    :newell  Neuro: Moving all 4 extremities,    Psych: A&Ox3      ASSESSMENT AND PLAN:     NEURO:  Post-operative Pain - Stable. Pain control with PCEA and Tylenol IV PRN.           RESPIRATORY:  Hypoxia - Wean nasal cannula for goal O2sat above 92. Obtain CXR. Incentive spirometry. Chest PT and frequent suctioning. Continue bronchodilators. OOB to chair & ambulate w/ assistance. Continuous pulse oximetry for support & to prevent decompensation.     Chest tube – Pleurevac regulated water seal. Monitor chest tube output.       COPD - worsened after surgery                 CARDIOVASCULAR:  Hemodynamically unstable, postoperative hypotension -Phenylephrine being actively titrated to keep MAP>65. Check ABG. Continue hemodynamic monitoring.  Telemetry (medical test) - Reviewed by me today independently. Normal sinus rhythm.               RENAL:  Stable - Monitor IOs and electrolytes. Keep K above 4.0 and Mg above 2.0. Newell in place with PCEA in place.            GASTROINTESTINAL:  GI prophylaxis not indicated  Zofran and Reglan IV PRN for nausea  Regular consistency diet              HEMATOLOGIC:  No signs of active bleeding. Monitor Hgb in CBC in AM  DVT prophylaxis with heparin subQ and SCDs.               INFECTIOUS DISEASE:  All surgical sites appear clean. No signs of active infection. Will monitor for fever and leukocytosis.           ENDOCRINE:  Stable – Monitor glucose fingersticks for goal 120-180.           ONCOLOGY:  Lung nodule - Improved. S/P resection. Follow up final pathology.      Pertinent clinical, laboratory, radiographic, hemodynamic, echocardiographic, respiratory data, microbiologic data and chart were reviewed by myself and analyzed frequently throughout the course of the day and night by myself.    Plan discussed at length with the CTICU staff and Attending CT Surgeon -   Dr. Nikita Villegas    Patient's status was discussed with patient at bedside.    Patient required critical care management.  75 minutes were spent evaluating, managing, providing, coordinating, and documenting care for this patient, exclusive of procedures from  AM/PM to 1159PM.    ________________________________________________      _________________________  VITAL SIGNS:  Vital Signs Last 24 Hrs  T(C): 36.4 (25 Oct 2023 15:00), Max: 36.6 (25 Oct 2023 07:59)  T(F): 97.5 (25 Oct 2023 15:00), Max: 97.9 (25 Oct 2023 07:59)  HR: 62 (25 Oct 2023 15:00) (60 - 91)  BP: 119/67 (25 Oct 2023 15:00) (85/49 - 122/88)  BP(mean): 83 (25 Oct 2023 15:00) (58 - 83)  RR: 12 (25 Oct 2023 15:00) (12 - 20)  SpO2: 100% (25 Oct 2023 15:00) (95% - 100%)    Parameters below as of 25 Oct 2023 15:00  Patient On (Oxygen Delivery Method): nasal cannula  O2 Flow (L/min): 2    I/Os:   I&O's Detail    25 Oct 2023 07:01  -  25 Oct 2023 15:16  --------------------------------------------------------  IN:    Lactated Ringers: 90 mL    Phenylephrine: 33.2 mL  Total IN: 123.2 mL    OUT:    Indwelling Catheter - Urethral (mL): 95 mL  Total OUT: 95 mL    Total NET: 28.2 mL              MEDICATIONS:  MEDICATIONS  (STANDING):  acetaminophen   IVPB .. 1000 milliGRAM(s) IV Intermittent every 6 hours  albuterol/ipratropium for Nebulization 3 milliLiter(s) Nebulizer every 6 hours  budesonide  80 MICROgram(s)/formoterol 4.5 MICROgram(s) Inhaler 2 Puff(s) Inhalation two times a day  heparin   Injectable 5000 Unit(s) SubCutaneous every 12 hours  hydromorphone (10 MICROgram(s)/mL) + bupivacaine 0.0625% in 0.9% Sodium Chloride PCEA 250 milliLiter(s) Epidural PCA Continuous  lactated ringers. 1000 milliLiter(s) (30 mL/Hr) IV Continuous <Continuous>  phenylephrine    Infusion 0.2 MICROgram(s)/kG/Min (4.42 mL/Hr) IV Continuous <Continuous>  polyethylene glycol 3350 17 Gram(s) Oral daily  senna 2 Tablet(s) Oral at bedtime  tiotropium 2.5 MICROgram(s) Inhaler 2 Puff(s) Inhalation daily    MEDICATIONS  (PRN):  hydromorphone (10 MICROgram(s)/mL) + bupivacaine 0.0625% in 0.9% Sodium Chloride PCEA Rescue Clinician  Bolus 5 milliLiter(s) Epidural every 15 minutes PRN for Pain Score greater than 6  naloxone Injectable 0.1 milliGRAM(s) IV Push every 3 minutes PRN For ANY of the following changes in patient status:  A. RR LESS THAN 10 breaths per minute, B. Oxygen saturation LESS THAN 90%, C. Sedation score of 6  ondansetron Injectable 4 milliGRAM(s) IV Push every 6 hours PRN Nausea      LABS:  Laboratory data was independently reviewed by me today.                           13.3   18.60 )-----------( 245      ( 25 Oct 2023 13:45 )             40.2     10-25    142  |  107  |  15  ----------------------------<  157<H>  3.6   |  25  |  0.56    Ca    9.0      25 Oct 2023 13:45  Phos  3.9     10-25  Mg     2.00     10-25    TPro  6.0  /  Alb  3.9  /  TBili  0.3  /  DBili  x   /  AST  14  /  ALT  16  /  AlkPhos  69  10-25    LIVER FUNCTIONS - ( 25 Oct 2023 13:45 )  Alb: 3.9 g/dL / Pro: 6.0 g/dL / ALK PHOS: 69 U/L / ALT: 16 U/L / AST: 14 U/L / GGT: x             ABG - ( 25 Oct 2023 14:15 )  pH, Arterial: 7.32  pH, Blood: x     /  pCO2: 52    /  pO2: 159   / HCO3: 27    / Base Excess: -0.2  /  SaO2: 98.6              Urinalysis Basic - ( 25 Oct 2023 13:45 )    Color: x / Appearance: x / SG: x / pH: x  Gluc: 157 mg/dL / Ketone: x  / Bili: x / Urobili: x   Blood: x / Protein: x / Nitrite: x   Leuk Esterase: x / RBC: x / WBC x   Sq Epi: x / Non Sq Epi: x / Bacteria: x        RADIOLOGY:   Radiology images were independently reviewed by me today. Reports were reviewed by me today.    Xray Chest 1 View- PORTABLE-Urgent:   ACC: [id number] EXAM:  XR CHEST PORTABLE URGENT 1V   ORDERED BY: SAYRA WATSON     PROCEDURE DATE:  10/25/2023          INTERPRETATION:  EXAMINATION: XR CHEST URGENT    CLINICAL INDICATION: s/p right vats lobectomy    TECHNIQUE: Single frontal, portable view of the chest was obtained.    COMPARISON: Chest x-ray None.    FINDINGS:  Right-sided chest tube. Mediastinal drains are present. Surgical material   seen in the mediastinum.  The heart is normal in size.  The lungs are clear.  No pleural effusion. Trace right apical pneumothorax.  No acute bony abnormality.    IMPRESSION: Status post right thoracotomy with chest tube and clear lungs.      --- End of Report ---          ANTONIA CLARKE DO; Resident Radiologist  This document has beenelectronically signed.  EDIL RAMIRES MD; Attending Radiologist  This document has been electronically signed. Oct 25 2023  2:06PM (10-25-23 @ 13:24)

## 2023-10-25 NOTE — BRIEF OPERATIVE NOTE - SPECIMENS
Left upper lobe, left upper lobe nodule for culture, nodes sampled from stations 3, R4, 7, 8, 9, 10, 11, 12

## 2023-10-25 NOTE — PATIENT PROFILE ADULT - PATIENT'S GENDER IDENTITY
Female Cibinqo Counseling: I discussed with the patient the risks of Cibinqo therapy including but not limited to common cold, nausea, headache, cold sores, increased blood CPK levels, dizziness, UTIs, fatigue, acne, and vomitting. Live vaccines should be avoided.  This medication has been linked to serious infections; higher rate of mortality; malignancy and lymphoproliferative disorders; major adverse cardiovascular events; thrombosis; thrombocytopenia and lymphopenia; lipid elevations; and retinal detachment.

## 2023-10-25 NOTE — BRIEF OPERATIVE NOTE - NSICDXBRIEFPROCEDURE_GEN_ALL_CORE_FT
PROCEDURES:  VATS, with lobectomy 25-Oct-2023 12:41:53 Right uniprotal VATS, right upper lobectomy with mediastinal lymph node dissection Yana Anthony  Flexible bronchoscopy 25-Oct-2023 12:42:02  Yana Anthony  Thoracoscopy, with lymph node dissection 25-Oct-2023 12:43:19  Yana Anthony

## 2023-10-26 LAB
ANION GAP SERPL CALC-SCNC: 8 MMOL/L — SIGNIFICANT CHANGE UP (ref 7–14)
ANION GAP SERPL CALC-SCNC: 8 MMOL/L — SIGNIFICANT CHANGE UP (ref 7–14)
APTT BLD: 24.8 SEC — SIGNIFICANT CHANGE UP (ref 24.5–35.6)
APTT BLD: 24.8 SEC — SIGNIFICANT CHANGE UP (ref 24.5–35.6)
BUN SERPL-MCNC: 13 MG/DL — SIGNIFICANT CHANGE UP (ref 7–23)
BUN SERPL-MCNC: 13 MG/DL — SIGNIFICANT CHANGE UP (ref 7–23)
CALCIUM SERPL-MCNC: 8.6 MG/DL — SIGNIFICANT CHANGE UP (ref 8.4–10.5)
CALCIUM SERPL-MCNC: 8.6 MG/DL — SIGNIFICANT CHANGE UP (ref 8.4–10.5)
CHLORIDE SERPL-SCNC: 105 MMOL/L — SIGNIFICANT CHANGE UP (ref 98–107)
CHLORIDE SERPL-SCNC: 105 MMOL/L — SIGNIFICANT CHANGE UP (ref 98–107)
CO2 SERPL-SCNC: 25 MMOL/L — SIGNIFICANT CHANGE UP (ref 22–31)
CO2 SERPL-SCNC: 25 MMOL/L — SIGNIFICANT CHANGE UP (ref 22–31)
CREAT SERPL-MCNC: 0.47 MG/DL — LOW (ref 0.5–1.3)
CREAT SERPL-MCNC: 0.47 MG/DL — LOW (ref 0.5–1.3)
EGFR: 104 ML/MIN/1.73M2 — SIGNIFICANT CHANGE UP
EGFR: 104 ML/MIN/1.73M2 — SIGNIFICANT CHANGE UP
GLUCOSE BLDC GLUCOMTR-MCNC: 133 MG/DL — HIGH (ref 70–99)
GLUCOSE BLDC GLUCOMTR-MCNC: 133 MG/DL — HIGH (ref 70–99)
GLUCOSE SERPL-MCNC: 127 MG/DL — HIGH (ref 70–99)
GLUCOSE SERPL-MCNC: 127 MG/DL — HIGH (ref 70–99)
HCT VFR BLD CALC: 37.7 % — SIGNIFICANT CHANGE UP (ref 34.5–45)
HCT VFR BLD CALC: 37.7 % — SIGNIFICANT CHANGE UP (ref 34.5–45)
HGB BLD-MCNC: 12.2 G/DL — SIGNIFICANT CHANGE UP (ref 11.5–15.5)
HGB BLD-MCNC: 12.2 G/DL — SIGNIFICANT CHANGE UP (ref 11.5–15.5)
INR BLD: 1.02 RATIO — SIGNIFICANT CHANGE UP (ref 0.85–1.18)
INR BLD: 1.02 RATIO — SIGNIFICANT CHANGE UP (ref 0.85–1.18)
MAGNESIUM SERPL-MCNC: 2 MG/DL — SIGNIFICANT CHANGE UP (ref 1.6–2.6)
MAGNESIUM SERPL-MCNC: 2 MG/DL — SIGNIFICANT CHANGE UP (ref 1.6–2.6)
MCHC RBC-ENTMCNC: 29.3 PG — SIGNIFICANT CHANGE UP (ref 27–34)
MCHC RBC-ENTMCNC: 29.3 PG — SIGNIFICANT CHANGE UP (ref 27–34)
MCHC RBC-ENTMCNC: 32.4 GM/DL — SIGNIFICANT CHANGE UP (ref 32–36)
MCHC RBC-ENTMCNC: 32.4 GM/DL — SIGNIFICANT CHANGE UP (ref 32–36)
MCV RBC AUTO: 90.4 FL — SIGNIFICANT CHANGE UP (ref 80–100)
MCV RBC AUTO: 90.4 FL — SIGNIFICANT CHANGE UP (ref 80–100)
NIGHT BLUE STAIN TISS: SIGNIFICANT CHANGE UP
NIGHT BLUE STAIN TISS: SIGNIFICANT CHANGE UP
NRBC # BLD: 0 /100 WBCS — SIGNIFICANT CHANGE UP (ref 0–0)
NRBC # BLD: 0 /100 WBCS — SIGNIFICANT CHANGE UP (ref 0–0)
NRBC # FLD: 0 K/UL — SIGNIFICANT CHANGE UP (ref 0–0)
NRBC # FLD: 0 K/UL — SIGNIFICANT CHANGE UP (ref 0–0)
PHOSPHATE SERPL-MCNC: 3.6 MG/DL — SIGNIFICANT CHANGE UP (ref 2.5–4.5)
PHOSPHATE SERPL-MCNC: 3.6 MG/DL — SIGNIFICANT CHANGE UP (ref 2.5–4.5)
PLATELET # BLD AUTO: 185 K/UL — SIGNIFICANT CHANGE UP (ref 150–400)
PLATELET # BLD AUTO: 185 K/UL — SIGNIFICANT CHANGE UP (ref 150–400)
POTASSIUM SERPL-MCNC: 4 MMOL/L — SIGNIFICANT CHANGE UP (ref 3.5–5.3)
POTASSIUM SERPL-MCNC: 4 MMOL/L — SIGNIFICANT CHANGE UP (ref 3.5–5.3)
POTASSIUM SERPL-SCNC: 4 MMOL/L — SIGNIFICANT CHANGE UP (ref 3.5–5.3)
POTASSIUM SERPL-SCNC: 4 MMOL/L — SIGNIFICANT CHANGE UP (ref 3.5–5.3)
PROTHROM AB SERPL-ACNC: 11.4 SEC — SIGNIFICANT CHANGE UP (ref 9.5–13)
PROTHROM AB SERPL-ACNC: 11.4 SEC — SIGNIFICANT CHANGE UP (ref 9.5–13)
RBC # BLD: 4.17 M/UL — SIGNIFICANT CHANGE UP (ref 3.8–5.2)
RBC # BLD: 4.17 M/UL — SIGNIFICANT CHANGE UP (ref 3.8–5.2)
RBC # FLD: 12.3 % — SIGNIFICANT CHANGE UP (ref 10.3–14.5)
RBC # FLD: 12.3 % — SIGNIFICANT CHANGE UP (ref 10.3–14.5)
SODIUM SERPL-SCNC: 138 MMOL/L — SIGNIFICANT CHANGE UP (ref 135–145)
SODIUM SERPL-SCNC: 138 MMOL/L — SIGNIFICANT CHANGE UP (ref 135–145)
SPECIMEN SOURCE: SIGNIFICANT CHANGE UP
SPECIMEN SOURCE: SIGNIFICANT CHANGE UP
WBC # BLD: 15.77 K/UL — HIGH (ref 3.8–10.5)
WBC # BLD: 15.77 K/UL — HIGH (ref 3.8–10.5)
WBC # FLD AUTO: 15.77 K/UL — HIGH (ref 3.8–10.5)
WBC # FLD AUTO: 15.77 K/UL — HIGH (ref 3.8–10.5)

## 2023-10-26 PROCEDURE — 99291 CRITICAL CARE FIRST HOUR: CPT

## 2023-10-26 PROCEDURE — 71045 X-RAY EXAM CHEST 1 VIEW: CPT | Mod: 26,76

## 2023-10-26 RX ORDER — ACETAMINOPHEN 500 MG
1000 TABLET ORAL ONCE
Refills: 0 | Status: COMPLETED | OUTPATIENT
Start: 2023-10-26 | End: 2023-10-26

## 2023-10-26 RX ORDER — SODIUM CHLORIDE 9 MG/ML
250 INJECTION, SOLUTION INTRAVENOUS ONCE
Refills: 0 | Status: COMPLETED | OUTPATIENT
Start: 2023-10-26 | End: 2023-10-26

## 2023-10-26 RX ORDER — LIDOCAINE 4 G/100G
1 CREAM TOPICAL DAILY
Refills: 0 | Status: DISCONTINUED | OUTPATIENT
Start: 2023-10-26 | End: 2023-10-28

## 2023-10-26 RX ORDER — SODIUM CHLORIDE 9 MG/ML
500 INJECTION, SOLUTION INTRAVENOUS ONCE
Refills: 0 | Status: COMPLETED | OUTPATIENT
Start: 2023-10-26 | End: 2023-10-26

## 2023-10-26 RX ORDER — METOCLOPRAMIDE HCL 10 MG
10 TABLET ORAL ONCE
Refills: 0 | Status: COMPLETED | OUTPATIENT
Start: 2023-10-26 | End: 2023-10-26

## 2023-10-26 RX ORDER — TRAMADOL HYDROCHLORIDE 50 MG/1
50 TABLET ORAL EVERY 4 HOURS
Refills: 0 | Status: DISCONTINUED | OUTPATIENT
Start: 2023-10-26 | End: 2023-10-28

## 2023-10-26 RX ORDER — TRAMADOL HYDROCHLORIDE 50 MG/1
25 TABLET ORAL EVERY 4 HOURS
Refills: 0 | Status: DISCONTINUED | OUTPATIENT
Start: 2023-10-26 | End: 2023-10-26

## 2023-10-26 RX ORDER — OXYCODONE HYDROCHLORIDE 5 MG/1
2.5 TABLET ORAL EVERY 4 HOURS
Refills: 0 | Status: DISCONTINUED | OUTPATIENT
Start: 2023-10-26 | End: 2023-10-26

## 2023-10-26 RX ORDER — ACETAMINOPHEN 500 MG
1000 TABLET ORAL ONCE
Refills: 0 | Status: DISCONTINUED | OUTPATIENT
Start: 2023-10-26 | End: 2023-10-28

## 2023-10-26 RX ORDER — TRAMADOL HYDROCHLORIDE 50 MG/1
25 TABLET ORAL EVERY 4 HOURS
Refills: 0 | Status: DISCONTINUED | OUTPATIENT
Start: 2023-10-26 | End: 2023-10-28

## 2023-10-26 RX ORDER — MIDODRINE HYDROCHLORIDE 2.5 MG/1
10 TABLET ORAL ONCE
Refills: 0 | Status: COMPLETED | OUTPATIENT
Start: 2023-10-26 | End: 2023-10-26

## 2023-10-26 RX ORDER — MIDODRINE HYDROCHLORIDE 2.5 MG/1
10 TABLET ORAL EVERY 8 HOURS
Refills: 0 | Status: DISCONTINUED | OUTPATIENT
Start: 2023-10-26 | End: 2023-10-26

## 2023-10-26 RX ADMIN — Medication 10 MILLIGRAM(S): at 10:45

## 2023-10-26 RX ADMIN — TRAMADOL HYDROCHLORIDE 50 MILLIGRAM(S): 50 TABLET ORAL at 22:15

## 2023-10-26 RX ADMIN — HEPARIN SODIUM 5000 UNIT(S): 5000 INJECTION INTRAVENOUS; SUBCUTANEOUS at 18:00

## 2023-10-26 RX ADMIN — SODIUM CHLORIDE 30 MILLILITER(S): 9 INJECTION, SOLUTION INTRAVENOUS at 08:03

## 2023-10-26 RX ADMIN — Medication 400 MILLIGRAM(S): at 12:00

## 2023-10-26 RX ADMIN — Medication 400 MILLIGRAM(S): at 18:30

## 2023-10-26 RX ADMIN — Medication 400 MILLIGRAM(S): at 05:54

## 2023-10-26 RX ADMIN — SENNA PLUS 2 TABLET(S): 8.6 TABLET ORAL at 21:15

## 2023-10-26 RX ADMIN — Medication 3 MILLILITER(S): at 11:27

## 2023-10-26 RX ADMIN — TRAMADOL HYDROCHLORIDE 50 MILLIGRAM(S): 50 TABLET ORAL at 21:15

## 2023-10-26 RX ADMIN — Medication 1000 MILLIGRAM(S): at 01:02

## 2023-10-26 RX ADMIN — Medication 3 MILLILITER(S): at 03:39

## 2023-10-26 RX ADMIN — Medication 3 MILLILITER(S): at 21:47

## 2023-10-26 RX ADMIN — HEPARIN SODIUM 5000 UNIT(S): 5000 INJECTION INTRAVENOUS; SUBCUTANEOUS at 05:54

## 2023-10-26 RX ADMIN — ONDANSETRON 4 MILLIGRAM(S): 8 TABLET, FILM COATED ORAL at 06:06

## 2023-10-26 RX ADMIN — Medication 1000 MILLIGRAM(S): at 12:30

## 2023-10-26 RX ADMIN — MIDODRINE HYDROCHLORIDE 10 MILLIGRAM(S): 2.5 TABLET ORAL at 18:20

## 2023-10-26 RX ADMIN — SODIUM CHLORIDE 30 MILLILITER(S): 9 INJECTION, SOLUTION INTRAVENOUS at 19:01

## 2023-10-26 RX ADMIN — SODIUM CHLORIDE 500 MILLILITER(S): 9 INJECTION, SOLUTION INTRAVENOUS at 18:00

## 2023-10-26 RX ADMIN — Medication 1000 MILLIGRAM(S): at 00:00

## 2023-10-26 RX ADMIN — BUDESONIDE AND FORMOTEROL FUMARATE DIHYDRATE 2 PUFF(S): 160; 4.5 AEROSOL RESPIRATORY (INHALATION) at 11:27

## 2023-10-26 RX ADMIN — TRAMADOL HYDROCHLORIDE 25 MILLIGRAM(S): 50 TABLET ORAL at 15:20

## 2023-10-26 RX ADMIN — LIDOCAINE 1 PATCH: 4 CREAM TOPICAL at 18:11

## 2023-10-26 RX ADMIN — TRAMADOL HYDROCHLORIDE 25 MILLIGRAM(S): 50 TABLET ORAL at 16:00

## 2023-10-26 RX ADMIN — PHENYLEPHRINE HYDROCHLORIDE 4.42 MICROGRAM(S)/KG/MIN: 10 INJECTION INTRAVENOUS at 17:59

## 2023-10-26 RX ADMIN — Medication 1000 MILLIGRAM(S): at 19:00

## 2023-10-26 RX ADMIN — Medication 3 MILLILITER(S): at 16:06

## 2023-10-26 RX ADMIN — PHENYLEPHRINE HYDROCHLORIDE 4.42 MICROGRAM(S)/KG/MIN: 10 INJECTION INTRAVENOUS at 08:03

## 2023-10-26 RX ADMIN — BUDESONIDE AND FORMOTEROL FUMARATE DIHYDRATE 2 PUFF(S): 160; 4.5 AEROSOL RESPIRATORY (INHALATION) at 21:50

## 2023-10-26 RX ADMIN — SODIUM CHLORIDE 250 MILLILITER(S): 9 INJECTION, SOLUTION INTRAVENOUS at 14:00

## 2023-10-26 RX ADMIN — HYDROMORPHONE HYDROCHLORIDE 250 MILLILITER(S): 2 INJECTION INTRAMUSCULAR; INTRAVENOUS; SUBCUTANEOUS at 08:02

## 2023-10-26 RX ADMIN — Medication 400 MILLIGRAM(S): at 00:32

## 2023-10-26 RX ADMIN — LIDOCAINE 1 PATCH: 4 CREAM TOPICAL at 18:01

## 2023-10-26 NOTE — PROGRESS NOTE ADULT - SUBJECTIVE AND OBJECTIVE BOX
DEMI CHACKO      68y   Female   MRN-[id number]         penicillin (Rash)  latex (Rash)             Daily     Daily Weight in k.7 (26 Oct 2023 06:00)Drug Dosing Weight  Height (cm): 158.8 (25 Oct 2023 07:59)  Weight (kg): 58.967 (25 Oct 2023 07:59)  BMI (kg/m2): 23.4 (25 Oct 2023 07:59)  BSA (m2): 1.6 (25 Oct 2023 07:59)    HPI:  67 yo female with h/o COPD and lung cancer s/p left lung resection in 2022 - was discharged on home O2 for about 1 month.  Pt now has right lung nodule that is enlarging. Pt is scheduled for flexible bronchoscopy, right video assisted thoracoscopic surgery, right upper lobectomy, possible thoracotomy with epidural.  (19 Oct 2023 07:51)      CHIEF COMPLAINT: Follow up in ICU  for postoperative care of patient who is s/p lung surgery    Procedure: Flexible bronchoscopy, Right uniportal VATS, Right upper lobectomy, Mediastinal lymph node dissection with nodes.25-Oct-2023                       Issues:  Postoperative hypotension without shock requiring IV pressors              Lung nodule              Postop pain              Chest tube in place  COPD, severe  H/o TRISTAN lobectomy in 2022  Hx of kidney stones s/p R ureter stent (2018)   Hx of shingles              Former smoker    Postop course:     Patient reports moderate pain at chest wall incision sites which is worse with coughing and deep breathing without associated fever or dyspnea. Pain is improved with use of PCA and  oral pain meds.         Home Medications:  Astelin 137 mcg/inh nasal spray: 2 spray(s) intranasally once a day as needed (25 Oct 2023 08:18)  Levaquin 750 mg oral tablet: 1 tab(s) orally once a day LD 10/6/2023 (25 Oct 2023 08:18)  Trelegy Ellipta 100 mcg-62.5 mcg-25 mcg/inh inhalation powder: 1 puff(s) inhaled once a day (25 Oct 2023 08:18)    PAST MEDICAL & SURGICAL HISTORY:  Emphysema lung      Shingles      Renal calculi      Pulmonary nodule      Former smoker      Lung cancer      History of RSV infection      Basal cell carcinoma      History of laparoscopic cholecystectomy        S/P cystoscopy  with right renal stent insertion 18      History of tonsillectomy      History of hernia repair      History of lung surgery      H/O basal cell carcinoma excision        Vital Signs Last 24 Hrs  T(C): 36.8 (26 Oct 2023 08:00), Max: 36.8 (26 Oct 2023 08:00)  T(F): 98.2 (26 Oct 2023 08:00), Max: 98.2 (26 Oct 2023 08:00)  HR: 74 (26 Oct 2023 11:) (57 - 76)  BP: 96/55 (26 Oct 2023 11:00) (85/49 - 122/57)  BP(mean): 69 (26 Oct 2023 11:) (58 - 83)  RR: 10 (26 Oct 2023 11:) (10 - 20)  SpO2: 93% (26 Oct 2023 11:) (93% - 100%)    Parameters below as of 26 Oct 2023 11:00  Patient On (Oxygen Delivery Method): room air      I&O's Detail    25 Oct 2023 07:01  -  26 Oct 2023 07:00  --------------------------------------------------------  IN:    IV PiggyBack: 300 mL    Lactated Ringers: 570 mL    Lactated Ringers Bolus: 750 mL    Phenylephrine: 243.6 mL  Total IN: 1863.6 mL    OUT:    Chest Tube (mL): 0 mL    Indwelling Catheter - Urethral (mL): 640 mL  Total OUT: 640 mL    Total NET: 1223.6 mL      26 Oct 2023 07:01  -  26 Oct 2023 11:01  --------------------------------------------------------  IN:    Lactated Ringers: 120 mL    Phenylephrine: 70.8 mL  Total IN: 190.8 mL    OUT:    Chest Tube (mL): 0 mL    Indwelling Catheter - Urethral (mL): 160 mL  Total OUT: 160 mL    Total NET: 30.8 mL        CAPILLARY BLOOD GLUCOSE        Home Medications:  Astelin 137 mcg/inh nasal spray: 2 spray(s) intranasally once a day as needed (25 Oct 2023 08:18)  Levaquin 750 mg oral tablet: 1 tab(s) orally once a day LD 10/6/2023 (25 Oct 2023 08:18)  Trelegy Ellipta 100 mcg-62.5 mcg-25 mcg/inh inhalation powder: 1 puff(s) inhaled once a day (25 Oct 2023 08:18)    MEDICATIONS  (STANDING):  acetaminophen   IVPB .. 1000 milliGRAM(s) IV Intermittent every 6 hours  albuterol/ipratropium for Nebulization 3 milliLiter(s) Nebulizer every 6 hours  budesonide  80 MICROgram(s)/formoterol 4.5 MICROgram(s) Inhaler 2 Puff(s) Inhalation two times a day  heparin   Injectable 5000 Unit(s) SubCutaneous every 12 hours  influenza  Vaccine (HIGH DOSE) 0.7 milliLiter(s) IntraMuscular once  lactated ringers. 1000 milliLiter(s) (30 mL/Hr) IV Continuous <Continuous>  phenylephrine    Infusion 0.2 MICROgram(s)/kG/Min (4.42 mL/Hr) IV Continuous <Continuous>  polyethylene glycol 3350 17 Gram(s) Oral daily  senna 2 Tablet(s) Oral at bedtime  tiotropium 2.5 MICROgram(s) Inhaler 2 Puff(s) Inhalation daily    MEDICATIONS  (PRN):  naloxone Injectable 0.1 milliGRAM(s) IV Push every 3 minutes PRN For ANY of the following changes in patient status:  A. RR LESS THAN 10 breaths per minute, B. Oxygen saturation LESS THAN 90%, C. Sedation score of 6  ondansetron Injectable 4 milliGRAM(s) IV Push every 6 hours PRN Nausea  oxyCODONE    IR 2.5 milliGRAM(s) Oral every 4 hours PRN Moderate to Severe Pain (4 - 10)        Physical exam:                             General:               Pt is awake, alert,  appears to be in pain but not in distress                                                  Neuro:                  Nonfocal                             Psych:                   A&Ox3                          Cardiovascular:   S1 & S2, regular                           Respiratory:         Air entry is fair and equal on both sides, has bilateral conducted sounds                           GI:                          Soft, nondistended and nontender, Bowel sounds active                            Ext:                        No cyanosis or edema     Labs:                                                                           12.2   15.77 )-----------( 185      ( 26 Oct 2023 03:45 )             37.7             10-26    138  |  105  |  13  ----------------------------<  127<H>  4.0   |  25  |  0.47<L>    Ca    8.6      26 Oct 2023 03:45  Phos  3.6     10-26  Mg     2.00     10-26    TPro  6.0  /  Alb  3.9  /  TBili  0.3  /  DBili  x   /  AST  14  /  ALT  16  /  AlkPhos  69  10                  PT/INR - ( 26 Oct 2023 03:45 )   PT: 11.4 sec;   INR: 1.02 ratio         PTT - ( 26 Oct 2023 03:45 )  PTT:24.8 sec  LIVER FUNCTIONS - ( 25 Oct 2023 13:45 )  Alb: 3.9 g/dL / Pro: 6.0 g/dL / ALK PHOS: 69 U/L / ALT: 16 U/L / AST: 14 U/L / GGT: x           Urinalysis Basic - ( 26 Oct 2023 03:45 )    Color: x / Appearance: x / SG: x / pH: x  Gluc: 127 mg/dL / Ketone: x  / Bili: x / Urobili: x   Blood: x / Protein: x / Nitrite: x   Leuk Esterase: x / RBC: x / WBC x   Sq Epi: x / Non Sq Epi: x / Bacteria: x        Culture - Tissue with Gram Stain (collected 25 Oct 2023 12:50)  Source: .Tissue RIGHT UPPER LOBE NODULE  Gram Stain (25 Oct 2023 23:50):    No polymorphonuclear cells seen per low power field    No organisms seen per oil power field      CXR:  < from: Xray Chest 1 View- PORTABLE-Urgent (10.25.23 @ 13:24) >  Right-sided chest tube. Mediastinal drains are present. Surgical material   seen in the mediastinum.  The heart is normal in size.  The lungs are clear.  No pleural effusion. Trace right apical pneumothorax.  No acute bony abnormality.    IMPRESSION: Status post right thoracotomy with chest tube and clear lungs.      Plan:  General: 68yFemale s/p Flexible bronchoscopy, Right uniportal VATS, Right upper lobectomy, Mediastinal lymph node dissection with nodes.25-Oct-2023, experiencing  pain with deep breathing.                             Neuro:                                         Pain control with  PCEA /  Tylenol PRN / Lidopatch. Will switch to oral meds                            Cardiovascular:                                          Telemetry (medical test) - Reviewed by me today independently. Pt is in normal sinus rhythm .                                         Continue hemodynamic monitoring to prevent decompensation.                            Respiratory:                                         Postop hypoxemia requiring O2 via nasal cannula probably due to postop pain - Wean nasal cannula for goal O2sat above 92%.                                              CXR is clear. Encourage incentive spirometry.                                                   Chest PT and frequent suctioning.                                          COPD: Continue bronchodilators, inhaled steroids, Pulmozyme and inhaled 3% saline inhalations.                                         OOB to chair & ambulate w/ assistance.                                          Continuous pulse oximetry for support & to prevent decompensation.                                         Monitor chest tube output                                         Chest tube to  water seal, may be d/cd later today                                                                                           GI                                         On puree diet, advance to DASH / diabetic / regular diet as tolerated                                         Continue G.I prophylaxis with Protonix                                          Continue Zofran / Reglan for nausea - PRN                                         Continue bowel regimen	                                                                 Renal:                                         Continue LR  30cc/hr                                         Monitor I/Os and electrolytes                                                                                        Hem/ Onc:                                         DVT prophylaxis with SQ Heparin and SCDs                                         Monitor chest tube output &  signs of bleeding.                                          Follow CBC in AM                           Infectious disease:                                            Monitor for fever / leukocytosis.                                          All surgical incision / chest tube  sites look clean                            Endocrine                                             DContinue Accu-Checks with coverage                                                  Pertinent clinical, laboratory, radiographic, hemodynamic, echocardiographic, respiratory data, microbiologic data and chart were reviewed and analyzed frequently throughout the course of the day and night.     Patient seen, examined and plan discussed with CT Surgeon Dr. Villegas / CTICU team during rounds.  OOB to chair and ambulate with physical therapy as tolerated.   Status discussed with patient and updated plan of care.   I have spent  70  minutes of critical care time with this pt between  7am and 11.59pm monitoring hemodynamic status, managing fluid resuscitation /  pressors to prevent  shock.         Devonte Cespedes MD

## 2023-10-26 NOTE — PROGRESS NOTE ADULT - SUBJECTIVE AND OBJECTIVE BOX
Anesthesia Pain Management Service    SUBJECTIVE: Pt doing well with PCEA without problems reported.    Therapy:	  [ ] IV PCA	   [ X] Epidural           [ ] s/p Spinal Opoid              [ ] Postpartum infusion	  [ ] Patient controlled regional anesthesia (PCRA)    [ ] prn Analgesics    Allergies    penicillin (Rash)  latex (Rash)    Intolerances      MEDICATIONS  (STANDING):  acetaminophen   IVPB .. 1000 milliGRAM(s) IV Intermittent every 6 hours  albuterol/ipratropium for Nebulization 3 milliLiter(s) Nebulizer every 6 hours  budesonide  80 MICROgram(s)/formoterol 4.5 MICROgram(s) Inhaler 2 Puff(s) Inhalation two times a day  heparin   Injectable 5000 Unit(s) SubCutaneous every 12 hours  influenza  Vaccine (HIGH DOSE) 0.7 milliLiter(s) IntraMuscular once  lactated ringers. 1000 milliLiter(s) (30 mL/Hr) IV Continuous <Continuous>  phenylephrine    Infusion 0.2 MICROgram(s)/kG/Min (4.42 mL/Hr) IV Continuous <Continuous>  polyethylene glycol 3350 17 Gram(s) Oral daily  senna 2 Tablet(s) Oral at bedtime  tiotropium 2.5 MICROgram(s) Inhaler 2 Puff(s) Inhalation daily    MEDICATIONS  (PRN):  naloxone Injectable 0.1 milliGRAM(s) IV Push every 3 minutes PRN For ANY of the following changes in patient status:  A. RR LESS THAN 10 breaths per minute, B. Oxygen saturation LESS THAN 90%, C. Sedation score of 6  ondansetron Injectable 4 milliGRAM(s) IV Push every 6 hours PRN Nausea  oxyCODONE    IR 2.5 milliGRAM(s) Oral every 4 hours PRN Moderate to Severe Pain (4 - 10)      OBJECTIVE:   [X] No new signs     [ ] Other:    Side Effects:  [X ] None			[ ] Other:    Assessment of Catheter Site:		[ X] Intact		[ ] Other:    ASSESSMENT/PLAN  [ X] Continue current therapy    [ ] Therapy changed to:    [ ] IV PCA       [ ] Epidural     [ ] prn Analgesics     Comments: Continue current PCEA settings.

## 2023-10-26 NOTE — PROGRESS NOTE ADULT - SUBJECTIVE AND OBJECTIVE BOX
Anesthesia Pain Management Service: PO Day _1_ of Epidural    SUBJECTIVE: Patient reports feeling nauseous and dizzy with PCEA. Denies headache, numbness and tingling in lower extremities.  Pain Scale Score: 5/10  Refer to charted pain scores    THERAPY:  [x ] Epidural Bupivacaine 0.0625% and Hydromorphone  		[X ] 10 micrograms/mL	[ ] 5 micrograms/mL  [ ] Epidural Bupivacaine 0.0625% and Fentanyl - 2 micrograms/mL  [ ] Epidural Ropivacaine 0.1% plain – 1 mg/mL  [ ] Patient Controlled Regional Anesthesia (PCRA) Ropivacaine  		[ ] 0.2%			[ ] 0.1%    Demand dose __3_ lockout __15_ (minutes) Continuous Rate _6__ Total: __115.8_ Daily      MEDICATIONS  (STANDING):  acetaminophen   IVPB .. 1000 milliGRAM(s) IV Intermittent every 6 hours  albuterol/ipratropium for Nebulization 3 milliLiter(s) Nebulizer every 6 hours  budesonide  80 MICROgram(s)/formoterol 4.5 MICROgram(s) Inhaler 2 Puff(s) Inhalation two times a day  heparin   Injectable 5000 Unit(s) SubCutaneous every 12 hours  influenza  Vaccine (HIGH DOSE) 0.7 milliLiter(s) IntraMuscular once  lactated ringers. 1000 milliLiter(s) (30 mL/Hr) IV Continuous <Continuous>  phenylephrine    Infusion 0.2 MICROgram(s)/kG/Min (4.42 mL/Hr) IV Continuous <Continuous>  polyethylene glycol 3350 17 Gram(s) Oral daily  senna 2 Tablet(s) Oral at bedtime  tiotropium 2.5 MICROgram(s) Inhaler 2 Puff(s) Inhalation daily    MEDICATIONS  (PRN):  naloxone Injectable 0.1 milliGRAM(s) IV Push every 3 minutes PRN For ANY of the following changes in patient status:  A. RR LESS THAN 10 breaths per minute, B. Oxygen saturation LESS THAN 90%, C. Sedation score of 6  ondansetron Injectable 4 milliGRAM(s) IV Push every 6 hours PRN Nausea  oxyCODONE    IR 2.5 milliGRAM(s) Oral every 4 hours PRN Moderate to Severe Pain (4 - 10)      OBJECTIVE: Patient sitting in chair, CTx1    Assessment of Catheter Site:	[ ] Left	[ ] Right  [x ] Epidural 	[ ] Femoral	      [ ] Saphenous   [ ] Supraclavicular   [ ] Other:    [x ] Dressing intact	[x ] Site non-tender	[ x] Site without erythema, discharge, edema  [x ] Epidural tubing and connection checked	[x] Gross neurological exam within normal limits  [X ] Catheter removed – tip intact		[ ] Afebrile	  [ ] Febrile: ___       [ X] see Temp under VS below)    PT/INR - ( 26 Oct 2023 03:45 )   PT: 11.4 sec;   INR: 1.02 ratio         PTT - ( 26 Oct 2023 03:45 )  PTT:24.8 sec                      12.2   15.77 )-----------( 185      ( 26 Oct 2023 03:45 )             37.7     Vital Signs Last 24 Hrs  T(C): 36.8 (10-26-23 @ 08:00), Max: 36.8 (10-26-23 @ 08:00)  T(F): 98.2 (10-26-23 @ 08:00), Max: 98.2 (10-26-23 @ 08:00)  HR: 74 (10-26-23 @ 10:30) (57 - 76)  BP: 115/62 (10-26-23 @ 10:30) (85/49 - 122/57)  BP(mean): 79 (10-26-23 @ 10:30) (58 - 83)  RR: 17 (10-26-23 @ 10:30) (10 - 20)  SpO2: 94% (10-26-23 @ 10:30) (93% - 100%)      Sedation Score:	[x ] Alert	[ ] Drowsy	[ ] Arousable	[ ] Asleep	[ ] Unresponsive    Side Effects:	[x ] None	[ ] Nausea	[ ] Vomiting	[ ] Pruritus  		[ ] Weakness		[ ] Numbness	[ ] Other:    ASSESSMENT/ PLAN:    Therapy to  be:	[ ] Continue   [ X] Discontinued   [ X] Change to prn Analgesics    Documentation and Verification of current medications:  [ X ] Done	[ ] Not done, not eligible, reason:    Comments: Changed to IV/oral opioid and/or non-opioid Adjuvant analgesics to be used at this point.    Progress Note written now but Patient was seen earlier.

## 2023-10-27 ENCOUNTER — TRANSCRIPTION ENCOUNTER (OUTPATIENT)
Age: 68
End: 2023-10-27

## 2023-10-27 LAB
ANION GAP SERPL CALC-SCNC: 5 MMOL/L — LOW (ref 7–14)
ANION GAP SERPL CALC-SCNC: 5 MMOL/L — LOW (ref 7–14)
BUN SERPL-MCNC: 9 MG/DL — SIGNIFICANT CHANGE UP (ref 7–23)
BUN SERPL-MCNC: 9 MG/DL — SIGNIFICANT CHANGE UP (ref 7–23)
CALCIUM SERPL-MCNC: 8.7 MG/DL — SIGNIFICANT CHANGE UP (ref 8.4–10.5)
CALCIUM SERPL-MCNC: 8.7 MG/DL — SIGNIFICANT CHANGE UP (ref 8.4–10.5)
CHLORIDE SERPL-SCNC: 106 MMOL/L — SIGNIFICANT CHANGE UP (ref 98–107)
CHLORIDE SERPL-SCNC: 106 MMOL/L — SIGNIFICANT CHANGE UP (ref 98–107)
CO2 SERPL-SCNC: 30 MMOL/L — SIGNIFICANT CHANGE UP (ref 22–31)
CO2 SERPL-SCNC: 30 MMOL/L — SIGNIFICANT CHANGE UP (ref 22–31)
CREAT SERPL-MCNC: 0.4 MG/DL — LOW (ref 0.5–1.3)
CREAT SERPL-MCNC: 0.4 MG/DL — LOW (ref 0.5–1.3)
EGFR: 108 ML/MIN/1.73M2 — SIGNIFICANT CHANGE UP
EGFR: 108 ML/MIN/1.73M2 — SIGNIFICANT CHANGE UP
GLUCOSE SERPL-MCNC: 100 MG/DL — HIGH (ref 70–99)
GLUCOSE SERPL-MCNC: 100 MG/DL — HIGH (ref 70–99)
HCT VFR BLD CALC: 34.6 % — SIGNIFICANT CHANGE UP (ref 34.5–45)
HCT VFR BLD CALC: 34.6 % — SIGNIFICANT CHANGE UP (ref 34.5–45)
HGB BLD-MCNC: 10.9 G/DL — LOW (ref 11.5–15.5)
HGB BLD-MCNC: 10.9 G/DL — LOW (ref 11.5–15.5)
MAGNESIUM SERPL-MCNC: 2 MG/DL — SIGNIFICANT CHANGE UP (ref 1.6–2.6)
MAGNESIUM SERPL-MCNC: 2 MG/DL — SIGNIFICANT CHANGE UP (ref 1.6–2.6)
MCHC RBC-ENTMCNC: 28.9 PG — SIGNIFICANT CHANGE UP (ref 27–34)
MCHC RBC-ENTMCNC: 28.9 PG — SIGNIFICANT CHANGE UP (ref 27–34)
MCHC RBC-ENTMCNC: 31.5 GM/DL — LOW (ref 32–36)
MCHC RBC-ENTMCNC: 31.5 GM/DL — LOW (ref 32–36)
MCV RBC AUTO: 91.8 FL — SIGNIFICANT CHANGE UP (ref 80–100)
MCV RBC AUTO: 91.8 FL — SIGNIFICANT CHANGE UP (ref 80–100)
NRBC # BLD: 0 /100 WBCS — SIGNIFICANT CHANGE UP (ref 0–0)
NRBC # BLD: 0 /100 WBCS — SIGNIFICANT CHANGE UP (ref 0–0)
NRBC # FLD: 0 K/UL — SIGNIFICANT CHANGE UP (ref 0–0)
NRBC # FLD: 0 K/UL — SIGNIFICANT CHANGE UP (ref 0–0)
PHOSPHATE SERPL-MCNC: 2.2 MG/DL — LOW (ref 2.5–4.5)
PHOSPHATE SERPL-MCNC: 2.2 MG/DL — LOW (ref 2.5–4.5)
PLATELET # BLD AUTO: 193 K/UL — SIGNIFICANT CHANGE UP (ref 150–400)
PLATELET # BLD AUTO: 193 K/UL — SIGNIFICANT CHANGE UP (ref 150–400)
POTASSIUM SERPL-MCNC: 3.9 MMOL/L — SIGNIFICANT CHANGE UP (ref 3.5–5.3)
POTASSIUM SERPL-MCNC: 3.9 MMOL/L — SIGNIFICANT CHANGE UP (ref 3.5–5.3)
POTASSIUM SERPL-SCNC: 3.9 MMOL/L — SIGNIFICANT CHANGE UP (ref 3.5–5.3)
POTASSIUM SERPL-SCNC: 3.9 MMOL/L — SIGNIFICANT CHANGE UP (ref 3.5–5.3)
RBC # BLD: 3.77 M/UL — LOW (ref 3.8–5.2)
RBC # BLD: 3.77 M/UL — LOW (ref 3.8–5.2)
RBC # FLD: 12.3 % — SIGNIFICANT CHANGE UP (ref 10.3–14.5)
RBC # FLD: 12.3 % — SIGNIFICANT CHANGE UP (ref 10.3–14.5)
SODIUM SERPL-SCNC: 141 MMOL/L — SIGNIFICANT CHANGE UP (ref 135–145)
SODIUM SERPL-SCNC: 141 MMOL/L — SIGNIFICANT CHANGE UP (ref 135–145)
WBC # BLD: 8.77 K/UL — SIGNIFICANT CHANGE UP (ref 3.8–10.5)
WBC # BLD: 8.77 K/UL — SIGNIFICANT CHANGE UP (ref 3.8–10.5)
WBC # FLD AUTO: 8.77 K/UL — SIGNIFICANT CHANGE UP (ref 3.8–10.5)
WBC # FLD AUTO: 8.77 K/UL — SIGNIFICANT CHANGE UP (ref 3.8–10.5)

## 2023-10-27 PROCEDURE — 99233 SBSQ HOSP IP/OBS HIGH 50: CPT

## 2023-10-27 PROCEDURE — 71045 X-RAY EXAM CHEST 1 VIEW: CPT | Mod: 26

## 2023-10-27 RX ORDER — ACETAMINOPHEN 500 MG
1000 TABLET ORAL ONCE
Refills: 0 | Status: COMPLETED | OUTPATIENT
Start: 2023-10-27 | End: 2023-10-27

## 2023-10-27 RX ORDER — SODIUM,POTASSIUM PHOSPHATES 278-250MG
1 POWDER IN PACKET (EA) ORAL ONCE
Refills: 0 | Status: COMPLETED | OUTPATIENT
Start: 2023-10-27 | End: 2023-10-27

## 2023-10-27 RX ORDER — SODIUM CHLORIDE 9 MG/ML
250 INJECTION, SOLUTION INTRAVENOUS ONCE
Refills: 0 | Status: COMPLETED | OUTPATIENT
Start: 2023-10-27 | End: 2023-10-27

## 2023-10-27 RX ORDER — ACETAMINOPHEN 500 MG
1000 TABLET ORAL ONCE
Refills: 0 | Status: COMPLETED | OUTPATIENT
Start: 2023-10-27 | End: 2023-10-28

## 2023-10-27 RX ADMIN — TRAMADOL HYDROCHLORIDE 50 MILLIGRAM(S): 50 TABLET ORAL at 04:30

## 2023-10-27 RX ADMIN — Medication 400 MILLIGRAM(S): at 06:30

## 2023-10-27 RX ADMIN — TRAMADOL HYDROCHLORIDE 50 MILLIGRAM(S): 50 TABLET ORAL at 08:39

## 2023-10-27 RX ADMIN — Medication 3 MILLILITER(S): at 15:52

## 2023-10-27 RX ADMIN — HEPARIN SODIUM 5000 UNIT(S): 5000 INJECTION INTRAVENOUS; SUBCUTANEOUS at 05:50

## 2023-10-27 RX ADMIN — Medication 1000 MILLIGRAM(S): at 01:00

## 2023-10-27 RX ADMIN — LIDOCAINE 1 PATCH: 4 CREAM TOPICAL at 06:30

## 2023-10-27 RX ADMIN — TRAMADOL HYDROCHLORIDE 50 MILLIGRAM(S): 50 TABLET ORAL at 11:19

## 2023-10-27 RX ADMIN — Medication 1000 MILLIGRAM(S): at 07:00

## 2023-10-27 RX ADMIN — TIOTROPIUM BROMIDE 2 PUFF(S): 18 CAPSULE ORAL; RESPIRATORY (INHALATION) at 10:40

## 2023-10-27 RX ADMIN — TRAMADOL HYDROCHLORIDE 50 MILLIGRAM(S): 50 TABLET ORAL at 22:23

## 2023-10-27 RX ADMIN — Medication 1000 MILLIGRAM(S): at 13:30

## 2023-10-27 RX ADMIN — Medication 3 MILLILITER(S): at 21:35

## 2023-10-27 RX ADMIN — Medication 3 MILLILITER(S): at 10:39

## 2023-10-27 RX ADMIN — Medication 1 TABLET(S): at 05:50

## 2023-10-27 RX ADMIN — Medication 3 MILLILITER(S): at 03:54

## 2023-10-27 RX ADMIN — SENNA PLUS 2 TABLET(S): 8.6 TABLET ORAL at 22:23

## 2023-10-27 RX ADMIN — Medication 400 MILLIGRAM(S): at 12:34

## 2023-10-27 RX ADMIN — BUDESONIDE AND FORMOTEROL FUMARATE DIHYDRATE 2 PUFF(S): 160; 4.5 AEROSOL RESPIRATORY (INHALATION) at 10:40

## 2023-10-27 RX ADMIN — LIDOCAINE 1 PATCH: 4 CREAM TOPICAL at 17:51

## 2023-10-27 RX ADMIN — POLYETHYLENE GLYCOL 3350 17 GRAM(S): 17 POWDER, FOR SOLUTION ORAL at 12:34

## 2023-10-27 RX ADMIN — ONDANSETRON 4 MILLIGRAM(S): 8 TABLET, FILM COATED ORAL at 14:04

## 2023-10-27 RX ADMIN — TRAMADOL HYDROCHLORIDE 50 MILLIGRAM(S): 50 TABLET ORAL at 23:33

## 2023-10-27 RX ADMIN — SODIUM CHLORIDE 500 MILLILITER(S): 9 INJECTION, SOLUTION INTRAVENOUS at 01:45

## 2023-10-27 RX ADMIN — LIDOCAINE 1 PATCH: 4 CREAM TOPICAL at 23:17

## 2023-10-27 RX ADMIN — Medication 400 MILLIGRAM(S): at 00:30

## 2023-10-27 RX ADMIN — TRAMADOL HYDROCHLORIDE 50 MILLIGRAM(S): 50 TABLET ORAL at 16:16

## 2023-10-27 RX ADMIN — HEPARIN SODIUM 5000 UNIT(S): 5000 INJECTION INTRAVENOUS; SUBCUTANEOUS at 18:59

## 2023-10-27 RX ADMIN — LIDOCAINE 1 PATCH: 4 CREAM TOPICAL at 11:02

## 2023-10-27 NOTE — DISCHARGE NOTE NURSING/CASE MANAGEMENT/SOCIAL WORK - NSSCNAMETXT_GEN_ALL_CORE
[facility] [phone] initial visit will be day after discharge home. A nurse will call prior to the home visit.

## 2023-10-27 NOTE — DISCHARGE NOTE PROVIDER - NSDCCPCAREPLAN_GEN_ALL_CORE_FT
PRINCIPAL DISCHARGE DIAGNOSIS  Diagnosis: Lung nodule  Assessment and Plan of Treatment:      PRINCIPAL DISCHARGE DIAGNOSIS  Diagnosis: Lung nodule  Assessment and Plan of Treatment: - Follow up with Dr. Villegas in 1-2 weeks [phone]. Please call the office to make an appointment.   - Have your Chest x-ray done 1-2 days prior to your appointment.    - Please bring copy of x-ray to your appointment.  - Follow up with primary care provider in one week.  - Take the prescribed pain medication ONLY as needed.  - Can use over the counter Ibuprofen & or Tylenol as directed on the bottle.   - Please take a laxative or stool softeners to help support your bowel movements while on narcotic pain medication as it can cause constipation. Laxatives & stool softeners can be available over the counter at your local pharmacy.

## 2023-10-27 NOTE — DIETITIAN INITIAL EVALUATION ADULT - NSFNSGIIOFT_GEN_A_CORE
10-26-23 @ 07:01  -  10-27-23 @ 07:00  --------------------------------------------------------  OUT:    Chest Tube (mL): 0 mL  Total OUT: 0 mL    Total NET: 0 mL

## 2023-10-27 NOTE — DISCHARGE NOTE PROVIDER - NSDCCPTREATMENT_GEN_ALL_CORE_FT
PRINCIPAL PROCEDURE  Procedure: VATS, with lobectomy  Findings and Treatment: Right uniprotal VATS, right upper lobectomy with mediastinal lymph node dissection

## 2023-10-27 NOTE — DIETITIAN INITIAL EVALUATION ADULT - CALCULATED FROM (G/KG)
64.79 Rifampin Counseling: I discussed with the patient the risks of rifampin including but not limited to liver damage, kidney damage, red-orange body fluids, nausea/vomiting and severe allergy.

## 2023-10-27 NOTE — DISCHARGE NOTE NURSING/CASE MANAGEMENT/SOCIAL WORK - PATIENT PORTAL LINK FT
You can access the FollowMyHealth Patient Portal offered by [facility] by registering at the following website: [email or web address]. By joining [facility]’s FollowMyHealth portal, you will also be able to view your health information using other applications (apps) compatible with our system.

## 2023-10-27 NOTE — DISCHARGE NOTE PROVIDER - CARE PROVIDER_API CALL
Nikita Villegas  Thoracic Surgery  [phone] [place] [address] [place] [address]  Phone: [phone]  Fax: [phone]  Follow Up Time:

## 2023-10-27 NOTE — DISCHARGE NOTE PROVIDER - NSDCMRMEDTOKEN_GEN_ALL_CORE_FT
Astelin 137 mcg/inh nasal spray: 2 spray(s) intranasally once a day as needed  Levaquin 750 mg oral tablet: 1 tab(s) orally once a day LD 10/6/2023  Treleclyde Ellipta 100 mcg-62.5 mcg-25 mcg/inh inhalation powder: 1 puff(s) inhaled once a day   acetaminophen 325 mg oral tablet: 2 tab(s) orally every 6 hours As needed Mild Pain (1 - 3)  Astelin 137 mcg/inh nasal spray: 2 spray(s) intranasally once a day as needed  Chest Xray AP/PA: Please use this prescription should you choose to go to an outpatient imaging center of your choice to have your follow up chest Xray completed. Please know that should you go to an imaging center of your choice, your chest Xray should not be completed any earlier than two days prior to the date of your follow up appointment.    Please fax results to: Dr. Villegas @ [phone]  ICD: R91.8  Levaquin 750 mg oral tablet: 1 tab(s) orally once a day LD 10/6/2023  phenazopyridine 200 mg oral tablet: 1 tab(s) orally 3 times a day (after meals)  polyethylene glycol 3350 oral powder for reconstitution: 17 gram(s) orally once a day  senna leaf extract oral tablet: 2 tab(s) orally once a day (at bedtime)  Trelegy Ellipta 100 mcg-62.5 mcg-25 mcg/inh inhalation powder: 1 puff(s) inhaled once a day  Ultram 50 mg oral tablet: 1 tab(s) orally every 6 hours as needed for Moderate to Severe Pain MDD: 4 tabs

## 2023-10-27 NOTE — DISCHARGE NOTE PROVIDER - CARE PROVIDERS DIRECT ADDRESSES
[email or web address]

## 2023-10-27 NOTE — DIETITIAN INITIAL EVALUATION ADULT - PERTINENT MEDS FT
MEDICATIONS  (STANDING):  acetaminophen   IVPB .. 1000 milliGRAM(s) IV Intermittent once  albuterol/ipratropium for Nebulization 3 milliLiter(s) Nebulizer every 6 hours  budesonide  80 MICROgram(s)/formoterol 4.5 MICROgram(s) Inhaler 2 Puff(s) Inhalation two times a day  heparin   Injectable 5000 Unit(s) SubCutaneous every 12 hours  influenza  Vaccine (HIGH DOSE) 0.7 milliLiter(s) IntraMuscular once  lidocaine   4% Patch 1 Patch Transdermal daily  phenylephrine    Infusion 0.2 MICROgram(s)/kG/Min (4.42 mL/Hr) IV Continuous <Continuous>  polyethylene glycol 3350 17 Gram(s) Oral daily  senna 2 Tablet(s) Oral at bedtime  tiotropium 2.5 MICROgram(s) Inhaler 2 Puff(s) Inhalation daily    MEDICATIONS  (PRN):  naloxone Injectable 0.1 milliGRAM(s) IV Push every 3 minutes PRN For ANY of the following changes in patient status:  A. RR LESS THAN 10 breaths per minute, B. Oxygen saturation LESS THAN 90%, C. Sedation score of 6  ondansetron Injectable 4 milliGRAM(s) IV Push every 6 hours PRN Nausea  traMADol 25 milliGRAM(s) Oral every 4 hours PRN Moderate Pain (4 - 6)  traMADol 50 milliGRAM(s) Oral every 4 hours PRN Severe Pain (7 - 10)
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
## 2023-10-27 NOTE — DISCHARGE NOTE PROVIDER - HOSPITAL COURSE
67 yo female with h/o COPD and lung cancer s/p left lung resection in 5/2022 - was discharged on home O2 for about 1 month.  Pt now has right lung nodule that is enlarging. Pt s/p flex bronch, Right uniportal Vats, right upper lobectomy, MLND on 10/25/23.  Post op course patient requiring oxygen.  Patient stable for discharge home when home oxygen arranged. 69 yo female with h/o COPD and lung cancer s/p left lung resection in 5/2022 - was discharged on home O2 for about 1 month.  Pt now has right lung nodule that is enlarging. Pt s/p flex bronch, Right uniportal Vats, right upper lobectomy, MLND on 10/25/23.  Post op course patient requiring oxygen.  Patient stable for discharge home when home oxygen arranged.      At this point in time patient is stable for discharge to home per patients attending. Patient will follow up with Dr. Villegas within 1-2 weeks.

## 2023-10-27 NOTE — PROGRESS NOTE ADULT - SUBJECTIVE AND OBJECTIVE BOX
CHIEF COMPLAINT: FOLLOW UP IN ICU FOR POSTOPERATIVE CARE OF PATIENT WHO IS S/P RUL lobectomy      PROCEDURES:       Flexible bronchoscopy, Right uniportal VATS, Right upper lobectomy, Mediastinal lymph node dissection with nodes.25-Oct-2023      ISSUES:                 Lung nodule              Postop pain              Chest tube in place              Postoperative hypotension requiring IV pressors  COPD, severe  H/o TRISTAN lobectomy in 05/2022  Hx of kidney stones s/p R ureter stent (5/2018)   Hx of shingles              Former smoker      INTERVAL EVENTS:     Off pressors, chest tube removed yesterday  Needs O2 to maintain spo2>92%, pending home 02 arrangement  Epidural catheter and newell removed yesterday, voiding.    HISTORY:   Patient reports moderate pain at chest wall incision sites which is worse with coughing and deep breathing without associated fever or dyspnea. Pain is improved with use of pain meds.     PHYSICAL EXAM:   Gen: Comfortable, No acute distress  Eyes: Sclera white, Conjunctiva normal, Eyelids normal, Pupils symmetrical   ENT: Mucous membranes moist,  ,  ,    Neck: Trachea midline,  ,  ,  ,  ,  ,    CV: Rate regular, Rhythm regular,  ,  ,    Resp: Breath sounds clear, No accessory muscles use,   Abd: Soft, Non-distended, Non-tender, Bowel sounds normal,  ,  ,    Skin: Warm, No peripheral edema of lower extremities,  ,    :No newell  Neuro: Moving all 4 extremities,    Psych: A&Ox3      ASSESSMENT AND PLAN:     NEURO:  Post-operative Pain - Stable. Pain control with Tramadol and Tylenol IV PRN.           RESPIRATORY:  Hypoxia - Wean nasal cannula for goal O2sat above 92. Obtain CXR. Incentive spirometry. Chest PT and frequent suctioning. Continue bronchodilators. OOB to chair & ambulate w/ assistance. Continuous pulse oximetry for support & to prevent decompensation.     Chest tube removed        COPD - worsened after surgery                 CARDIOVASCULAR:  Hemodynamically unstable, postoperative hypotension -Phenylephrine being actively titrated to keep MAP>65. Check ABG. Continue hemodynamic monitoring.  Telemetry (medical test) - Reviewed by me today independently. Normal sinus rhythm.               RENAL:  Stable - Monitor IOs and electrolytes. Keep K above 4.0 and Mg above 2.0. Lasix 10mg iv x 1 this afternoon if MAPs remain >65 off pressors            GASTROINTESTINAL:  _________________________  VITAL SIGNS:  Vital Signs Last 24 Hrs  T(C): 36.8 (27 Oct 2023 08:00), Max: 36.8 (27 Oct 2023 08:00)  T(F): 98.2 (27 Oct 2023 08:00), Max: 98.2 (27 Oct 2023 08:00)  HR: 84 (27 Oct 2023 09:25) (60 - 100)  BP: 93/55 (27 Oct 2023 09:00) (84/49 - 115/64)  BP(mean): 68 (27 Oct 2023 09:00) (61 - 84)  RR: 19 (27 Oct 2023 09:25) (10 - 28)  SpO2: 83% (27 Oct 2023 09:25) (83% - 100%)    Parameters below as of 27 Oct 2023 09:25  Patient On (Oxygen Delivery Method): room air      I/Os:   I&O's Detail    26 Oct 2023 07:01  -  27 Oct 2023 07:00  --------------------------------------------------------  IN:    IV PiggyBack: 300 mL    Lactated Ringers: 720 mL    Lactated Ringers Bolus: 1000 mL    Oral Fluid: 600 mL    Phenylephrine: 132.8 mL  Total IN: 2752.8 mL    OUT:    Chest Tube (mL): 0 mL    Indwelling Catheter - Urethral (mL): 1090 mL    Voided (mL): 350 mL  Total OUT: 1440 mL    Total NET: 1312.8 mL      27 Oct 2023 07:01  -  27 Oct 2023 09:33  --------------------------------------------------------  IN:  Total IN: 0 mL    OUT:    Voided (mL): 100 mL  Total OUT: 100 mL    Total NET: -100 mL              MEDICATIONS:  MEDICATIONS  (STANDING):  acetaminophen   IVPB .. 1000 milliGRAM(s) IV Intermittent once  albuterol/ipratropium for Nebulization 3 milliLiter(s) Nebulizer every 6 hours  budesonide  80 MICROgram(s)/formoterol 4.5 MICROgram(s) Inhaler 2 Puff(s) Inhalation two times a day  heparin   Injectable 5000 Unit(s) SubCutaneous every 12 hours  influenza  Vaccine (HIGH DOSE) 0.7 milliLiter(s) IntraMuscular once  lidocaine   4% Patch 1 Patch Transdermal daily  phenylephrine    Infusion 0.2 MICROgram(s)/kG/Min (4.42 mL/Hr) IV Continuous <Continuous>  polyethylene glycol 3350 17 Gram(s) Oral daily  senna 2 Tablet(s) Oral at bedtime  tiotropium 2.5 MICROgram(s) Inhaler 2 Puff(s) Inhalation daily    MEDICATIONS  (PRN):  naloxone Injectable 0.1 milliGRAM(s) IV Push every 3 minutes PRN For ANY of the following changes in patient status:  A. RR LESS THAN 10 breaths per minute, B. Oxygen saturation LESS THAN 90%, C. Sedation score of 6  ondansetron Injectable 4 milliGRAM(s) IV Push every 6 hours PRN Nausea  traMADol 25 milliGRAM(s) Oral every 4 hours PRN Moderate Pain (4 - 6)  traMADol 50 milliGRAM(s) Oral every 4 hours PRN Severe Pain (7 - 10)      LABS:  Laboratory data was independently reviewed by me today.                           10.9   8.77  )-----------( 193      ( 27 Oct 2023 04:05 )             34.6     10-27    141  |  106  |  9   ----------------------------<  100<H>  3.9   |  30  |  0.40<L>    Ca    8.7      27 Oct 2023 04:05  Phos  2.2     10-27  Mg     2.00     10-27    TPro  6.0  /  Alb  3.9  /  TBili  0.3  /  DBili  x   /  AST  14  /  ALT  16  /  AlkPhos  69  10-25    LIVER FUNCTIONS - ( 25 Oct 2023 13:45 )  Alb: 3.9 g/dL / Pro: 6.0 g/dL / ALK PHOS: 69 U/L / ALT: 16 U/L / AST: 14 U/L / GGT: x           PT/INR - ( 26 Oct 2023 03:45 )   PT: 11.4 sec;   INR: 1.02 ratio         PTT - ( 26 Oct 2023 03:45 )  PTT:24.8 sec  ABG - ( 25 Oct 2023 14:15 )  pH, Arterial: 7.32  pH, Blood: x     /  pCO2: 52    /  pO2: 159   / HCO3: 27    / Base Excess: -0.2  /  SaO2: 98.6              Urinalysis Basic - ( 27 Oct 2023 04:05 )    Color: x / Appearance: x / SG: x / pH: x  Gluc: 100 mg/dL / Ketone: x  / Bili: x / Urobili: x   Blood: x / Protein: x / Nitrite: x   Leuk Esterase: x / RBC: x / WBC x   Sq Epi: x / Non Sq Epi: x / Bacteria: x        RADIOLOGY:   Radiology images were independently reviewed by me today. Reports were reviewed by me today.    Xray Chest 1 View- PORTABLE-Urgent:   ACC: [id number] EXAM:  XR CHEST PORTABLE ROUTINE 1V   ORDERED BY: CHET MORALES     ACC: [id number] EXAM:  XR CHEST PORTABLE URGENT 1V   ORDERED BY: ELENO MCMAHON     PROCEDURE DATE:  10/26/2023          INTERPRETATION:  CLINICAL INFORMATION: Follow-up studies pre and post   chest tube removal.    TIME OF EXAMINATION: October 26 at 5:52 AM and 10:31 AM    EXAM: Portable chest    FINDINGS:  5:52 AM:  Right-sided chest tube in place. Lungs are well expanded and free of   pneumothoraces or focal consolidations.        10:31 AM:  Right chest tube has been removed. Tiny apical pneumothorax. Lungs are   clear and the heart is not enlarged.        COMPARISON: October 25        IMPRESSION: Follow-up studies pre and post chest tube removal.    --- End of Report ---            EDIL RAMIRES MD; Attending Radiologist  This document has been electronically signed. Oct 26 2023 11:58AM (10-26-23 @ 10:38)  Xray Chest 1 View- PORTABLE-Routine:   ACC: [id number] EXAM:  XR CHEST PORTABLE ROUTINE 1V   ORDERED BY: CHET MORALES     ACC: [id number] EXAM:  XR CHEST PORTABLE URGENT 1V   ORDERED BY: ELENO MCMAHON     PROCEDURE DATE:  10/26/2023          INTERPRETATION:  CLINICAL INFORMATION: Follow-up studies pre and post   chest tube removal.    TIME OF EXAMINATION: October 26 at 5:52 AM and 10:31 AM    EXAM: Portable chest    FINDINGS:  5:52 AM:  Right-sided chest tube in place. Lungs are well expanded and free of   pneumothoraces or focal consolidations.        10:31 AM:  Right chest tube has been removed. Tiny apical pneumothorax. Lungs are   clear and the heart is not enlarged.        COMPARISON: October 25        IMPRESSION: Follow-up studies pre and post chest tube removal.    --- End of Report ---            EDIL RAMIRES MD; Attending Radiologist  This document has been electronically signed. Oct 26 2023 11:58AM (10-26-23 @ 06:30)  Xray Chest 1 View- PORTABLE-Urgent:   ACC: [id number] EXAM:  XR CHEST PORTABLE URGENT 1V   ORDERED BY: SAYRA WATSON     PROCEDURE DATE:  10/25/2023          INTERPRETATION:  EXAMINATION: XR CHEST URGENT    CLINICAL INDICATION: s/p right vats lobectomy    TECHNIQUE: Single frontal, portable view of the chest was obtained.    COMPARISON: Chest x-ray None.    FINDINGS:  Right-sided chest tube. Mediastinal drains are present. Surgical material   seen in the mediastinum.  The heart is normal in size.  The lungs are clear.  No pleural effusion. Trace right apical pneumothorax.  No acute bony abnormality.    IMPRESSION: Status post right thoracotomy with chest tube and clear lungs.      --- End of Report ---          ANTONIA CLARKE DO; Resident Radiologist  This document has beenelectronically signed.  EDIL RAMIRES MD; Attending Radiologist  This document has been electronically signed. Oct 25 2023  2:06PM (10-25-23 @ 13:24)  GI prophylaxis not indicated  Zofran and Reglan IV PRN for nausea  Regular consistency diet              HEMATOLOGIC:  No signs of active bleeding. Monitor Hgb in CBC in AM  DVT prophylaxis with heparin subQ and SCDs.               INFECTIOUS DISEASE:  All surgical sites appear clean. No signs of active infection. Will monitor for fever and leukocytosis.           ENDOCRINE:  Stable – Monitor glucose fingersticks for goal 120-180.           ONCOLOGY:  Lung nodule - Improved. S/P resection. Follow up final pathology.      Pertinent clinical, laboratory, radiographic, hemodynamic, echocardiographic, respiratory data, microbiologic data and chart were reviewed by myself and analyzed frequently throughout the course of the day and night by myself.    Plan discussed at length with the CTICU staff and Attending CT Surgeon -   Dr. Nikita Villegas    Patient's status was discussed with patient at bedside.

## 2023-10-27 NOTE — DIETITIAN INITIAL EVALUATION ADULT - PERTINENT LABORATORY DATA
10-27    141  |  106  |  9   ----------------------------<  100<H>  3.9   |  30  |  0.40<L>    Ca    8.7      27 Oct 2023 04:05  Phos  2.2     10-27  Mg     2.00     10-27    TPro  6.0  /  Alb  3.9  /  TBili  0.3  /  DBili  x   /  AST  14  /  ALT  16  /  AlkPhos  69  10-25  POCT Blood Glucose.: 133 mg/dL (10-26-23 @ 16:53)

## 2023-10-27 NOTE — DISCHARGE NOTE PROVIDER - NSDCFUADDAPPT_GEN_ALL_CORE_FT
Follow up with Dr. Villegas in 2 weeks   Chest x-ray prior to appointment with Dr. Villegas.   Follow up with primary care provider in one week

## 2023-10-27 NOTE — DISCHARGE NOTE PROVIDER - NSDCFUADDINST_GEN_ALL_CORE_FT
Please walk 4-5 x per day; increase as tolerated. You may climb stairs. Continue to use the incentive spirometer.   You may keep wounds uncovered. Please shower daily with soap and water. The suture will be removed in the office at the follow up appointment.   Please call the office at [phone] if you have fevers, chills, worsening shortness of breath, chest pain, warmth, redness or purulent discharge from the wound.

## 2023-10-27 NOTE — DIETITIAN INITIAL EVALUATION ADULT - NSICDXPASTMEDICALHX_GEN_ALL_CORE_FT
Detail Level: Detailed
PAST MEDICAL HISTORY:  Basal cell carcinoma     Emphysema lung     Former smoker     History of RSV infection     Lung cancer     Pulmonary nodule     Renal calculi     Shingles

## 2023-10-27 NOTE — DIETITIAN INITIAL EVALUATION ADULT - OTHER INFO
PROCEDURES:   Flexible bronchoscopy, Right uniportal VATS, Right upper lobectomy, Mediastinal lymph node dissection with nodes.    Pt reports PO intakes improving very slowly, noted most meals still poor (0-25%). Pt endorses post op pain was hindering po intakes. Pt is also reluctant to eat Pt's nausea is reported to be resolved. No BM since sx, is passing flatus.  PROCEDURES:   Flexible bronchoscopy, Right uniportal VATS, Right upper lobectomy, Mediastinal lymph node dissection with nodes.    Pt reports PO intakes improving very slowly, noted most meals still poor (0-25%). Pt endorses post op pain was hindering po intakes. Pt is also reluctant to eat certain menu items that are considered "too heavy". Reviewed alternate selections that are available and was accepted. Would also like to try Ensure strawberry shake once a day. Pt's nausea is reported to be resolved. No BM since sx, is passing flatus.

## 2023-10-28 VITALS
HEART RATE: 97 BPM | SYSTOLIC BLOOD PRESSURE: 106 MMHG | DIASTOLIC BLOOD PRESSURE: 64 MMHG | TEMPERATURE: 98 F | OXYGEN SATURATION: 97 % | RESPIRATION RATE: 18 BRPM

## 2023-10-28 PROCEDURE — 99231 SBSQ HOSP IP/OBS SF/LOW 25: CPT

## 2023-10-28 PROCEDURE — 71045 X-RAY EXAM CHEST 1 VIEW: CPT | Mod: 26

## 2023-10-28 RX ORDER — SENNA PLUS 8.6 MG/1
2 TABLET ORAL
Qty: 0 | Refills: 0 | DISCHARGE
Start: 2023-10-28

## 2023-10-28 RX ORDER — POLYETHYLENE GLYCOL 3350 17 G/17G
17 POWDER, FOR SOLUTION ORAL
Qty: 0 | Refills: 0 | DISCHARGE
Start: 2023-10-28

## 2023-10-28 RX ORDER — ACETAMINOPHEN 500 MG
2 TABLET ORAL
Qty: 0 | Refills: 0 | DISCHARGE
Start: 2023-10-28

## 2023-10-28 RX ORDER — TRAMADOL HYDROCHLORIDE 50 MG/1
1 TABLET ORAL
Qty: 20 | Refills: 0
Start: 2023-10-28 | End: 2023-11-01

## 2023-10-28 RX ORDER — ACETAMINOPHEN 500 MG
650 TABLET ORAL EVERY 6 HOURS
Refills: 0 | Status: DISCONTINUED | OUTPATIENT
Start: 2023-10-28 | End: 2023-10-28

## 2023-10-28 RX ADMIN — Medication 1000 MILLIGRAM(S): at 00:33

## 2023-10-28 RX ADMIN — BUDESONIDE AND FORMOTEROL FUMARATE DIHYDRATE 2 PUFF(S): 160; 4.5 AEROSOL RESPIRATORY (INHALATION) at 07:18

## 2023-10-28 RX ADMIN — TRAMADOL HYDROCHLORIDE 50 MILLIGRAM(S): 50 TABLET ORAL at 07:39

## 2023-10-28 RX ADMIN — BUDESONIDE AND FORMOTEROL FUMARATE DIHYDRATE 2 PUFF(S): 160; 4.5 AEROSOL RESPIRATORY (INHALATION) at 18:25

## 2023-10-28 RX ADMIN — LIDOCAINE 1 PATCH: 4 CREAM TOPICAL at 12:01

## 2023-10-28 RX ADMIN — TIOTROPIUM BROMIDE 2 PUFF(S): 18 CAPSULE ORAL; RESPIRATORY (INHALATION) at 12:01

## 2023-10-28 RX ADMIN — TRAMADOL HYDROCHLORIDE 50 MILLIGRAM(S): 50 TABLET ORAL at 18:25

## 2023-10-28 RX ADMIN — Medication 650 MILLIGRAM(S): at 06:19

## 2023-10-28 RX ADMIN — Medication 650 MILLIGRAM(S): at 06:40

## 2023-10-28 RX ADMIN — TRAMADOL HYDROCHLORIDE 50 MILLIGRAM(S): 50 TABLET ORAL at 12:34

## 2023-10-28 RX ADMIN — Medication 3 MILLILITER(S): at 10:40

## 2023-10-28 RX ADMIN — Medication 400 MILLIGRAM(S): at 00:07

## 2023-10-28 RX ADMIN — HEPARIN SODIUM 5000 UNIT(S): 5000 INJECTION INTRAVENOUS; SUBCUTANEOUS at 05:53

## 2023-10-28 RX ADMIN — Medication 650 MILLIGRAM(S): at 15:08

## 2023-10-28 RX ADMIN — TRAMADOL HYDROCHLORIDE 50 MILLIGRAM(S): 50 TABLET ORAL at 08:15

## 2023-10-28 RX ADMIN — Medication 3 MILLILITER(S): at 03:30

## 2023-10-28 RX ADMIN — HEPARIN SODIUM 5000 UNIT(S): 5000 INJECTION INTRAVENOUS; SUBCUTANEOUS at 17:11

## 2023-10-28 RX ADMIN — TRAMADOL HYDROCHLORIDE 50 MILLIGRAM(S): 50 TABLET ORAL at 12:00

## 2023-10-28 RX ADMIN — Medication 3 MILLILITER(S): at 16:23

## 2023-10-28 RX ADMIN — Medication 650 MILLIGRAM(S): at 14:27

## 2023-10-28 NOTE — PROGRESS NOTE ADULT - ASSESSMENT
Assessment: 69 yo female with h/o COPD and lung cancer s/p left lung resection in 5/2022, now has right lung nodule that is enlarging. Pt now s/p FB, RVATs, RUL lobectomy, MLND on 10/25/23.    Plan  - F/u CM re: delivery of home O2 equipment  - Pain control  - Monitor resp status  - Diet: Reg  - DVT PPx: Hep Subq  - Dispo: Home    Thoracic Surgery  [id number]

## 2023-10-28 NOTE — PROGRESS NOTE ADULT - SUBJECTIVE AND OBJECTIVE BOX
Thoracic Surgery Progress Note  Patient is a 68y old  Female who presents with a chief complaint of Malignant neoplasm of bronchus or lung    SUBJECTIVE: Patient seen and examined at bedside with surgical team, patient without complaints. Pt informed home O2 equipment will be delivered today, but pt strongly endorsing discomfort regarding leaving with portable O2 concentrator w/o confirmation that home O2 concentrator has been delivered. Equipment use and safety explained to pt at bedside by CM, pt still in disagreement w/ leaving w/o confirmation of delivery of home O2 equipment. Pt did not endorse chest pain, SOB, fevers/chills, N/V.    Vital Signs Last 24 Hrs  T(C): 36.6 (28 Oct 2023 12:07), Max: 36.7 (28 Oct 2023 05:00)  T(F): 97.9 (28 Oct 2023 12:07), Max: 98 (28 Oct 2023 05:00)  HR: 110 (28 Oct 2023 12:07) (82 - 120)  BP: 114/67 (28 Oct 2023 12:07) (96/61 - 133/67)  BP(mean): 87 (27 Oct 2023 16:00) (70 - 87)  RR: 18 (28 Oct 2023 12:07) (15 - 21)  SpO2: 98% (28 Oct 2023 12:07) (79% - 100%)    Parameters below as of 28 Oct 2023 12:07  Patient On (Oxygen Delivery Method): room air    I&O's Detail    27 Oct 2023 07:01  -  28 Oct 2023 07:00  --------------------------------------------------------  IN:    Oral Fluid: 360 mL  Total IN: 360 mL    OUT:    Voided (mL): 1300 mL  Total OUT: 1300 mL    Total NET: -940 mL      28 Oct 2023 07:01  -  28 Oct 2023 13:23  --------------------------------------------------------  IN:  Total IN: 0 mL    OUT:    Voided (mL): 450 mL  Total OUT: 450 mL    Total NET: -450 mL        Medications  MEDICATIONS  (STANDING):  acetaminophen   IVPB .. 1000 milliGRAM(s) IV Intermittent once  albuterol/ipratropium for Nebulization 3 milliLiter(s) Nebulizer every 6 hours  budesonide  80 MICROgram(s)/formoterol 4.5 MICROgram(s) Inhaler 2 Puff(s) Inhalation two times a day  heparin   Injectable 5000 Unit(s) SubCutaneous every 12 hours  influenza  Vaccine (HIGH DOSE) 0.7 milliLiter(s) IntraMuscular once  lidocaine   4% Patch 1 Patch Transdermal daily  polyethylene glycol 3350 17 Gram(s) Oral daily  senna 2 Tablet(s) Oral at bedtime  tiotropium 2.5 MICROgram(s) Inhaler 2 Puff(s) Inhalation daily    MEDICATIONS  (PRN):  acetaminophen     Tablet .. 650 milliGRAM(s) Oral every 6 hours PRN Mild Pain (1 - 3)  naloxone Injectable 0.1 milliGRAM(s) IV Push every 3 minutes PRN For ANY of the following changes in patient status:  A. RR LESS THAN 10 breaths per minute, B. Oxygen saturation LESS THAN 90%, C. Sedation score of 6  ondansetron Injectable 4 milliGRAM(s) IV Push every 6 hours PRN Nausea  traMADol 25 milliGRAM(s) Oral every 4 hours PRN Moderate Pain (4 - 6)  traMADol 50 milliGRAM(s) Oral every 4 hours PRN Severe Pain (7 - 10)      Physical Exam  Constitutional: A&Ox3, NAD  Eyes: Scleras clear, PERRLA/ EOMI, Gross vision intact  Respiratory: Breathing comfortably on 2L NC, symmetrical chest rise. No resp accessory muscle use or sign of resp distress.  Cardiac: S1, S2  Gastrointestinal: Soft nontender, nondistended  Extremities: Moving all extremities, no edema    LABS:                        10.9   8.77  )-----------( 193      ( 27 Oct 2023 04:05 )             34.6     10-27    141  |  106  |  9   ----------------------------<  100<H>  3.9   |  30  |  0.40<L>    Ca    8.7      27 Oct 2023 04:05  Phos  2.2     10-27  Mg     2.00     10-27          Urinalysis Basic - ( 27 Oct 2023 04:05 )    Color: x / Appearance: x / SG: x / pH: x  Gluc: 100 mg/dL / Ketone: x  / Bili: x / Urobili: x   Blood: x / Protein: x / Nitrite: x   Leuk Esterase: x / RBC: x / WBC x   Sq Epi: x / Non Sq Epi: x / Bacteria: x

## 2023-10-28 NOTE — PROGRESS NOTE ADULT - PROVIDER SPECIALTY LIST ADULT
Critical Care
Pain Medicine
Pain Medicine
Critical Care
Pain Medicine
Critical Care
Thoracic Surgery

## 2023-11-06 LAB
SURGICAL PATHOLOGY STUDY: SIGNIFICANT CHANGE UP
SURGICAL PATHOLOGY STUDY: SIGNIFICANT CHANGE UP

## 2023-11-14 ENCOUNTER — APPOINTMENT (OUTPATIENT)
Dept: THORACIC SURGERY | Facility: CLINIC | Age: 68
End: 2023-11-14
Payer: MEDICARE

## 2023-11-14 VITALS
OXYGEN SATURATION: 97 % | WEIGHT: 130 LBS | HEIGHT: 64 IN | SYSTOLIC BLOOD PRESSURE: 109 MMHG | DIASTOLIC BLOOD PRESSURE: 78 MMHG | HEART RATE: 113 BPM | BODY MASS INDEX: 22.2 KG/M2 | RESPIRATION RATE: 17 BRPM

## 2023-11-14 DIAGNOSIS — J84.89 OTHER SPECIFIED INTERSTITIAL PULMONARY DISEASES: ICD-10-CM

## 2023-11-14 PROCEDURE — 99213 OFFICE O/P EST LOW 20 MIN: CPT

## 2023-12-07 NOTE — ASU PATIENT PROFILE, ADULT - ABILITY TO HEAR (WITH HEARING AID OR HEARING APPLIANCE IF NORMALLY USED):
Past Surgical History:   Procedure Laterality Date    AORTIC VALVULOPLASTY      CARDIAC DEFIBRILLATOR PLACEMENT Left 05/09/2022    [employer] CRT-D  (Dr. Fernando Nazario)    [address] [facility] REMOVAL      HERNIA REPAIR Right 01/05/2023    RIGHT FEMORAL HERNIA  REPAIR WITH MESH performed by Adriana Peace MD at [address] [place] [address] GASTROINTESTINAL ENDOSCOPY         Family Medical History:  Family History   Problem Relation Age of Onset    Cancer Father         Colon    Cancer Sister         pancreatic, thyroid    Cancer Brother         bone, kidney, bladder       Social History  Patient lives at home with her . Employment: retired  Illicit drug use- denies  TOBACCO:   reports that she quit smoking about 23 years ago. Her smoking use included cigarettes. She has a 12.50 pack-year smoking history. She has never used smokeless tobacco.  ETOH:   reports no history of alcohol use. Home Medications  Prior to Admission medications    Medication Sig Start Date End Date Taking?  Authorizing Provider   metoprolol succinate (TOPROL XL) 25 MG extended release tablet Take 1 tablet by mouth 2 times daily (with meals) 5/22/23   Ritu Ortega MD   torsemide (DEMADEX) 20 MG tablet Take 1 tablet by mouth daily 5/12/22   Ritu Ortega MD   potassium chloride (KLOR-CON M) 10 MEQ extended release tablet Take 1 tablet by mouth daily 5/11/22   Ritu Ortega MD   levothyroxine (SYNTHROID) 112 MCG tablet Take 1 tablet by mouth Daily    ProviderJr MD   digoxin 62.5 MCG TABS Take 62.5 mcg by mouth See Admin Instructions Take the medication: Monday, Wednesday and Friday 12/6/21 12/6/23  Gordon Reese MD   folic acid (FOLVITE) 1 MG tablet Take 1 tablet by mouth daily    Jr Johnson MD   vitamin B-12 (CYANOCOBALAMIN) 500 MCG tablet Take 1 tablet by mouth daily    Jr Johnson MD   b complex vitamins capsule Take 1 Adequate: hears normal conversation without difficulty Coagulopathy ([address]) [D68.9]     Atrial fibrillation ([address]) [I48.91]     Hypertension [I10]     Hypothyroid [E03.9]     Valvular heart disease [I38]        Plan  CHF:   Telemetry  Cardiology consult-- discussed on 12/7  Defer need for echo to cardio  Follow I&O's  IV diuresis  BNP noted    Supratherapeutic INR:  Coumadin on hold (h/o afib)--pharmacy to dose when appropriate   Follow INR-5.9 today  Vitamin K given in the ED    Afib:  Coumadin as above  Continue digoxin-- Dig level 1.1  BB    Hypokalemia:  Monitor and replace as needed    PT/OT  Continue home medications. Follow labs  Please see orders for further management and care.  for discharge planning  Discharge plan: home once medically stable-- possibly tomorrow    Nicolás Lam DO  12/7/2023  5:38 AM    I can be reached through [employer]. NOTE:  This report was transcribed using voice recognition software. Every effort was made to ensure accuracy; however, inadvertent computerized transcription errors may be present.

## 2023-12-12 NOTE — PATIENT PROFILE ADULT - MONEY FOR FOOD
CHIEF COMPLAINT  Chief Complaint   Patient presents with    Dog Bite     Patient reports breaking up a dog fight at home. Patient has laceration to left wrist and multiple bites to right digits. Bleeding controlled at this time.        LIMITATION TO HISTORY   Select: None    HPI    Young Oquendo is a 31 y.o. male who presents to the Emergency Department evaluation of a dog bite.  Patient states that he has 2 pit bulls who are vaccinated started fighting when he was attempting to break them up he got bit on his right hand and left wrist.  Stated that the dogs are up-to-date on vaccines his tetanus was updated within the year.    OUTSIDE HISTORIAN(S):  Select: None    EXTERNAL RECORDS REVIEWED  Select: Other no pertinent records      PAST MEDICAL HISTORY  History reviewed. No pertinent past medical history.  .    SURGICAL HISTORY  History reviewed. No pertinent surgical history.      FAMILY HISTORY  History reviewed. No pertinent family history.       SOCIAL HISTORY  Social History     Socioeconomic History    Marital status: Single     Spouse name: Not on file    Number of children: Not on file    Years of education: Not on file    Highest education level: Not on file   Occupational History    Not on file   Tobacco Use    Smoking status: Every Day     Types: Cigarettes    Smokeless tobacco: Never   Substance and Sexual Activity    Alcohol use: Not Currently    Drug use: Yes     Types: Inhaled     Comment: meth    Sexual activity: Not on file   Other Topics Concern    Not on file   Social History Narrative    Not on file     Social Determinants of Health     Financial Resource Strain: Not on file   Food Insecurity: Not on file   Transportation Needs: Not on file   Physical Activity: Not on file   Stress: Not on file   Social Connections: Not on file   Intimate Partner Violence: Not on file   Housing Stability: Not on file         CURRENT MEDICATIONS  No current facility-administered medications on file prior  to encounter.     Current Outpatient Medications on File Prior to Encounter   Medication Sig Dispense Refill    azithromycin (ZITHROMAX) 250 MG Tab Use as package directs 6 Tab 0    benzonatate (TESSALON) 200 MG capsule Take 1 Cap by mouth 3 times a day as needed for Cough. 30 Cap 0           ALLERGIES  Allergies   Allergen Reactions    Pcn [Penicillins]      Tolerates Amoxicillin       PHYSICAL EXAM  VITAL SIGNS:/61   Pulse 66   Temp 36.5 °C (97.7 °F) (Temporal)   Resp 16   Wt 67.7 kg (149 lb 4 oz)   SpO2 98%   BMI 20.82 kg/m²       VITALS - vital signs documented prior to this note have been reviewed and noted,  GENERAL - awake, alert, oriented, GCS 15, no apparent distress, non-toxic  appearing  HEENT - normocephalic, atraumatic, pupils equal, sclera anicteric, mucus  membranes moist  NECK - supple, no meningismus, full active range of motion, trachea midline  CARDIOVASCULAR - regular rate/rhythm, no murmurs/gallops/rubs  PULMONARY - no respiratory distress, speaking in full sentences, clear to  auscultation bilaterally, no wheezing/ronchi/rales, no accessory muscle use  GASTROINTESTINAL - soft, non-tender, non-distended, no rebound, guarding,  or peritonitis  GENITOURINARY - Deferred  NEUROLOGIC - Awake alert, normal mental status, speech fluid, cognition  normal, moves all extremities  MUSCULOSKELETAL - no obvious asymmetry or deformities present  EXTREMITIES - warm, well-perfused, no cyanosis or significant edema  DERMATOLOGIC - warm, dry, no rashes, no jaundice  PSYCHIATRIC - normal affect, normal insight, normal concentration              DIAGNOSTIC STUDIES / PROCEDURES    RADIOLOGY  I have independently interpreted the diagnostic imaging associated with this visit and am waiting the final reading from the radiologist.   My preliminary interpretation is as follows: Questionable fracture of fourth PIP      Radiologist interpretation:   DX-HAND 2- RIGHT   Final Result         1.  Small ossific  density adjacent to the proximal interphalangeal joint of the ring finger, could represent small ligaments avulsion fracture fragment or possibly tooth fragment.      DX-WRIST-LIMITED 2- LEFT   Final Result         1.  No radiographic evidence of acute traumatic injury.   2.  Soft tissue gas at the wrist.           COURSE & MEDICAL DECISION MAKING    ED COURSE:    ED Observation Status? no    INTERVENTIONS BY ME:  Medications   acetaminophen (Tylenol) tablet 650 mg (650 mg Oral Given 12/11/23 2141)   oxyCODONE-acetaminophen (Percocet) 5-325 MG per tablet 1 Tablet (1 Tablet Oral Given 12/11/23 2140)   ibuprofen (Motrin) tablet 600 mg (600 mg Oral Given 12/11/23 2140)   lidocaine-epinephrine 1 %-1:243722 1 %-1:217118 injection 20 mL (20 mL Injection Given by Provider 12/11/23 2159)   amoxicillin-clavulanate (Augmentin) 875-125 MG per tablet 1 Tablet (1 Tablet Oral Given 12/11/23 2214)     PERFORMED BY - Mauricio Nance DO  PROCEDURE - Laceration repair    PROCEDURE IN DETAIL - The skin was prepped and draped. 5 mL of 1%  lidocaine with/without epinephrine was used in local infiltration with good  anesthetic result. The wound was copiously irrigated with normal saline under  pressure. The wound was inspected for foreign body and for injury to deep  structures. No foreign body and no additional injuries were noted.     The wound was  closed with 1 simple interrupted suture total wound length repaired 3 cm with good hemostasis and good approximation.  COMPLICATIONS - There were no complications with the procedure.          INITIAL ASSESSMENT, COURSE AND PLAN  Care Narrative: Patient presented for evaluation of a dog bite.  he was breaking up his own dogs from fighting when he was bit on his right hand and left wrist.  X-rays were obtained, there was a questionable avulsion fracture in of his fourth PIP joint, the wound was copiously irrigated and explored in a bloodless field there is no visible foreign body I do  suspect this is likely an underlying avulsion fracture given that there is an overlying laceration and it was a dog bite I did call and speak with on-call orthopedist Dr. Cruz, who recommended a irrigation in the emergency department antibiotics and outpatient follow-up.  The wounds were irrigated copiously in the emergency department all of the hand wounds were left open given that it was a dog bite.  The right fourth digit was placed in a finger splint.  The left wrist laceration was gaping, thus it was loosely approximated with 1 simple interrupted suture.  Patient was given a dose of Augmentin while in the emergency department will be discharged on the same his tetanus is up-to-date.  He was instructed to follow-up with orthopedist.  Return precautions and he was discharged in stable condition           ADDITIONAL PROBLEM LIST    DISPOSITION AND DISCUSSIONS  I have discussed management of the patient with the following physicians and GARCIA's: Orthopedist    Barriers to care at this time, including but not limited to: Patient does not have established PCP.     Decision tools and prescription drugs considered including, but not limited to: Antibiotics   .    FINAL DIAGNOSIS  1. Dog bite, initial encounter Acute   2. Avulsion fracture Acute            Electronically signed by: Mauricio Nance DO10:52 PM 12/11/23   no

## 2024-02-20 ENCOUNTER — APPOINTMENT (OUTPATIENT)
Dept: THORACIC SURGERY | Facility: CLINIC | Age: 69
End: 2024-02-20
Payer: MEDICARE

## 2024-02-20 VITALS
HEART RATE: 86 BPM | OXYGEN SATURATION: 98 % | RESPIRATION RATE: 17 BRPM | HEIGHT: 64 IN | DIASTOLIC BLOOD PRESSURE: 79 MMHG | BODY MASS INDEX: 23.22 KG/M2 | WEIGHT: 136 LBS | SYSTOLIC BLOOD PRESSURE: 127 MMHG

## 2024-02-20 PROCEDURE — 99213 OFFICE O/P EST LOW 20 MIN: CPT

## 2024-02-20 RX ORDER — LEVOFLOXACIN 500 MG/1
500 TABLET, FILM COATED ORAL DAILY
Qty: 10 | Refills: 0 | Status: COMPLETED | COMMUNITY
Start: 2023-09-26 | End: 2024-02-20

## 2024-02-20 NOTE — ASSESSMENT
[FreeTextEntry1] : Ms. DEMI CHACKO, 68 year old female, former smoker (30PY, quit 2009), w/ hx of COPD, kidney stones, and RSV bronchiolitis, who presented for routine visit with pulm Dr. Urbina. Further CT Chest 1/2022 revealed new TRISTAN lung nodule, repeat scan revealing increase in size with PET avidity. Referred by Dr. Joseluis Urbina (PULM).  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  Now, s/p RULobectomy with mediastinal lymph node dissection on 10/25/23 Path: Organizing pneumonia with a focal necrotic granuloma-like area and chronic inflammation; Bullous emphysema; Mucous impacted dilated airways. Cx pending *** OF note: Severe bullous emphysema and adhesions were present. Discharged home with Levaquin.   CT Chest on 2/15/2024: - new post-surgical changes from right upper lobectomy with hyper expansion of the right middle and lower lobes.  - stable postsurgical changes from L upper lobectomy - no new suspicious nodule  I have independently reviewed the medical records and imaging at the time of this office consultation, and discussed the following interpretations with the patient: - CT Chest reviewed with patient, no new suspicious nodule. RTC in 3 months with CXR.  Recommendations reviewed with patient during this office visit, and all questions answered; Patient instructed on the importance of follow up and verbalizes understanding.   I, Dr. FRANCO [facility], personally performed the evaluation and management (E/M) services for this established patient who presents today with (a) new problem(s)/exacerbation of (an) existing condition(s).  That E/M includes conducting the examination, assessing all new/exacerbated conditions, and establishing a new plan of care.  Today, my ACP, Ezra/KATARINA Wright, was here to observe my evaluation and management services for this new problem/exacerbated condition to be followed going forward.

## 2024-02-20 NOTE — HISTORY OF PRESENT ILLNESS
[FreeTextEntry1] : Ms. DEMI CHACKO, 68 year old female, former smoker (30PY, quit ), w/ hx of COPD, kidney stones, and RSV bronchiolitis, who presented for routine visit with pulm Dr. Urbina. Further CT Chest 2022 revealed new TRISTAN lung nodule, repeat scan revealing increase in size with PET avidity. Referred by Dr. Joseluis Urbina (PULM).  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  Postop course was complicated by desaturations in the ICU requiring nebulizers, IV steroids and oxygen therapy. Pt also had a prolonged Air Leak which improved after bedside bleomycin pleurodesis on 22. Post CT removal CXR showed sml ptx stable, discharged home with oxygen on 2022.  PFTs on 23: FVC 90%, FEV1 69%, DLCO 40%.  CT Chest on 2023 ([facility]): -Interval development of a spiculated masslike lesion in the right upper lobe abutting a portion of the minor fissure measuring 4.0 x 1.8 x 2.3 cm (series 2, images 44-50) - There is a suggestion of interval development of a 1.5 cm poorly defined ow-attenuation focus laterally within the dome of the right lobe of liver (series 3, image 81). A 2 mm calculus in the incompletely visualized left kidney is noted. - Post op changes- Marked emphysematous changes are again noted - Bronchial wall thickening is seen. - Postsurgical changes in left lung are again noted - Fibrotic/subsegmental atelectatic changes in left lung are again noted. - Fibrotic/subsegmental atelectatic changes elsewhere in the right lung are again noted.  PET/CT on 10/3/23 at [facility]: - focal FDG activity corresponding with a consolidative masslike right upper lobe opacity (SUV 2.2, approximately 3.6 x 1.5 cm, series 3 image 102). This appears unchanged from the CT chest of 2023. Malignancy cannot be excluded. - no mediastinal mass or adenopathy  MR abdomen on 10/23/23 - A 2.0 cm hypervascular right hepatic lobe lesion. While of unclear origin, imaging characteristics favor benign etiology.  Lung Perfusion scan on 10/24/23: Right lun%; Left lun% Right upper lun% Left upper lun% Right mid lun% Left mid lun% Right lower lun% Left lower lun%  Now, s/p RULobectomy with mediastinal lymph node dissection on 10/25/23 Path: Organizing pneumonia with a focal necrotic granuloma-like area and chronic inflammation; Bullous emphysema; Mucous impacted dilated airways. Cx pending *** OF note: Severe bullous emphysema and adhesions  were present. Discharged home with Levaquin.   CXR on 23: - The previously reported masslike density on the patient's prior CT chest CT is not definitively identified on the current chest x-ray.  - There was also no right upper lobe mass seen on prior chest x-ray dated 2023. Follow-up chest CT is recommended for further evaluation due to the significant differences in sensitivity between the 2 modalities. - There are postsurgical changes in the left lung. - Mildly increased interstitial markings are noted in the right lower lung zone.  CT Chest on 2/15/2024: - new post-surgical changes from right upper lobectomy with hyper expansion of the right middle and lower lobes.  - stable postsurgical changes from L upper lobectomy - no new suspicious nodule  Patient presents today for follow up. She reports of productive cough; denies any SOB or CP.

## 2024-02-20 NOTE — PHYSICAL EXAM
[] : no respiratory distress [Auscultation Breath Sounds / Voice Sounds] : lungs were clear to auscultation bilaterally [Heart Rate And Rhythm] : heart rate was normal and rhythm regular [Heart Sounds] : normal S1 and S2 [Heart Sounds Gallop] : no gallops [Heart Sounds Pericardial Friction Rub] : no pericardial rub [Murmurs] : no murmurs [Examination Of The Chest] : the chest was normal in appearance [Chest Visual Inspection Thoracic Asymmetry] : no chest asymmetry [Diminished Respiratory Excursion] : normal chest expansion

## 2024-03-11 NOTE — DISCHARGE NOTE PROVIDER - YES NO FOR MLM POSITIVE OR NEGATIVE COVID RESULT
Physical Therapy    Visit Type: treatment  SUBJECTIVE  Patient agreed to participate in therapy this date.  RN in agreement to work with patient for therapy session.  Pt. Awaiting procedure for new chest tube but agreeable to moving and trying a walk.  Patient / Family Goal: return to previous functional status and return home    Pain   Patient reports pain is not an issue/concern.    Location: chest tube site \"a little sore\"     OBJECTIVE     Cognitive Status   Orientation    - Oriented to: person, place, time and situation  Functional Communication   - Overall Status: within functional limits  Attention Span    - Attention: intact  Following Direction   - follows all commands and directions consistently    Vitals:  Pt on room air; SpO2 93-95% with activity    Patient Activity Tolerance: 2 to 1 activity to rest      Range of Motion (ROM)   (degrees unless noted; active unless noted; norms in ( ); negative=lacking to 0, positive=beyond 0)  WFL: LLE, RLE    Strength  (out of 5 unless noted, standard test position unless noted)   WFL: LLE, RLE      Sitting Balance  (TAWANNA = base of support)  Static      - Trial 1 details: supervision and with back unsupported  Dynamic      - Trial 1 details: supervision and with back unsupported    Standing Balance  (TAWANNA = base of support)  Firm Surface: Double Leg      - Static, Eyes Open       - Trial 1 details: with double UE support and stand by assist     - Dynamic, Eyes Open       - Trial 1 details: with double UE support and stand by assist  Bilateral UE support on 2 WW       Bed Mobility  - Rolling left: supervision  - Side-lying to sit: supervision  Cues for log roll sequencing.  Increased time due to weakness and pain.   Transfers  Assistive devices: gait belt, 2-wheeled walker  - Sit to stand: supervision  - Stand to sit: supervision  Cues for hand placements.     Ambulation / Gait  - Assistive device: gait belt and 2-wheeled walker  - Distance (feet unless otherwise  indicated): 300  - Assist Level: supervision  - Surface: even  - Description: decreased felix/pace and step through  Pt chose to ambulate with walker and reports feeling more steady with device.     Interventions     Seated    Lower Extremity: Bilateral: seated hip flexion, toe raises, knee extensions, hip adduction and hip abduction, AROM, 10 reps, 1 sets  Training provided: activity tolerance, balance retraining, bed mobility training, use of assistive device, safety training, gait training, functional ambulation and transfer training    Skilled input: Verbal instruction/cues  Verbal Consent: Writer verbally educated and received verbal consent for hand placement, positioning of patient, and techniques to be performed today from patient          Education:   - Present and ready to learn: patient    ASSESSMENT   Patient will benefit from skilled therapy to address listed impairments and functional limitations.  Progress: progressing toward goals  Interferring components: decreased activity tolerance    Discharge needs based on today's assessment:   - Current level of function: slightly below baseline level of function   - Therapy needs at discharge: therapy 1-3 times per week   - Activities of daily living (ADLs) requiring support at discharge: bed mobility, transfers, ambulation and stairs   - Impairments that require further therapy intervention: activity tolerance, pain and strength    AM-PAC  - Generalized Prior Level of Function: IND/MOD I ([facility] 22-24)       Key: MOD A=moderate assistance, IND/MOD I=independent/modified independent  - Generalized Current Level of Function     - Current Mobility Score: 18       AM-PAC Scoring Key= >21 Modified Independent; 20-21 Supervision; 18-19 Minimal assist; 13-17 Moderate assist; 9-12 Max assist; <9 Total assist        Predicted patient presentation: Low (stable) - Patient comorbidities and complexities, as defined above, will have little effect on progress for  prescribed plan of care.    Pain at End of Session:   Pain: 0/10    PLAN (while hospitalized)  Suggestions for next session as indicated: Increase ambulation tolerance with vs without 2 WW, initiate stairs, bed mobility with log roll if pt still has chest tube to help manage pain  PT Frequency: 3-5 x per week      PT/OT Mobility Equipment for Discharge: may require 2 ww at discharge  Interventions: balance, bed mobility, stairs retraining, functional transfer training, safety education, patient/family training, HEP train/position, gait training and endurance training  Agreement to plan and goals: patient agrees with goals and treatment plan        GOALS  Review Date: 3/12/2024  Short Term Goals:   Patient will demonstrate and/or verbalize understanding of car transfer completion.  Not Met.  Long Term Goals: (to be met by time of discharge from hospital)  Rolling left: Patient will complete bed mobility for rolling left modified independent.  Status: progressing/ongoing  Rolling right: Patient will complete bed mobility for rolling right modified independent.  Status: progressing/ongoing  Sidelying to sit: Patient will complete bed mobility for sidelying to sit modified independent.  Status: progressing/ongoing  Sit to sidelying: Patient will complete bed mobility for sit to sidelying modified independent.  Status: progressing/ongoing  Sit to stand: Patient will complete sit to stand transfer with least restrictive device, modified independent.   Status: progressing/ongoing  Stand to sit: Patient will complete stand to sit transfer with least restrictive device, modified independent.   Status: progressing/ongoing  Car transfer: Patient will complete car transfer with least restrictive device, modified independent.  Status: not met  Ambulation (even): Patient will ambulate on even surface for 300 feet with least restrictive device, modified independent.   Status: progressing/ongoing  Curb step: Patient will ambulate  curb step with least restrictive device (x2), modified independent.   Status: not met  Flight of stairs: Patient will ambulate a flight of stairs with least restrictive device modified independent.   Status: not met  Stairs: Patient will ambulate steps with no device modified independent.   Status: not met  Home program: patient independent with home program as instructed.   Status: not met  Documented in the chart in the following areas: Assessment/Plan.      Patient at End of Session:   Location end of session: on couch in room.  Safety measures: call light within reach, equipment intact and alarm system in place/re-engaged  Handoff to: nurse      Therapy procedure time and total treatment time can be found documented on the Time Entry flowsheet   ,

## 2024-03-21 NOTE — DISCHARGE NOTE PROVIDER - NSDCMRMEDTOKEN_GEN_ALL_CORE_FT
fluticasone-salmeterol 250 mcg-50 mcg inhalation powder: 1 puff(s) inhaled 2 times a day  Spiriva Respimat 1.25 mcg/inh inhalation aerosol: 2 puff(s) inhaled once a day   No acetaminophen 325 mg oral tablet: 2 tab(s) orally every 6 hours  chest xray PA and lateral: Dx s/p TRISTAN lobectomy  fax Dr Villegas   fluticasone-salmeterol 250 mcg-50 mcg inhalation powder: 1 puff(s) inhaled 2 times a day  polyethylene glycol 3350 oral powder for reconstitution: 17 gram(s) orally 2 times a day  senna oral tablet: 2 tab(s) orally once a day (at bedtime)  Spiriva Respimat 1.25 mcg/inh inhalation aerosol: 2 puff(s) inhaled once a day  traMADol 50 mg oral tablet: 0.5 tab(s) orally every 4 hours, As needed, Moderate Pain (4 - 6) MDD:6   acetaminophen 325 mg oral tablet: 2 tab(s) orally every 6 hours  chest xray PA and lateral: Dx s/p TRISTAN lobectomy  fax Dr Villegas   fluticasone-salmeterol 250 mcg-50 mcg inhalation powder: 1 puff(s) inhaled 2 times a day  polyethylene glycol 3350 oral powder for reconstitution: 17 gram(s) orally 2 times a day  senna oral tablet: 2 tab(s) orally once a day (at bedtime)  Spiriva Respimat 1.25 mcg/inh inhalation aerosol: 2 puff(s) inhaled once a day  traMADol 50 mg oral tablet: 0.5 tab(s) orally every 4 hours, As needed, Moderate Pain (4 - 6) MDD:6  Zofran 4 mg oral tablet: 1 tab(s) orally every 8 hours, As Needed MDD:3

## 2024-05-20 ENCOUNTER — NON-APPOINTMENT (OUTPATIENT)
Age: 69
End: 2024-05-20

## 2024-05-21 ENCOUNTER — APPOINTMENT (OUTPATIENT)
Dept: THORACIC SURGERY | Facility: CLINIC | Age: 69
End: 2024-05-21
Payer: MEDICARE

## 2024-05-21 DIAGNOSIS — C34.92 MALIGNANT NEOPLASM OF UNSPECIFIED PART OF LEFT BRONCHUS OR LUNG: ICD-10-CM

## 2024-05-21 PROCEDURE — 99442: CPT

## 2024-05-21 NOTE — ASSESSMENT
[FreeTextEntry1] : Ms. DEMI CHACKO, 69 year old female, former smoker (30PY, quit 2009), w/ hx of COPD, kidney stones, and RSV bronchiolitis, who presented for routine visit with pulm Dr. Urbina. Further CT Chest 1/2022 revealed new TRISTAN lung nodule, repeat scan revealing increase in size with PET avidity. Referred by Dr. Joseluis Urbina (PULM).  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  Now, s/p RULobectomy with mediastinal lymph node dissection on 10/25/23 Path: Organizing pneumonia with a focal necrotic granuloma-like area and chronic inflammation; Bullous emphysema; Mucous impacted dilated airways. Cx negative *** OF note: Severe bullous emphysema and adhesions were present. Discharged home with Levaquin.   Patient presents today with follow up imaging.   I have independently reviewed the medical records and imaging at the time of this office consultation, and discussed the following interpretations with the patient: - CXR reviewed, no evidence of recurrence. I discussed she returns to clinic in 6 months with CT Chest without contrast.   Recommendations reviewed with patient during this office visit, and all questions answered; Patient instructed on the importance of follow up and verbalizes understanding.   I, STEVAN Bacon, personally performed the evaluation and management (E/M) services for this established patient. That E/M includes conducting the examination, assessing all new/exacerbated conditions, and establishing a new plan of care. Today, my ACP, Zohaib Centeno NP, was here to observe my evaluation and management services for this new problem/exacerbated condition to be followed going forward.

## 2024-05-21 NOTE — HISTORY OF PRESENT ILLNESS
[FreeTextEntry1] : Ms. DEMI CHACKO, 69 year old female, former smoker (30PY, quit ), w/ hx of COPD, kidney stones, and RSV bronchiolitis, who presented for routine visit with pulm Dr. Urbina. Further CT Chest 2022 revealed new TRISTAN lung nodule, repeat scan revealing increase in size with PET avidity. Referred by Dr. Joseluis Urbina (PULM).  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  Postop course was complicated by desaturations in the ICU requiring nebulizers, IV steroids and oxygen therapy. Pt also had a prolonged Air Leak which improved after bedside bleomycin pleurodesis on 22. Post CT removal CXR showed sml ptx stable, discharged home with oxygen on 2022.  PFTs on 23: FVC 90%, FEV1 69%, DLCO 40%.  CT Chest on 2023 ([facility]): -Interval development of a spiculated masslike lesion in the right upper lobe abutting a portion of the minor fissure measuring 4.0 x 1.8 x 2.3 cm (series 2, images 44-50) - There is a suggestion of interval development of a 1.5 cm poorly defined ow-attenuation focus laterally within the dome of the right lobe of liver (series 3, image 81). A 2 mm calculus in the incompletely visualized left kidney is noted. - Post op changes- Marked emphysematous changes are again noted - Bronchial wall thickening is seen. - Postsurgical changes in left lung are again noted - Fibrotic/subsegmental atelectatic changes in left lung are again noted. - Fibrotic/subsegmental atelectatic changes elsewhere in the right lung are again noted.  PET/CT on 10/3/23 at [facility]: - focal FDG activity corresponding with a consolidative masslike right upper lobe opacity (SUV 2.2, approximately 3.6 x 1.5 cm, series 3 image 102). This appears unchanged from the CT chest of 2023. Malignancy cannot be excluded. - no mediastinal mass or adenopathy  MR abdomen on 10/23/23 - A 2.0 cm hypervascular right hepatic lobe lesion. While of unclear origin, imaging characteristics favor benign etiology.  Lung Perfusion scan on 10/24/23: Right lun%; Left lun% Right upper lun% Left upper lun% Right mid lun% Left mid lun% Right lower lun% Left lower lun%  Now, s/p RULobectomy with mediastinal lymph node dissection on 10/25/23 Path: Organizing pneumonia with a focal necrotic granuloma-like area and chronic inflammation; Bullous emphysema; Mucous impacted dilated airways. Cx negative.  *** OF note: Severe bullous emphysema and adhesions  were present. Discharged home with Levaquin.   CXR on 23: - The previously reported masslike density on the patient's prior CT chest CT is not definitively identified on the current chest x-ray.  - There was also no right upper lobe mass seen on prior chest x-ray dated 2023. Follow-up chest CT is recommended for further evaluation due to the significant differences in sensitivity between the 2 modalities. - There are postsurgical changes in the left lung. - Mildly increased interstitial markings are noted in the right lower lung zone.  CT Chest on 2/15/2024: - new post-surgical changes from right upper lobectomy with hyper expansion of the right middle and lower lobes.  - stable postsurgical changes from L upper lobectomy - no new suspicious nodule  CXR on 2024: ([facility]) - Bilateral previous upper lobectomies - Arteriosclerosis - Moderate thoracic degenerative changes   Patient presents today, via tele, with follow up imaging. Overall, she reports to be feeling well. Denies any chest pain, shortness of breath, cough, or hemoptysis.

## 2024-05-21 NOTE — REASON FOR VISIT
[Home] : at home, [unfilled] , at the time of the visit. [Medical Office: ([facility])___] : at the medical office located in  [Verbal consent obtained from patient] : the patient, [unfilled]

## 2024-06-12 NOTE — DISCHARGE NOTE ADULT - FUNCTIONAL STATUS DATE
"2024       RE: Chino Ken  [address]   [id number]  [place] [address]     Dear Colleague,    Thank you for referring your patient, Chino Ken, to the [facility] [place] at [facility]. Please see a copy of my visit note below.    Video-Visit Details    Type of service:  Video Visit    Video Start Time: 11:02 AM   Video End Time: 11:32 AM     Originating Location (pt. Location): Home    Distant Location (provider location):  Offsite (providers home) [facility] [place]     Platform used for Video Visit: [employer]    New Weight Management Nutrition Consultation    Chino Ken is a 24 year old female presents today for new weight management nutrition consultation.  Patient referred by STELLA Leo on May 10, 2024.    Patient with Co-morbidities of obesity includin/10/2024     1:38 PM   --   I have the following health issues associated with obesity Pre-Diabetes    Polycystic Ovarian Syndrome   I have the following symptoms associated with obesity Knee Pain    Back Pain    Fatigue    Irregular Menstral Cycle     Anthropometrics:  Initial consult weight: 269 lb on 5/10/24.   Estimated body mass index is 49.6 kg/m  as calculated from the following:    Height as of this encounter: 1.6 m (5' 3\").    Weight as of this encounter: 127 kg (280 lb).  Current Weight: 280 lb      Medications for Weight Loss:  Wegovy - first injection given today.     NUTRITION HISTORY  Food allergies: NKFA  Food intolerances: None   Vitamin/Mineral Supplements: None   Previous methods of diet modification for weight loss: did not discuss  RD before: did not discuss     Typically eats 1-2 meals a day. Craves sweets . Is able to get full. Can sometimes stay full until next meal, will feel hungry 4 hours after feeling full. Does struggle with portion control. Does experience food noise. Does not experience " emotional eating, wonders if she used to in the past, is unsure . Does not experience a loss of control around eating. Describes one episode of making herself throw up after overeating due to nausea after overeating.     Goals discussed with Akua   Cutting out all sugary drinks   Drinking more water   Walking 3-4 blocks every day     Diet recall:   Breakfast - grilled chicken/egg whites, fruit, water     Hydration: Cutting out soda, drinking more water.     Patient very nervous to start Wegovy due to having a fear of needles. First 15 minutes of conversation with patient was walking her through how to provide her first Wegovy injection. Patient had her  give her injection while we were on video.         5/10/2024     1:38 PM   Diet Recall Review with Patient   If you do eat lunch, what types of food do you typically eat? sandwiches   How many cans/bottles of sugar pop/soda/tea/sports drinks do you drink in a day? 1   How many cans/bottles of diet pop/soda/tea or sports drink do you drink in a day? 1   How often do you have a drink of alcohol? Never           5/10/2024     1:38 PM   Eating Habits   Generally, my meals include foods like these bread, pasta, rice, potatoes, corn, crackers, sweet dessert, pop, or juice Almost Everyday   Generally, my meals include foods like these fried meats, brats, burgers, french fries, pizza, cheese, chips, or ice cream Almost Everyday   Eat fast food (like McDonalds, Burger Dean, Taco Bell) Almost Everyday   Eat at a buffet or sit-down restaurant Once a Week   Rarely sit down for a meal but snack or graze throughout Never   Eat extra snacks between meals Never   Eat most of my food at the end of the day A Few Times a Week   Eat in the middle of the night or wake up at night to eat Never   Eat extra snacks to prevent or correct low blood sugar Never   Eat to prevent acid reflux or stomach pain Never   Worry about not having enough food to eat Never   I eat when I am  depressed Never   I eat when I am stressed Once a Week   I finish all the food on my plate even if I am already full A Few Times a Week   I can't resist eating delicious food or walk past the good food/smell Everyday   I eat/snack without noticing that I am eating Never   I eat when I am preparing the meal Once a Week   I eat more than usual when I see others eating Less Than Weekly   I have trouble not eating sweets, ice cream, cookies, or chips if they are around the house Less Than Weekly   I think about food all day Almost Everyday   What foods, if any, do you crave? Sweets/Candy/Chocolate           5/10/2024     1:38 PM   Amount of Food   I feel out of control when eating Never   I eat a large amount of food, like a loaf of bread, a box of cookies, a pint/quart of ice cream, all at once Almost Everyday   I eat a large amount of food even when I am not hungry Never   I eat rapidly Everyday   I eat alone because I feel embarrassed and do not want others to see how much I have eaten Monthly   I eat until I am uncomfortably full Never   I feel bad, disgusted, or guilty after I overeat Monthly       Physical Activity:  Did not discuss         5/10/2024     1:38 PM   Activity/Exercise History   How much of a typical 12 hour day do you spend sitting? Most of the Day   How much of a typical 12 hour day do you spend lying down? Less Than Half the Day   How much of a typical day do you spend walking/standing? Most of the Day   How many hours (not including work) do you spend on the TV/Video Games/Computer/Tablet/Phone? 4-5 Hours   How many times a week are you active for the purpose of exercise? Once a Week   What keeps you from being more active? Shortness of Breath    Lack of Time   How many total minutes do you spend doing some activity for the purpose of exercising when you exercise? 15-30 Minutes     Nutrition Prescription  Recommended energy/nutrient modification.    Nutrition Diagnosis  Obesity r/t long history of  "positive energy balance aeb BMI >30.    Nutrition Intervention  Provided education on nutrition considerations when starting GLP1 medication including, changes in meal/snack volumes; meeting protein, hydration and micronutrient needs; limiting high-fat foods; and diet/lifestyle strategies for preventing constipation.  Patient demonstrates understanding. Co-developed goals to work towards.   Provided pt with list of goals and resources below via [employer].     Expected Engagement: good    Nutrition Goals  1) Have a good source of protein with all meals. Aim for at least 60 grams of protein per day.   2) Increase fruits and vegetables in your diet.   3) Aim for at least 64 oz of water daily.     The Plate Method  [email or web address]    Protein Sources   [email or web address]     Quick/Easy Protein Sources:  Hard boiled eggs  Part-skim cheese sticks  Baby Bell cheese rounds  Low-fat/low-sugar Greek yogurt  Low-fat cottage cheese  Lean deli meat (chicken/turkey/ham)  Roasted chickpeas or lentils  Nuts   Turkey meat stick  Protein shake/bar  \"P3\" snack (cheese, nuts, deli meat)  [employer]'s \"Protein Bread\"   \"Egglife\" egg white wrap    Tuna/salmon can/packet     Carbohydrates  [email or web address]     Mindful Eating  [email or web address]     Summary of Volumetrics Eating Plan  [email or web address]     Follow-Up: 1-2 months     Time spent with patient: 30 minutes.  Betsy Owen RD, LD    " 09-May-2018

## 2024-06-29 NOTE — PHYSICAL THERAPY INITIAL EVALUATION ADULT - ADL SKILLS, REHAB EVAL
[Diabetes Foot Care] : diabetes foot care independent [Long Term Vascular Complications] : long term vascular complications of diabetes [Carbohydrate Consistent Diet] : carbohydrate consistent diet [Importance of Diet and Exercise] : importance of diet and exercise to improve glycemic control, achieve weight loss and improve cardiovascular health [Exercise/Effect on Glucose] : exercise/effect on glucose [Hypoglycemia Management] : hypoglycemia management [Self Monitoring of Blood Glucose] : self monitoring of blood glucose [Retinopathy Screening] : Patient was referred to ophthalmology for retinopathy screening [FreeTextEntry1] : 57 yo M with PMH of HLD, vitamin D deficiency, h/o acute bilateral PE (2019 now off Eliquis), h/o kidney stone s/p lithotripsy (10 years ago), here to establish care for new diagnosis of Type 2 Diabetes.  Type 2 Diabetes: -  A1c 11.0% (3/9/2024) --> 9.6% (4/2/2024). - Glucose log reviewed: fasting BG mostly at goal. - Continue metformin 1000 mg qam for now. Advised to check staggered BG, if elevated BG then should increase metformin to 1000 mg + 500 mg daily. - May be future candidate for GLP-1 if needed for further glycemic control or weight loss benefit (BMI 34). R+B of GLP-1 discussed with the patient. Denies personal or family history of thyroid cancer or pancreatitis. Patient prefers to work on lifestyle modifications first. - Discussed hypoglycemia management.  - Discussed diet modification, carb consistent diet, and exercise. Has already made some dietary changes, is motivated to start exercising more again. - Continue SMBG 2-3x daily in staggered manner (keep glucose log/bring meter to all visits and send readings to us). Call if persistent highs or lows.  - F/u with ophthalmology and podiatry annually. UTD with ophtho.  BP: - Goal BP <130/80, /80 today. - Denies h/o HTN. Not on antihypertensives. - eGFR 96 (4/2/2024). UACR negative (4/3/2024).   HLD: - Goal LDL <70. , HDL 34,  (4/2/2024). - Not on statin. Patient defers statin and prefers to work on lifestyle modifications first. - Can repeat lipid panel at future visit. - Continue with diet management, low fat diet, and exercise.  Vitamin D deficiency: - Vitamin D 11.5 (4/2/2024).  - Taking weekly ergocalciferol.  F/u in 3 months with Dr. Yeh.  Tiffani Vee NP (Brenda)

## 2024-09-09 ENCOUNTER — NON-APPOINTMENT (OUTPATIENT)
Age: 69
End: 2024-09-09

## 2024-09-10 ENCOUNTER — APPOINTMENT (OUTPATIENT)
Dept: OTOLARYNGOLOGY | Facility: CLINIC | Age: 69
End: 2024-09-10
Payer: MEDICARE

## 2024-09-10 VITALS
HEART RATE: 101 BPM | BODY MASS INDEX: 23.56 KG/M2 | HEIGHT: 64 IN | SYSTOLIC BLOOD PRESSURE: 113 MMHG | DIASTOLIC BLOOD PRESSURE: 78 MMHG | WEIGHT: 138 LBS

## 2024-09-10 DIAGNOSIS — R49.0 DYSPHONIA: ICD-10-CM

## 2024-09-10 DIAGNOSIS — J31.0 CHRONIC RHINITIS: ICD-10-CM

## 2024-09-10 DIAGNOSIS — H93.292 OTHER ABNORMAL AUDITORY PERCEPTIONS, LEFT EAR: ICD-10-CM

## 2024-09-10 DIAGNOSIS — H61.22 IMPACTED CERUMEN, LEFT EAR: ICD-10-CM

## 2024-09-10 PROCEDURE — 69210 REMOVE IMPACTED EAR WAX UNI: CPT | Mod: 59,LT

## 2024-09-10 PROCEDURE — 31575 DIAGNOSTIC LARYNGOSCOPY: CPT

## 2024-09-10 PROCEDURE — 99213 OFFICE O/P EST LOW 20 MIN: CPT | Mod: 25

## 2024-09-10 RX ORDER — FLUTICASONE PROPIONATE 50 UG/1
50 SPRAY, METERED NASAL
Qty: 3 | Refills: 3 | Status: ACTIVE | COMMUNITY
Start: 2024-09-10 | End: 1900-01-01

## 2024-09-10 RX ORDER — AZELASTINE HYDROCHLORIDE 137 UG/1
0.1 SPRAY, METERED NASAL TWICE DAILY
Qty: 3 | Refills: 3 | Status: ACTIVE | COMMUNITY
Start: 2024-09-10 | End: 1900-01-01

## 2024-09-10 NOTE — HISTORY OF PRESENT ILLNESS
[de-identified] : 69 yr old female c/o clog AS on and off -otalgia, tinnitus, dizy +Qtips +childhood otiits, FH daughter -noise exp, head trauma  c/o lots of throat clearing and hoarseness for a couple of days +sniffles no discolored mucous -hx GERD, heartburn  lung surgery 10/2023 (benign)  and 5/2022 (CA TRISTAN,  w post op hoarseness due to vc paresis, responded well to voice tx)

## 2024-09-10 NOTE — PHYSICAL EXAM
[de-identified] : CI AS [Normal] : mucosa is normal [Midline] : trachea located in midline position

## 2024-11-19 ENCOUNTER — APPOINTMENT (OUTPATIENT)
Dept: THORACIC SURGERY | Facility: CLINIC | Age: 69
End: 2024-11-19
Payer: MEDICARE

## 2024-11-19 DIAGNOSIS — C34.92 MALIGNANT NEOPLASM OF UNSPECIFIED PART OF LEFT BRONCHUS OR LUNG: ICD-10-CM

## 2024-11-19 PROCEDURE — 99213 OFFICE O/P EST LOW 20 MIN: CPT

## 2024-12-06 ENCOUNTER — EMERGENCY (EMERGENCY)
Facility: HOSPITAL | Age: 69
LOS: 1 days | Discharge: HOME | End: 2024-12-06
Attending: EMERGENCY MEDICINE | Admitting: EMERGENCY MEDICINE
Payer: MEDICARE

## 2024-12-06 VITALS
OXYGEN SATURATION: 97 % | DIASTOLIC BLOOD PRESSURE: 81 MMHG | SYSTOLIC BLOOD PRESSURE: 131 MMHG | TEMPERATURE: 98 F | RESPIRATION RATE: 18 BRPM | HEART RATE: 84 BPM

## 2024-12-06 VITALS
HEIGHT: 63 IN | OXYGEN SATURATION: 98 % | TEMPERATURE: 98 F | RESPIRATION RATE: 16 BRPM | SYSTOLIC BLOOD PRESSURE: 139 MMHG | DIASTOLIC BLOOD PRESSURE: 87 MMHG | HEART RATE: 97 BPM | WEIGHT: 134.92 LBS

## 2024-12-06 DIAGNOSIS — Z98.89 OTHER SPECIFIED POSTPROCEDURAL STATES: Chronic | ICD-10-CM

## 2024-12-06 DIAGNOSIS — Z98.890 OTHER SPECIFIED POSTPROCEDURAL STATES: Chronic | ICD-10-CM

## 2024-12-06 DIAGNOSIS — Z90.89 ACQUIRED ABSENCE OF OTHER ORGANS: Chronic | ICD-10-CM

## 2024-12-06 LAB
ALBUMIN SERPL ELPH-MCNC: 4 G/DL — SIGNIFICANT CHANGE UP (ref 3.3–5)
ALP SERPL-CCNC: 82 U/L — SIGNIFICANT CHANGE UP (ref 40–120)
ALT FLD-CCNC: 35 U/L — SIGNIFICANT CHANGE UP (ref 12–78)
ANION GAP SERPL CALC-SCNC: 3 MMOL/L — LOW (ref 5–17)
AST SERPL-CCNC: 14 U/L — LOW (ref 15–37)
BASOPHILS # BLD AUTO: 0.07 K/UL — SIGNIFICANT CHANGE UP (ref 0–0.2)
BASOPHILS NFR BLD AUTO: 1.2 % — SIGNIFICANT CHANGE UP (ref 0–2)
BILIRUB SERPL-MCNC: 0.5 MG/DL — SIGNIFICANT CHANGE UP (ref 0.2–1.2)
BUN SERPL-MCNC: 15 MG/DL — SIGNIFICANT CHANGE UP (ref 7–23)
CALCIUM SERPL-MCNC: 9.8 MG/DL — SIGNIFICANT CHANGE UP (ref 8.5–10.1)
CHLORIDE SERPL-SCNC: 108 MMOL/L — SIGNIFICANT CHANGE UP (ref 96–108)
CO2 SERPL-SCNC: 31 MMOL/L — SIGNIFICANT CHANGE UP (ref 22–31)
CREAT SERPL-MCNC: 0.66 MG/DL — SIGNIFICANT CHANGE UP (ref 0.5–1.3)
EGFR: 95 ML/MIN/1.73M2 — SIGNIFICANT CHANGE UP
EOSINOPHIL # BLD AUTO: 0.14 K/UL — SIGNIFICANT CHANGE UP (ref 0–0.5)
EOSINOPHIL NFR BLD AUTO: 2.4 % — SIGNIFICANT CHANGE UP (ref 0–6)
GLUCOSE SERPL-MCNC: 129 MG/DL — HIGH (ref 70–99)
HCT VFR BLD CALC: 42.9 % — SIGNIFICANT CHANGE UP (ref 34.5–45)
HGB BLD-MCNC: 14.3 G/DL — SIGNIFICANT CHANGE UP (ref 11.5–15.5)
IMM GRANULOCYTES NFR BLD AUTO: 0.2 % — SIGNIFICANT CHANGE UP (ref 0–0.9)
LYMPHOCYTES # BLD AUTO: 1.77 K/UL — SIGNIFICANT CHANGE UP (ref 1–3.3)
LYMPHOCYTES # BLD AUTO: 30.4 % — SIGNIFICANT CHANGE UP (ref 13–44)
MCHC RBC-ENTMCNC: 29.9 PG — SIGNIFICANT CHANGE UP (ref 27–34)
MCHC RBC-ENTMCNC: 33.3 G/DL — SIGNIFICANT CHANGE UP (ref 32–36)
MCV RBC AUTO: 89.6 FL — SIGNIFICANT CHANGE UP (ref 80–100)
MONOCYTES # BLD AUTO: 0.56 K/UL — SIGNIFICANT CHANGE UP (ref 0–0.9)
MONOCYTES NFR BLD AUTO: 9.6 % — SIGNIFICANT CHANGE UP (ref 2–14)
NEUTROPHILS # BLD AUTO: 3.27 K/UL — SIGNIFICANT CHANGE UP (ref 1.8–7.4)
NEUTROPHILS NFR BLD AUTO: 56.2 % — SIGNIFICANT CHANGE UP (ref 43–77)
NRBC # BLD: 0 /100 WBCS — SIGNIFICANT CHANGE UP (ref 0–0)
PLATELET # BLD AUTO: 248 K/UL — SIGNIFICANT CHANGE UP (ref 150–400)
POTASSIUM SERPL-MCNC: 3.8 MMOL/L — SIGNIFICANT CHANGE UP (ref 3.5–5.3)
POTASSIUM SERPL-SCNC: 3.8 MMOL/L — SIGNIFICANT CHANGE UP (ref 3.5–5.3)
PROT SERPL-MCNC: 7.1 G/DL — SIGNIFICANT CHANGE UP (ref 6–8.3)
RBC # BLD: 4.79 M/UL — SIGNIFICANT CHANGE UP (ref 3.8–5.2)
RBC # FLD: 12 % — SIGNIFICANT CHANGE UP (ref 10.3–14.5)
SODIUM SERPL-SCNC: 142 MMOL/L — SIGNIFICANT CHANGE UP (ref 135–145)
TROPONIN I, HIGH SENSITIVITY RESULT: 3.7 NG/L — SIGNIFICANT CHANGE UP
WBC # BLD: 5.82 K/UL — SIGNIFICANT CHANGE UP (ref 3.8–10.5)
WBC # FLD AUTO: 5.82 K/UL — SIGNIFICANT CHANGE UP (ref 3.8–10.5)

## 2024-12-06 PROCEDURE — 71045 X-RAY EXAM CHEST 1 VIEW: CPT | Mod: 26

## 2024-12-06 PROCEDURE — 36415 COLL VENOUS BLD VENIPUNCTURE: CPT

## 2024-12-06 PROCEDURE — 99053 MED SERV 10PM-8AM 24 HR FAC: CPT

## 2024-12-06 PROCEDURE — 84484 ASSAY OF TROPONIN QUANT: CPT

## 2024-12-06 PROCEDURE — 93010 ELECTROCARDIOGRAM REPORT: CPT

## 2024-12-06 PROCEDURE — 85025 COMPLETE CBC W/AUTO DIFF WBC: CPT

## 2024-12-06 PROCEDURE — 93005 ELECTROCARDIOGRAM TRACING: CPT

## 2024-12-06 PROCEDURE — 94640 AIRWAY INHALATION TREATMENT: CPT

## 2024-12-06 PROCEDURE — 71045 X-RAY EXAM CHEST 1 VIEW: CPT

## 2024-12-06 PROCEDURE — 99285 EMERGENCY DEPT VISIT HI MDM: CPT

## 2024-12-06 PROCEDURE — 99285 EMERGENCY DEPT VISIT HI MDM: CPT | Mod: 25

## 2024-12-06 PROCEDURE — 80053 COMPREHEN METABOLIC PANEL: CPT

## 2024-12-06 RX ORDER — FLUTICASONE PROPIONATE 50 MCG
2 SPRAY, SUSPENSION NASAL ONCE
Refills: 0 | Status: COMPLETED | OUTPATIENT
Start: 2024-12-06 | End: 2024-12-06

## 2024-12-06 RX ORDER — SODIUM CHLORIDE 9 MG/ML
1 INJECTION, SOLUTION INTRAMUSCULAR; INTRAVENOUS; SUBCUTANEOUS
Qty: 90 | Refills: 0
Start: 2024-12-06

## 2024-12-06 RX ORDER — SODIUM CHLORIDE 9 MG/ML
3 INJECTION, SOLUTION INTRAMUSCULAR; INTRAVENOUS; SUBCUTANEOUS ONCE
Refills: 0 | Status: COMPLETED | OUTPATIENT
Start: 2024-12-06 | End: 2024-12-06

## 2024-12-06 RX ADMIN — SODIUM CHLORIDE 3 MILLILITER(S): 9 INJECTION, SOLUTION INTRAMUSCULAR; INTRAVENOUS; SUBCUTANEOUS at 04:30

## 2024-12-06 RX ADMIN — Medication 2 SPRAY(S): at 04:24

## 2024-12-06 NOTE — ED ADULT NURSE NOTE - OBJECTIVE STATEMENT
pt presents to the ED c/o cough that started tonight. denies phlegm production. pt states she is currently taking zpak. denies fever, chills, diaphoresis, chest pain, sob, weakness, dizziness, n/v @ this time. no difficulty swallowing or throat itching noted. placed on cardiac monitor showing nsr 83 bpm. pulse ox 100% on RA. ekg done. normal saline nebulizer treatment done and tolerated well. aox4, maex4. spouse @ bedside. iv lock 20 g placed to rac, patent and flushing. no s/s redness, swelling or infiltration. labs collected and sent. pending results. NAD.

## 2024-12-06 NOTE — ED PROVIDER NOTE - NSFOLLOWUPINSTRUCTIONS_ED_ALL_ED_FT
Follow-up with your doctor after you return from your vacation.  You may use a mucolytic agent such as Mucinex to help break up secretions to allow it easier for you to expectorate.  You may also use saline nebulizers as needed.    Postnasal Drip  Postnasal drip is the feeling of mucus going down the back of your throat. Mucus is a slimy substance that moistens and cleans your nose and throat, as well as the air pockets in face bones near your forehead and cheeks (sinuses). Small amounts of mucus pass from your nose and sinuses down the back of your throat all the time. This is normal. When you produce too much mucus or the mucus gets too thick, you can feel it.    Some common causes of postnasal drip include:  Having more mucus because of:  A cold or the flu.  Allergies.  Cold air.  Certain medicines.  Gastroesophageal reflux.  Having more mucus that is thicker because of:  A sinus or nasal infection.  Dry air.  A food allergy.  Follow these instructions at home:  Relieving discomfort    A person using nasal spray.  Gargle with a mixture of salt and water 3–4 times a day or as needed. To make salt water, completely dissolve ½–1 tsp (3–6 g) of salt in 1 cup (237 mL) of warm water.  If the air in your home is dry, use a humidifier to add moisture to the air.  Use a saline spray or a container (neti pot) to flush out the nose (nasal irrigation). These methods can help clear away mucus and keep the nasal passages moist.  General instructions    Take over-the-counter and prescription medicines only as told by your health care provider.  Follow instructions from your health care provider about eating or drinking restrictions. You may need to avoid caffeine.  Avoid things that you know you are allergic to (allergens), like dust, mold, pollen, pets, or certain foods.  Drink enough fluid to keep your urine pale yellow.  Keep all follow-up visits. This is important.  Contact a health care provider if:  You have a fever.  You have a sore throat or difficulty swallowing.  You have a headache.  You have sinus or ear pain.  You have a cough that does not go away.  The mucus from your nose becomes thick and is green or yellow in color.  You have cold or flu symptoms that last more than 10 days.  Summary  Postnasal drip is the feeling of mucus going down the back of your throat.  Use nasal irrigation or a nasal spray to help clear away mucus and keep the nasal passages moist.  Avoid things that you know you are allergic to (allergens), like dust, mold, pollen, pets, or certain foods.  This information is not intended to replace advice given to you by your health care provider. Make sure you discuss any questions you have with your health care provider.

## 2024-12-06 NOTE — ED PROVIDER NOTE - CLINICAL SUMMARY MEDICAL DECISION MAKING FREE TEXT BOX
This 69-year-old female presenting with an episode of paroxysmal nocturnal dyspnea which is resolved prior to arrival.  Patient is reporting possible allergies a lot of nasal congestion and postnasal drip.  No history of sleep apnea.  Lungs are clear to auscultation.  O2 sat is 100%.  PND versus anxiety versus undiagnosed sleep apnea on the differential.  Will get labs, EKG, chest x-ray.  Will reassess. Will give saline neb to help patient expectorate phlegm.

## 2024-12-06 NOTE — ED PROVIDER NOTE - OBJECTIVE STATEMENT
Called patient, had to leave message on voice mail for pt to call back     Will also sent portal message      [facility] insurance authorization reference # [id number]. [facility] has insurance authorization but no beds today. 68 yo F with hx of Emphysema, Lung cancer s/p lobectomy (both lungs at different times, last resection in 2023) presents for c/o waking up suddenly from her sleep and feeling that she cannot breathe. States she tried coughing to get the mucus out but it was giving her  a 70 yo F with hx of Emphysema, Lung cancer s/p lobectomy (both lungs at different times, last resection in 2023) presents for c/o waking up suddenly from her sleep and feeling that she cannot breathe. States she tried coughing to get the mucus out but it was giving her a choking sensation.  Patient is currently breathing comfortably, oxygen is at 100% on room air.  Denies chest pain.  States her symptoms are resolved.

## 2024-12-06 NOTE — ED PROVIDER NOTE - CARE PROVIDER_API CALL
Samson Cuevas [address], [address] [place] [address]  Phone: [phone]  Fax: [phone]  Established Patient  Follow Up Time: 1-3 Days

## 2024-12-06 NOTE — ED ADULT NURSE NOTE - NSFALLUNIVINTERV_ED_ALL_ED
Bed/Stretcher in lowest position, wheels locked, appropriate side rails in place/Call bell, personal items and telephone in reach/Instruct patient to call for assistance before getting out of bed/chair/stretcher/Non-slip footwear applied when patient is off stretcher/[place] to call system/Physically safe environment - no spills, clutter or unnecessary equipment/Purposeful proactive rounding/Room/bathroom lighting operational, light cord in reach

## 2024-12-06 NOTE — ED PROVIDER NOTE - PATIENT PORTAL LINK FT
You can access the FollowMyHealth Patient Portal offered by [facility] by registering at the following website: [email or web address]. By joining [employer]’s FollowMyHealth portal, you will also be able to view your health information using other applications (apps) compatible with our system.

## 2025-01-03 ENCOUNTER — NON-APPOINTMENT (OUTPATIENT)
Age: 70
End: 2025-01-03

## 2025-01-03 ENCOUNTER — APPOINTMENT (OUTPATIENT)
Dept: OTOLARYNGOLOGY | Facility: CLINIC | Age: 70
End: 2025-01-03
Payer: MEDICARE

## 2025-01-03 VITALS
DIASTOLIC BLOOD PRESSURE: 87 MMHG | HEIGHT: 64 IN | HEART RATE: 97 BPM | BODY MASS INDEX: 23.05 KG/M2 | SYSTOLIC BLOOD PRESSURE: 133 MMHG | WEIGHT: 135 LBS

## 2025-01-03 DIAGNOSIS — R49.0 DYSPHONIA: ICD-10-CM

## 2025-01-03 DIAGNOSIS — J38.00 PARALYSIS OF VOCAL CORDS AND LARYNX, UNSPECIFIED: ICD-10-CM

## 2025-01-03 PROCEDURE — 99213 OFFICE O/P EST LOW 20 MIN: CPT | Mod: 25

## 2025-01-03 PROCEDURE — 31575 DIAGNOSTIC LARYNGOSCOPY: CPT

## 2025-01-13 ENCOUNTER — APPOINTMENT (OUTPATIENT)
Dept: OTOLARYNGOLOGY | Facility: CLINIC | Age: 70
End: 2025-01-13

## 2025-02-04 ENCOUNTER — APPOINTMENT (OUTPATIENT)
Dept: OTOLARYNGOLOGY | Facility: CLINIC | Age: 70
End: 2025-02-04
Payer: MEDICARE

## 2025-02-04 PROCEDURE — 31579 LARYNGOSCOPY TELESCOPIC: CPT | Mod: GN

## 2025-02-04 PROCEDURE — 92524 BEHAVRAL QUALIT ANALYS VOICE: CPT | Mod: GN

## 2025-02-10 ENCOUNTER — APPOINTMENT (OUTPATIENT)
Dept: OTOLARYNGOLOGY | Facility: CLINIC | Age: 70
End: 2025-02-10

## 2025-02-24 ENCOUNTER — APPOINTMENT (OUTPATIENT)
Dept: OTOLARYNGOLOGY | Facility: CLINIC | Age: 70
End: 2025-02-24

## 2025-02-24 PROCEDURE — 92507 TX SP LANG VOICE COMM INDIV: CPT | Mod: GN

## 2025-03-10 ENCOUNTER — APPOINTMENT (OUTPATIENT)
Dept: OTOLARYNGOLOGY | Facility: CLINIC | Age: 70
End: 2025-03-10

## 2025-06-03 ENCOUNTER — APPOINTMENT (OUTPATIENT)
Dept: THORACIC SURGERY | Facility: CLINIC | Age: 70
End: 2025-06-03
Payer: MEDICARE

## 2025-06-03 VITALS
RESPIRATION RATE: 16 BRPM | OXYGEN SATURATION: 96 % | SYSTOLIC BLOOD PRESSURE: 136 MMHG | WEIGHT: 138 LBS | DIASTOLIC BLOOD PRESSURE: 82 MMHG | BODY MASS INDEX: 24.45 KG/M2 | HEART RATE: 94 BPM | HEIGHT: 63 IN

## 2025-06-03 DIAGNOSIS — C34.92 MALIGNANT NEOPLASM OF UNSPECIFIED PART OF LEFT BRONCHUS OR LUNG: ICD-10-CM

## 2025-06-03 PROCEDURE — 99213 OFFICE O/P EST LOW 20 MIN: CPT

## 2025-07-14 NOTE — ASU PATIENT PROFILE, ADULT - NSICDXPASTMEDICALHX_GEN_ALL_CORE_FT
Form received [facility] paperwork, placed in provider's wall bin.   After form is completed patient would like form to be completed by provider. Please call him when done and ready for  [phone]  Thank you   PAST MEDICAL HISTORY:  Emphysema lung     Former smoker     Pulmonary nodule     Renal calculi     Shingles

## (undated) DEVICE — PACK MAJOR ABDOMINAL W ENDO DRAPE

## (undated) DEVICE — STAPLER ETHICON ECHELON FLEX 45MM X 340MM

## (undated) DEVICE — CONNECTOR REDUCING STRAIGHT 3/8X0.25"

## (undated) DEVICE — STAPLER ECHELON FLEX POWERED PLUS 340MM

## (undated) DEVICE — FOLEY TRAY 16FR 5CC LF UMETER CLOSED

## (undated) DEVICE — SUT VICRYL 2-0 27" UR-6

## (undated) DEVICE — WARMING BLANKET LOWER ADULT

## (undated) DEVICE — DRSG TEGADERM 4X4.75"

## (undated) DEVICE — SOL ANTI FOG

## (undated) DEVICE — SYR LUER LOK 10CC

## (undated) DEVICE — POSITIONER STRAP ARMBOARD VELCRO TS-30

## (undated) DEVICE — SHEARS HARMONIC 1100 5MM X 36CM CURVED TIP

## (undated) DEVICE — DRSG CURITY GAUZE SPONGE 4 X 4" 12-PLY

## (undated) DEVICE — TRAP SPECIMEN SPUTUM 40CC

## (undated) DEVICE — NDL HYPO REGULAR BEVEL 25G X 1.5" (BLUE)

## (undated) DEVICE — ELCTR GROUNDING PAD ADULT COVIDIEN

## (undated) DEVICE — CHEST DRAIN PLEUR-EVAC DRY/WET ADULT-PEDS SINGLE (QUICK)

## (undated) DEVICE — DRSG TELFA 3 X 8

## (undated) DEVICE — ELCTR BOVIE TIP BLADE INSULATED 6.5" EDGE

## (undated) DEVICE — SOL IRR POUR NS 0.9% 500ML

## (undated) DEVICE — SPECIMEN CONTAINER 100ML

## (undated) DEVICE — SYR SLIP 10CC

## (undated) DEVICE — DISSECTOR ENDOSCOPIC KITTNER SINGLE TIP

## (undated) DEVICE — DRAPE MAYO STAND 23"

## (undated) DEVICE — DRSG STERISTRIPS 0.5 X 4"

## (undated) DEVICE — SUT SILK 0 18" TIES

## (undated) DEVICE — SUT SILK 2-0 30" TIES

## (undated) DEVICE — ENDOCATCH GENERAL 15MM (PURPLE)

## (undated) DEVICE — DURABLE MEDICAL EQUIPMENT: Type: DURABLE MEDICAL EQUIPMENT

## (undated) DEVICE — ELCTR EXTENSION STRAIGHT

## (undated) DEVICE — VENODYNE/SCD SLEEVE CALF MEDIUM

## (undated) DEVICE — TUBING SUCTION NONCONDUCTIVE 6MM X 12FT

## (undated) DEVICE — DRAPE 3/4 SHEET 52X76"

## (undated) DEVICE — STAPLER ECHELON FLEX POWERED ADV PLCMT TIP

## (undated) DEVICE — VALVE BIOPSY BRONCHOVIDEOSCOPE

## (undated) DEVICE — CHEST DRAIN OASIS DRY SUCTION WATER SEAL

## (undated) DEVICE — VISITEC 4X4

## (undated) DEVICE — SUT VICRYL 0 27" CT-1 UNDYED

## (undated) DEVICE — SUT MONOCRYL 4-0 27" PS-2 UNDYED

## (undated) DEVICE — DRSG BENZOIN 0.6CC

## (undated) DEVICE — TAPE SILK 3"

## (undated) DEVICE — VALVE SUCTION EVIS 160/200/240

## (undated) DEVICE — SUT VICRYL 3-0 27" SH UNDYED

## (undated) DEVICE — PROTECTOR HEEL / ELBOW FLUFFY

## (undated) DEVICE — DRAPE MAGNETIC INSTRUMENT MEDIUM

## (undated) DEVICE — PREP CHLORAPREP HI-LITE ORANGE 26ML

## (undated) DEVICE — ELCTR BOVIE TIP BLADE INSULATED 2.75" EDGE

## (undated) DEVICE — APPLICATOR FOR PROGEL EXTENDED SPRAY TIP 29CM

## (undated) DEVICE — DRAPE IOBAN 33" X 23"

## (undated) DEVICE — SUT PROLENE 0 30" CT-1

## (undated) DEVICE — APPLICATOR VISTASEAL LAP DUAL 35CM RIGID